# Patient Record
Sex: FEMALE | Race: WHITE | NOT HISPANIC OR LATINO | Employment: OTHER | URBAN - METROPOLITAN AREA
[De-identification: names, ages, dates, MRNs, and addresses within clinical notes are randomized per-mention and may not be internally consistent; named-entity substitution may affect disease eponyms.]

---

## 2017-03-13 ENCOUNTER — CONVERSION ENCOUNTER (OUTPATIENT)
Dept: MAMMOGRAPHY | Facility: HOSPITAL | Age: 67
End: 2017-03-13

## 2018-02-12 ENCOUNTER — OFFICE VISIT CONVERTED (OUTPATIENT)
Dept: INTERNAL MEDICINE | Facility: CLINIC | Age: 68
End: 2018-02-12
Attending: INTERNAL MEDICINE

## 2018-03-14 ENCOUNTER — CONVERSION ENCOUNTER (OUTPATIENT)
Dept: INTERNAL MEDICINE | Facility: CLINIC | Age: 68
End: 2018-03-14

## 2018-03-14 ENCOUNTER — OFFICE VISIT CONVERTED (OUTPATIENT)
Dept: INTERNAL MEDICINE | Facility: CLINIC | Age: 68
End: 2018-03-14
Attending: INTERNAL MEDICINE

## 2018-03-20 ENCOUNTER — CONVERSION ENCOUNTER (OUTPATIENT)
Dept: MAMMOGRAPHY | Facility: HOSPITAL | Age: 68
End: 2018-03-20

## 2018-04-05 ENCOUNTER — CONVERSION ENCOUNTER (OUTPATIENT)
Dept: GASTROENTEROLOGY | Facility: CLINIC | Age: 68
End: 2018-04-05

## 2018-06-06 ENCOUNTER — CONVERSION ENCOUNTER (OUTPATIENT)
Dept: NEUROLOGY | Facility: CLINIC | Age: 68
End: 2018-06-06

## 2018-06-06 ENCOUNTER — OFFICE VISIT CONVERTED (OUTPATIENT)
Dept: NEUROLOGY | Facility: CLINIC | Age: 68
End: 2018-06-06
Attending: PSYCHIATRY & NEUROLOGY

## 2018-06-18 ENCOUNTER — CONVERSION ENCOUNTER (OUTPATIENT)
Dept: INTERNAL MEDICINE | Facility: CLINIC | Age: 68
End: 2018-06-18

## 2018-06-18 ENCOUNTER — OFFICE VISIT CONVERTED (OUTPATIENT)
Dept: INTERNAL MEDICINE | Facility: CLINIC | Age: 68
End: 2018-06-18
Attending: INTERNAL MEDICINE

## 2018-07-13 ENCOUNTER — OFFICE VISIT CONVERTED (OUTPATIENT)
Dept: INTERNAL MEDICINE | Facility: CLINIC | Age: 68
End: 2018-07-13
Attending: INTERNAL MEDICINE

## 2018-07-18 ENCOUNTER — CONVERSION ENCOUNTER (OUTPATIENT)
Dept: INTERNAL MEDICINE | Facility: CLINIC | Age: 68
End: 2018-07-18

## 2018-08-09 ENCOUNTER — OFFICE VISIT CONVERTED (OUTPATIENT)
Dept: INTERNAL MEDICINE | Facility: CLINIC | Age: 68
End: 2018-08-09
Attending: INTERNAL MEDICINE

## 2018-08-09 ENCOUNTER — CONVERSION ENCOUNTER (OUTPATIENT)
Dept: INTERNAL MEDICINE | Facility: CLINIC | Age: 68
End: 2018-08-09

## 2018-08-13 ENCOUNTER — CONVERSION ENCOUNTER (OUTPATIENT)
Dept: SURGERY | Facility: CLINIC | Age: 68
End: 2018-08-13

## 2018-08-13 ENCOUNTER — OFFICE VISIT CONVERTED (OUTPATIENT)
Dept: UROLOGY | Facility: CLINIC | Age: 68
End: 2018-08-13
Attending: UROLOGY

## 2018-09-04 ENCOUNTER — OFFICE VISIT CONVERTED (OUTPATIENT)
Dept: INTERNAL MEDICINE | Facility: CLINIC | Age: 68
End: 2018-09-04
Attending: PHYSICIAN ASSISTANT

## 2018-09-12 ENCOUNTER — CONVERSION ENCOUNTER (OUTPATIENT)
Dept: NEUROLOGY | Facility: CLINIC | Age: 68
End: 2018-09-12

## 2018-09-12 ENCOUNTER — OFFICE VISIT CONVERTED (OUTPATIENT)
Dept: INTERNAL MEDICINE | Facility: CLINIC | Age: 68
End: 2018-09-12
Attending: INTERNAL MEDICINE

## 2018-09-12 ENCOUNTER — OFFICE VISIT CONVERTED (OUTPATIENT)
Dept: NEUROLOGY | Facility: CLINIC | Age: 68
End: 2018-09-12
Attending: PSYCHIATRY & NEUROLOGY

## 2018-10-08 ENCOUNTER — OFFICE VISIT CONVERTED (OUTPATIENT)
Dept: GASTROENTEROLOGY | Facility: CLINIC | Age: 68
End: 2018-10-08
Attending: NURSE PRACTITIONER

## 2018-10-08 ENCOUNTER — CONVERSION ENCOUNTER (OUTPATIENT)
Dept: GASTROENTEROLOGY | Facility: CLINIC | Age: 68
End: 2018-10-08

## 2018-10-24 ENCOUNTER — OFFICE VISIT CONVERTED (OUTPATIENT)
Dept: INTERNAL MEDICINE | Facility: CLINIC | Age: 68
End: 2018-10-24
Attending: INTERNAL MEDICINE

## 2018-12-14 ENCOUNTER — OFFICE VISIT CONVERTED (OUTPATIENT)
Dept: INTERNAL MEDICINE | Facility: CLINIC | Age: 68
End: 2018-12-14
Attending: NURSE PRACTITIONER

## 2019-02-07 ENCOUNTER — OFFICE VISIT CONVERTED (OUTPATIENT)
Dept: NEUROLOGY | Facility: CLINIC | Age: 69
End: 2019-02-07
Attending: PHYSICIAN ASSISTANT

## 2019-03-04 ENCOUNTER — HOSPITAL ENCOUNTER (OUTPATIENT)
Dept: OTHER | Facility: HOSPITAL | Age: 69
Discharge: HOME | End: 2019-03-04
Attending: INTERNAL MEDICINE

## 2019-03-06 LAB — BACTERIA UR CULT: ABNORMAL

## 2019-03-15 ENCOUNTER — HOSPITAL ENCOUNTER (OUTPATIENT)
Dept: OTHER | Facility: HOSPITAL | Age: 69
Discharge: HOME | End: 2019-03-15
Attending: INTERNAL MEDICINE

## 2019-05-06 ENCOUNTER — OFFICE VISIT CONVERTED (OUTPATIENT)
Dept: INTERNAL MEDICINE | Facility: CLINIC | Age: 69
End: 2019-05-06
Attending: PHYSICIAN ASSISTANT

## 2019-05-06 ENCOUNTER — HOSPITAL ENCOUNTER (OUTPATIENT)
Dept: OTHER | Facility: HOSPITAL | Age: 69
Discharge: HOME | End: 2019-05-06
Attending: PHYSICIAN ASSISTANT

## 2019-06-11 ENCOUNTER — OFFICE VISIT CONVERTED (OUTPATIENT)
Dept: GASTROENTEROLOGY | Facility: CLINIC | Age: 69
End: 2019-06-11
Attending: NURSE PRACTITIONER

## 2019-07-01 ENCOUNTER — HOSPITAL ENCOUNTER (OUTPATIENT)
Dept: OTHER | Facility: HOSPITAL | Age: 69
Discharge: HOME | End: 2019-07-01
Attending: PHYSICIAN ASSISTANT

## 2019-07-01 ENCOUNTER — OFFICE VISIT CONVERTED (OUTPATIENT)
Dept: INTERNAL MEDICINE | Facility: CLINIC | Age: 69
End: 2019-07-01
Attending: PHYSICIAN ASSISTANT

## 2019-07-01 ENCOUNTER — CONVERSION ENCOUNTER (OUTPATIENT)
Dept: INTERNAL MEDICINE | Facility: CLINIC | Age: 69
End: 2019-07-01

## 2019-07-01 LAB
ALBUMIN SERPL-MCNC: 4.4 G/DL (ref 3.5–5)
ALBUMIN/GLOB SERPL: 1.4 {RATIO} (ref 1.4–2.6)
ALP SERPL-CCNC: 96 U/L (ref 43–160)
ALT SERPL-CCNC: 58 U/L (ref 10–40)
ANION GAP SERPL CALC-SCNC: 18 MMOL/L (ref 8–19)
AST SERPL-CCNC: 51 U/L (ref 15–50)
BASOPHILS # BLD AUTO: 0.02 10*3/UL (ref 0–0.2)
BASOPHILS NFR BLD AUTO: 0.4 % (ref 0–3)
BILIRUB SERPL-MCNC: 0.71 MG/DL (ref 0.2–1.3)
BUN SERPL-MCNC: 11 MG/DL (ref 5–25)
BUN/CREAT SERPL: 14 {RATIO} (ref 6–20)
CALCIUM SERPL-MCNC: 9.8 MG/DL (ref 8.7–10.4)
CHLORIDE SERPL-SCNC: 100 MMOL/L (ref 99–111)
CHOLEST SERPL-MCNC: 231 MG/DL (ref 107–200)
CHOLEST/HDLC SERPL: 4.9 {RATIO} (ref 3–6)
CONV ABS IMM GRAN: 0.02 10*3/UL (ref 0–0.2)
CONV CO2: 22 MMOL/L (ref 22–32)
CONV IMMATURE GRAN: 0.4 % (ref 0–1.8)
CONV TOTAL PROTEIN: 7.6 G/DL (ref 6.3–8.2)
CREAT UR-MCNC: 0.76 MG/DL (ref 0.5–0.9)
DEPRECATED RDW RBC AUTO: 44.4 FL (ref 36.4–46.3)
EOSINOPHIL # BLD AUTO: 0.03 10*3/UL (ref 0–0.7)
EOSINOPHIL # BLD AUTO: 0.7 % (ref 0–7)
ERYTHROCYTE [DISTWIDTH] IN BLOOD BY AUTOMATED COUNT: 13.7 % (ref 11.7–14.4)
EST. AVERAGE GLUCOSE BLD GHB EST-MCNC: 255 MG/DL
GFR SERPLBLD BASED ON 1.73 SQ M-ARVRAT: >60 ML/MIN/{1.73_M2}
GLOBULIN UR ELPH-MCNC: 3.2 G/DL (ref 2–3.5)
GLUCOSE SERPL-MCNC: 264 MG/DL (ref 65–99)
HBA1C MFR BLD: 10.5 % (ref 3.5–5.7)
HBA1C MFR BLD: 12.8 G/DL (ref 12–16)
HCT VFR BLD AUTO: 39 % (ref 37–47)
HDLC SERPL-MCNC: 47 MG/DL (ref 40–60)
LDLC SERPL CALC-MCNC: 157 MG/DL (ref 70–100)
LYMPHOCYTES # BLD AUTO: 1.47 10*3/UL (ref 1–5)
MCH RBC QN AUTO: 29.8 PG (ref 27–31)
MCHC RBC AUTO-ENTMCNC: 32.8 G/DL (ref 33–37)
MCV RBC AUTO: 90.7 FL (ref 81–99)
MONOCYTES # BLD AUTO: 0.31 10*3/UL (ref 0.2–1.2)
MONOCYTES NFR BLD AUTO: 6.8 % (ref 3–10)
NEUTROPHILS # BLD AUTO: 2.73 10*3/UL (ref 2–8)
NEUTROPHILS NFR BLD AUTO: 59.6 % (ref 30–85)
NRBC CBCN: 0 % (ref 0–0.7)
OSMOLALITY SERPL CALC.SUM OF ELEC: 291 MOSM/KG (ref 273–304)
PLATELET # BLD AUTO: 308 10*3/UL (ref 130–400)
PMV BLD AUTO: 9.5 FL (ref 9.4–12.3)
POTASSIUM SERPL-SCNC: 4.2 MMOL/L (ref 3.5–5.3)
RBC # BLD AUTO: 4.3 10*6/UL (ref 4.2–5.4)
SODIUM SERPL-SCNC: 136 MMOL/L (ref 135–147)
T4 FREE SERPL-MCNC: 1.2 NG/DL (ref 0.9–1.8)
TRIGL SERPL-MCNC: 137 MG/DL (ref 40–150)
TSH SERPL-ACNC: 0.71 M[IU]/L (ref 0.27–4.2)
VARIANT LYMPHS NFR BLD MANUAL: 32.1 % (ref 20–45)
VLDLC SERPL-MCNC: 27 MG/DL (ref 5–37)
WBC # BLD AUTO: 4.58 10*3/UL (ref 4.8–10.8)

## 2019-07-31 ENCOUNTER — OFFICE VISIT CONVERTED (OUTPATIENT)
Dept: INTERNAL MEDICINE | Facility: CLINIC | Age: 69
End: 2019-07-31
Attending: PHYSICIAN ASSISTANT

## 2019-07-31 ENCOUNTER — CONVERSION ENCOUNTER (OUTPATIENT)
Dept: INTERNAL MEDICINE | Facility: CLINIC | Age: 69
End: 2019-07-31

## 2019-09-20 ENCOUNTER — HOSPITAL ENCOUNTER (OUTPATIENT)
Dept: OTHER | Facility: HOSPITAL | Age: 69
Discharge: HOME | End: 2019-09-20
Attending: INTERNAL MEDICINE

## 2019-09-20 ENCOUNTER — OFFICE VISIT CONVERTED (OUTPATIENT)
Dept: INTERNAL MEDICINE | Facility: CLINIC | Age: 69
End: 2019-09-20
Attending: INTERNAL MEDICINE

## 2019-09-20 LAB
ALBUMIN SERPL-MCNC: 4.5 G/DL (ref 3.5–5)
ALBUMIN/GLOB SERPL: 1.3 {RATIO} (ref 1.4–2.6)
ALP SERPL-CCNC: 100 U/L (ref 43–160)
ALT SERPL-CCNC: 39 U/L (ref 10–40)
ANION GAP SERPL CALC-SCNC: 20 MMOL/L (ref 8–19)
AST SERPL-CCNC: 33 U/L (ref 15–50)
BASOPHILS # BLD AUTO: 0.03 10*3/UL (ref 0–0.2)
BASOPHILS NFR BLD AUTO: 0.6 % (ref 0–3)
BILIRUB SERPL-MCNC: 0.34 MG/DL (ref 0.2–1.3)
BUN SERPL-MCNC: 15 MG/DL (ref 5–25)
BUN/CREAT SERPL: 16 {RATIO} (ref 6–20)
CALCIUM SERPL-MCNC: 9.6 MG/DL (ref 8.7–10.4)
CHLORIDE SERPL-SCNC: 102 MMOL/L (ref 99–111)
CHOLEST SERPL-MCNC: 190 MG/DL (ref 107–200)
CHOLEST/HDLC SERPL: 4.6 {RATIO} (ref 3–6)
CONV ABS IMM GRAN: 0.03 10*3/UL (ref 0–0.2)
CONV CO2: 23 MMOL/L (ref 22–32)
CONV IMMATURE GRAN: 0.6 % (ref 0–1.8)
CONV TOTAL PROTEIN: 7.9 G/DL (ref 6.3–8.2)
CREAT UR-MCNC: 0.96 MG/DL (ref 0.5–0.9)
DEPRECATED RDW RBC AUTO: 44 FL (ref 36.4–46.3)
EOSINOPHIL # BLD AUTO: 0.04 10*3/UL (ref 0–0.7)
EOSINOPHIL # BLD AUTO: 0.8 % (ref 0–7)
ERYTHROCYTE [DISTWIDTH] IN BLOOD BY AUTOMATED COUNT: 13.4 % (ref 11.7–14.4)
EST. AVERAGE GLUCOSE BLD GHB EST-MCNC: 232 MG/DL
GFR SERPLBLD BASED ON 1.73 SQ M-ARVRAT: >60 ML/MIN/{1.73_M2}
GLOBULIN UR ELPH-MCNC: 3.4 G/DL (ref 2–3.5)
GLUCOSE SERPL-MCNC: 256 MG/DL (ref 65–99)
HBA1C MFR BLD: 9.7 % (ref 3.5–5.7)
HCT VFR BLD AUTO: 39.1 % (ref 37–47)
HDLC SERPL-MCNC: 41 MG/DL (ref 40–60)
HGB BLD-MCNC: 12.7 G/DL (ref 12–16)
LDLC SERPL CALC-MCNC: 111 MG/DL (ref 70–100)
LYMPHOCYTES # BLD AUTO: 1.52 10*3/UL (ref 1–5)
LYMPHOCYTES NFR BLD AUTO: 29.9 % (ref 20–45)
MCH RBC QN AUTO: 29.5 PG (ref 27–31)
MCHC RBC AUTO-ENTMCNC: 32.5 G/DL (ref 33–37)
MCV RBC AUTO: 90.9 FL (ref 81–99)
MONOCYTES # BLD AUTO: 0.31 10*3/UL (ref 0.2–1.2)
MONOCYTES NFR BLD AUTO: 6.1 % (ref 3–10)
NEUTROPHILS # BLD AUTO: 3.15 10*3/UL (ref 2–8)
NEUTROPHILS NFR BLD AUTO: 62 % (ref 30–85)
NRBC CBCN: 0 % (ref 0–0.7)
OSMOLALITY SERPL CALC.SUM OF ELEC: 302 MOSM/KG (ref 273–304)
PLATELET # BLD AUTO: 309 10*3/UL (ref 130–400)
PMV BLD AUTO: 9.5 FL (ref 9.4–12.3)
POTASSIUM SERPL-SCNC: 4.2 MMOL/L (ref 3.5–5.3)
RBC # BLD AUTO: 4.3 10*6/UL (ref 4.2–5.4)
SODIUM SERPL-SCNC: 141 MMOL/L (ref 135–147)
T4 FREE SERPL-MCNC: 1.1 NG/DL (ref 0.9–1.8)
TRIGL SERPL-MCNC: 189 MG/DL (ref 40–150)
TSH SERPL-ACNC: 0.83 M[IU]/L (ref 0.27–4.2)
VLDLC SERPL-MCNC: 38 MG/DL (ref 5–37)
WBC # BLD AUTO: 5.08 10*3/UL (ref 4.8–10.8)

## 2019-09-21 LAB — 25(OH)D3 SERPL-MCNC: 61 NG/ML (ref 30–100)

## 2019-10-03 ENCOUNTER — OFFICE VISIT CONVERTED (OUTPATIENT)
Dept: NEUROLOGY | Facility: CLINIC | Age: 69
End: 2019-10-03
Attending: PSYCHIATRY & NEUROLOGY

## 2019-10-03 ENCOUNTER — HOSPITAL ENCOUNTER (OUTPATIENT)
Dept: ULTRASOUND IMAGING | Facility: HOSPITAL | Age: 69
Discharge: HOME | End: 2019-10-03
Attending: INTERNAL MEDICINE

## 2019-10-31 ENCOUNTER — HOSPITAL ENCOUNTER (OUTPATIENT)
Dept: SURGERY | Facility: CLINIC | Age: 69
Discharge: HOME | End: 2019-10-31
Attending: INTERNAL MEDICINE

## 2019-11-13 ENCOUNTER — OFFICE VISIT CONVERTED (OUTPATIENT)
Dept: INTERNAL MEDICINE | Facility: CLINIC | Age: 69
End: 2019-11-13
Attending: PHYSICIAN ASSISTANT

## 2019-11-13 ENCOUNTER — CONVERSION ENCOUNTER (OUTPATIENT)
Dept: INTERNAL MEDICINE | Facility: CLINIC | Age: 69
End: 2019-11-13

## 2019-11-19 ENCOUNTER — HOSPITAL ENCOUNTER (OUTPATIENT)
Dept: OTHER | Facility: HOSPITAL | Age: 69
Discharge: HOME | End: 2019-11-19
Attending: NURSE PRACTITIONER

## 2019-11-19 ENCOUNTER — CONVERSION ENCOUNTER (OUTPATIENT)
Dept: INTERNAL MEDICINE | Facility: CLINIC | Age: 69
End: 2019-11-19

## 2019-11-19 ENCOUNTER — OFFICE VISIT CONVERTED (OUTPATIENT)
Dept: INTERNAL MEDICINE | Facility: CLINIC | Age: 69
End: 2019-11-19
Attending: NURSE PRACTITIONER

## 2019-12-06 ENCOUNTER — HOSPITAL ENCOUNTER (OUTPATIENT)
Dept: URGENT CARE | Facility: CLINIC | Age: 69
Discharge: HOME | End: 2019-12-06
Attending: NURSE PRACTITIONER

## 2020-01-06 ENCOUNTER — HOSPITAL ENCOUNTER (OUTPATIENT)
Dept: MAMMOGRAPHY | Facility: HOSPITAL | Age: 70
Discharge: HOME | End: 2020-01-06
Attending: INTERNAL MEDICINE

## 2020-02-05 ENCOUNTER — CONVERSION ENCOUNTER (OUTPATIENT)
Dept: SURGERY | Facility: CLINIC | Age: 70
End: 2020-02-05

## 2020-02-05 ENCOUNTER — OFFICE VISIT CONVERTED (OUTPATIENT)
Dept: UROLOGY | Facility: CLINIC | Age: 70
End: 2020-02-05
Attending: UROLOGY

## 2020-02-14 ENCOUNTER — OFFICE VISIT CONVERTED (OUTPATIENT)
Dept: INTERNAL MEDICINE | Facility: CLINIC | Age: 70
End: 2020-02-14
Attending: INTERNAL MEDICINE

## 2020-02-14 ENCOUNTER — HOSPITAL ENCOUNTER (OUTPATIENT)
Dept: OTHER | Facility: HOSPITAL | Age: 70
Discharge: HOME | End: 2020-02-14
Attending: INTERNAL MEDICINE

## 2020-02-14 ENCOUNTER — CONVERSION ENCOUNTER (OUTPATIENT)
Dept: INTERNAL MEDICINE | Facility: CLINIC | Age: 70
End: 2020-02-14

## 2020-02-14 LAB
ALBUMIN SERPL-MCNC: 4.1 G/DL (ref 3.5–5)
ALBUMIN/GLOB SERPL: 1.2 {RATIO} (ref 1.4–2.6)
ALP SERPL-CCNC: 141 U/L (ref 43–160)
ALT SERPL-CCNC: 59 U/L (ref 10–40)
ANION GAP SERPL CALC-SCNC: 17 MMOL/L (ref 8–19)
AST SERPL-CCNC: 43 U/L (ref 15–50)
BASOPHILS # BLD AUTO: 0.03 10*3/UL (ref 0–0.2)
BASOPHILS NFR BLD AUTO: 0.7 % (ref 0–3)
BILIRUB SERPL-MCNC: 0.34 MG/DL (ref 0.2–1.3)
BUN SERPL-MCNC: 10 MG/DL (ref 5–25)
BUN/CREAT SERPL: 14 {RATIO} (ref 6–20)
CALCIUM SERPL-MCNC: 9.5 MG/DL (ref 8.7–10.4)
CHLORIDE SERPL-SCNC: 99 MMOL/L (ref 99–111)
CHOLEST SERPL-MCNC: 211 MG/DL (ref 107–200)
CHOLEST/HDLC SERPL: 4.9 {RATIO} (ref 3–6)
CONV ABS IMM GRAN: 0.03 10*3/UL (ref 0–0.2)
CONV CO2: 24 MMOL/L (ref 22–32)
CONV IMMATURE GRAN: 0.7 % (ref 0–1.8)
CONV TOTAL PROTEIN: 7.6 G/DL (ref 6.3–8.2)
CREAT UR-MCNC: 0.73 MG/DL (ref 0.5–0.9)
DEPRECATED RDW RBC AUTO: 43.3 FL (ref 36.4–46.3)
EOSINOPHIL # BLD AUTO: 0.04 10*3/UL (ref 0–0.7)
EOSINOPHIL # BLD AUTO: 0.9 % (ref 0–7)
ERYTHROCYTE [DISTWIDTH] IN BLOOD BY AUTOMATED COUNT: 13.2 % (ref 11.7–14.4)
EST. AVERAGE GLUCOSE BLD GHB EST-MCNC: 260 MG/DL
GFR SERPLBLD BASED ON 1.73 SQ M-ARVRAT: >60 ML/MIN/{1.73_M2}
GLOBULIN UR ELPH-MCNC: 3.5 G/DL (ref 2–3.5)
GLUCOSE SERPL-MCNC: 364 MG/DL (ref 65–99)
HBA1C MFR BLD: 10.7 % (ref 3.5–5.7)
HCT VFR BLD AUTO: 40.4 % (ref 37–47)
HDLC SERPL-MCNC: 43 MG/DL (ref 40–60)
HGB BLD-MCNC: 13.4 G/DL (ref 12–16)
LDLC SERPL CALC-MCNC: 99 MG/DL (ref 70–100)
LYMPHOCYTES # BLD AUTO: 1.34 10*3/UL (ref 1–5)
LYMPHOCYTES NFR BLD AUTO: 30 % (ref 20–45)
MCH RBC QN AUTO: 29.8 PG (ref 27–31)
MCHC RBC AUTO-ENTMCNC: 33.2 G/DL (ref 33–37)
MCV RBC AUTO: 89.8 FL (ref 81–99)
MONOCYTES # BLD AUTO: 0.27 10*3/UL (ref 0.2–1.2)
MONOCYTES NFR BLD AUTO: 6 % (ref 3–10)
NEUTROPHILS # BLD AUTO: 2.76 10*3/UL (ref 2–8)
NEUTROPHILS NFR BLD AUTO: 61.7 % (ref 30–85)
NRBC CBCN: 0 % (ref 0–0.7)
OSMOLALITY SERPL CALC.SUM OF ELEC: 296 MOSM/KG (ref 273–304)
PLATELET # BLD AUTO: 305 10*3/UL (ref 130–400)
PMV BLD AUTO: 10 FL (ref 9.4–12.3)
POTASSIUM SERPL-SCNC: 4.2 MMOL/L (ref 3.5–5.3)
RBC # BLD AUTO: 4.5 10*6/UL (ref 4.2–5.4)
SODIUM SERPL-SCNC: 136 MMOL/L (ref 135–147)
T4 FREE SERPL-MCNC: 1 NG/DL (ref 0.9–1.8)
TRIGL SERPL-MCNC: 343 MG/DL (ref 40–150)
TSH SERPL-ACNC: 1.31 M[IU]/L (ref 0.27–4.2)
VLDLC SERPL-MCNC: 69 MG/DL (ref 5–37)
WBC # BLD AUTO: 4.47 10*3/UL (ref 4.8–10.8)

## 2020-02-20 ENCOUNTER — OFFICE VISIT CONVERTED (OUTPATIENT)
Dept: GASTROENTEROLOGY | Facility: CLINIC | Age: 70
End: 2020-02-20
Attending: NURSE PRACTITIONER

## 2020-04-23 ENCOUNTER — HOSPITAL ENCOUNTER (OUTPATIENT)
Dept: LAB | Facility: HOSPITAL | Age: 70
Discharge: HOME | End: 2020-04-23
Attending: NURSE PRACTITIONER

## 2020-04-23 LAB
CONV AFP: 1.1 NG/ML (ref 0–8.7)
DSDNA AB SER-ACNC: NEGATIVE [IU]/ML
ENA AB SER IA-ACNC: NEGATIVE {RATIO}
FERRITIN SERPL-MCNC: 201 NG/ML (ref 10–200)
INR PPP: 0.92 (ref 2–3)
IRON SATN MFR SERPL: 15 % (ref 20–55)
IRON SERPL-MCNC: 58 UG/DL (ref 60–170)
PROTHROMBIN TIME: 10.1 S (ref 9.4–12)
TIBC SERPL-MCNC: 399 UG/DL (ref 245–450)
TRANSFERRIN SERPL-MCNC: 279 MG/DL (ref 250–380)

## 2020-04-25 LAB
CERULOPLASMIN SERPL-MCNC: 28.2 MG/DL (ref 19–39)
CONV HEPATITIS B SURFACE AG W CONFIRMATION RE: NEGATIVE
COPPER SERPL-MCNC: 124 UG/DL (ref 72–166)
DEPRECATED MITOCHONDRIA M2 IGG SER-ACNC: <20 UNITS (ref 0–20)
HAV IGM SERPL QL IA: NEGATIVE
HBV CORE IGM SERPL QL IA: NEGATIVE
HCV AB SER DONR QL: <0.1 S/CO RATIO (ref 0–0.9)
SMOOTH MUSCLE F-ACTIN AB IGG: 4 UNITS (ref 0–19)

## 2020-04-27 ENCOUNTER — TELEMEDICINE CONVERTED (OUTPATIENT)
Dept: GASTROENTEROLOGY | Facility: CLINIC | Age: 70
End: 2020-04-27
Attending: NURSE PRACTITIONER

## 2020-04-27 LAB
ALBUMIN SERPL-MCNC: 3.5 G/DL (ref 2.9–4.4)
ALBUMIN/GLOB SERPL: 1 {RATIO} (ref 0.7–1.7)
ALPHA2 GLOB SERPL ELPH-MCNC: 1 G/DL (ref 0.4–1)
BETA GLOBULIN: 1.2 G/DL (ref 0.7–1.3)
CONV ALPHA-1-GLOBULIN: 0.3 G/DL (ref 0–0.4)
CONV IMMUNOGLOBULIN G (IGG): 1047 MG/DL (ref 586–1602)
CONV IMMUNOGLOBULIN M (IGM): 74 MG/DL (ref 26–217)
CONV PE INTERPRETATION: NORMAL
CONV PE NOTE: NORMAL
CONV TOTAL PROTEIN: 7.1 G/DL (ref 6–8.5)
GAMMA GLOB SERPL ELPH-MCNC: 1.1 G/DL (ref 0.4–1.8)
GLOBULIN UR ELPH-MCNC: 3.6 G/DL (ref 2.2–3.9)
IGA SERPL-MCNC: 164 MG/DL (ref 87–352)
M-SPIKE: NORMAL G/DL
PROT PATTERN SERPL IFE-IMP: NORMAL

## 2020-04-28 LAB
A1AT SERPL-MCNC: 145 MG/DL (ref 101–187)
CONV NASH FIBROSURE: NORMAL
PHENOTYPE: NORMAL

## 2020-05-20 ENCOUNTER — TELEMEDICINE CONVERTED (OUTPATIENT)
Dept: NEUROLOGY | Facility: CLINIC | Age: 70
End: 2020-05-20
Attending: PSYCHIATRY & NEUROLOGY

## 2020-06-17 ENCOUNTER — OFFICE VISIT CONVERTED (OUTPATIENT)
Dept: INTERNAL MEDICINE | Facility: CLINIC | Age: 70
End: 2020-06-17
Attending: INTERNAL MEDICINE

## 2020-07-28 ENCOUNTER — OFFICE VISIT CONVERTED (OUTPATIENT)
Dept: INTERNAL MEDICINE | Facility: CLINIC | Age: 70
End: 2020-07-28
Attending: NURSE PRACTITIONER

## 2020-07-28 ENCOUNTER — HOSPITAL ENCOUNTER (OUTPATIENT)
Dept: OTHER | Facility: HOSPITAL | Age: 70
Discharge: HOME | End: 2020-07-28
Attending: NURSE PRACTITIONER

## 2020-10-06 ENCOUNTER — HOSPITAL ENCOUNTER (OUTPATIENT)
Dept: OTHER | Facility: HOSPITAL | Age: 70
Discharge: HOME | End: 2020-10-06
Attending: NURSE PRACTITIONER

## 2020-10-06 ENCOUNTER — CONVERSION ENCOUNTER (OUTPATIENT)
Dept: INTERNAL MEDICINE | Facility: CLINIC | Age: 70
End: 2020-10-06

## 2020-10-06 ENCOUNTER — OFFICE VISIT CONVERTED (OUTPATIENT)
Dept: INTERNAL MEDICINE | Facility: CLINIC | Age: 70
End: 2020-10-06
Attending: NURSE PRACTITIONER

## 2020-10-06 LAB
BASOPHILS # BLD AUTO: 0.02 10*3/UL (ref 0–0.2)
BASOPHILS NFR BLD AUTO: 0.4 % (ref 0–3)
CONV ABS IMM GRAN: 0.03 10*3/UL (ref 0–0.2)
CONV CREATININE URINE, RANDOM: 44.3 MG/DL (ref 10–300)
CONV IMMATURE GRAN: 0.6 % (ref 0–1.8)
CONV MICROALBUM.,U,RANDOM: <12 MG/L (ref 0–20)
DEPRECATED RDW RBC AUTO: 43.6 FL (ref 36.4–46.3)
EOSINOPHIL # BLD AUTO: 0.04 10*3/UL (ref 0–0.7)
EOSINOPHIL # BLD AUTO: 0.8 % (ref 0–7)
ERYTHROCYTE [DISTWIDTH] IN BLOOD BY AUTOMATED COUNT: 13.2 % (ref 11.7–14.4)
HCT VFR BLD AUTO: 40.5 % (ref 37–47)
HGB BLD-MCNC: 13.2 G/DL (ref 12–16)
LYMPHOCYTES # BLD AUTO: 1.57 10*3/UL (ref 1–5)
LYMPHOCYTES NFR BLD AUTO: 32.8 % (ref 20–45)
MCH RBC QN AUTO: 29.9 PG (ref 27–31)
MCHC RBC AUTO-ENTMCNC: 32.6 G/DL (ref 33–37)
MCV RBC AUTO: 91.6 FL (ref 81–99)
MICROALBUMIN/CREAT UR: 27.1 MG/G{CRE} (ref 0–35)
MONOCYTES # BLD AUTO: 0.25 10*3/UL (ref 0.2–1.2)
MONOCYTES NFR BLD AUTO: 5.2 % (ref 3–10)
NEUTROPHILS # BLD AUTO: 2.87 10*3/UL (ref 2–8)
NEUTROPHILS NFR BLD AUTO: 60.2 % (ref 30–85)
NRBC CBCN: 0 % (ref 0–0.7)
PLATELET # BLD AUTO: 325 10*3/UL (ref 130–400)
PMV BLD AUTO: 9.7 FL (ref 9.4–12.3)
RBC # BLD AUTO: 4.42 10*6/UL (ref 4.2–5.4)
WBC # BLD AUTO: 4.78 10*3/UL (ref 4.8–10.8)

## 2020-10-07 LAB
ALBUMIN SERPL-MCNC: 4.1 G/DL (ref 3.5–5)
ALBUMIN/GLOB SERPL: 1.3 {RATIO} (ref 1.4–2.6)
ALP SERPL-CCNC: 81 U/L (ref 43–160)
ALT SERPL-CCNC: 47 U/L (ref 10–40)
ANION GAP SERPL CALC-SCNC: 16 MMOL/L (ref 8–19)
AST SERPL-CCNC: 35 U/L (ref 15–50)
BILIRUB SERPL-MCNC: 0.36 MG/DL (ref 0.2–1.3)
BUN SERPL-MCNC: 9 MG/DL (ref 5–25)
BUN/CREAT SERPL: 11 {RATIO} (ref 6–20)
CALCIUM SERPL-MCNC: 9.4 MG/DL (ref 8.7–10.4)
CHLORIDE SERPL-SCNC: 106 MMOL/L (ref 99–111)
CHOLEST SERPL-MCNC: 214 MG/DL (ref 107–200)
CHOLEST/HDLC SERPL: 5.2 {RATIO} (ref 3–6)
CONV CO2: 23 MMOL/L (ref 22–32)
CONV TOTAL PROTEIN: 7.3 G/DL (ref 6.3–8.2)
CREAT UR-MCNC: 0.83 MG/DL (ref 0.5–0.9)
EST. AVERAGE GLUCOSE BLD GHB EST-MCNC: 183 MG/DL
GFR SERPLBLD BASED ON 1.73 SQ M-ARVRAT: >60 ML/MIN/{1.73_M2}
GLOBULIN UR ELPH-MCNC: 3.2 G/DL (ref 2–3.5)
GLUCOSE SERPL-MCNC: 196 MG/DL (ref 65–99)
HBA1C MFR BLD: 8 % (ref 3.5–5.7)
HDLC SERPL-MCNC: 41 MG/DL (ref 40–60)
LDLC SERPL CALC-MCNC: 143 MG/DL (ref 70–100)
OSMOLALITY SERPL CALC.SUM OF ELEC: 296 MOSM/KG (ref 273–304)
POTASSIUM SERPL-SCNC: 3.8 MMOL/L (ref 3.5–5.3)
SODIUM SERPL-SCNC: 141 MMOL/L (ref 135–147)
T4 FREE SERPL-MCNC: 0.9 NG/DL (ref 0.9–1.8)
TRIGL SERPL-MCNC: 150 MG/DL (ref 40–150)
TSH SERPL-ACNC: 0.73 M[IU]/L (ref 0.27–4.2)
VLDLC SERPL-MCNC: 30 MG/DL (ref 5–37)

## 2020-10-16 ENCOUNTER — OFFICE VISIT CONVERTED (OUTPATIENT)
Dept: INTERNAL MEDICINE | Facility: CLINIC | Age: 70
End: 2020-10-16
Attending: INTERNAL MEDICINE

## 2020-11-19 ENCOUNTER — OFFICE VISIT CONVERTED (OUTPATIENT)
Dept: INTERNAL MEDICINE | Facility: CLINIC | Age: 70
End: 2020-11-19
Attending: STUDENT IN AN ORGANIZED HEALTH CARE EDUCATION/TRAINING PROGRAM

## 2020-11-23 ENCOUNTER — OFFICE VISIT CONVERTED (OUTPATIENT)
Dept: GASTROENTEROLOGY | Facility: CLINIC | Age: 70
End: 2020-11-23
Attending: NURSE PRACTITIONER

## 2020-12-02 ENCOUNTER — HOSPITAL ENCOUNTER (OUTPATIENT)
Dept: OTHER | Facility: HOSPITAL | Age: 70
Discharge: HOME | End: 2020-12-02
Attending: NURSE PRACTITIONER

## 2020-12-02 LAB
C DIFF TOX B STL QL CT TISS CULT: NEGATIVE
CONV 027 TOXIN: NEGATIVE

## 2020-12-03 LAB
CONV NEUTRAL FATS: NORMAL
FAT STL QL: NORMAL

## 2020-12-04 LAB — BACTERIA SPEC AEROBE CULT: NORMAL

## 2020-12-10 ENCOUNTER — HOSPITAL ENCOUNTER (OUTPATIENT)
Dept: OTHER | Facility: HOSPITAL | Age: 70
Discharge: HOME | End: 2020-12-10
Attending: PHYSICIAN ASSISTANT

## 2020-12-17 ENCOUNTER — HOSPITAL ENCOUNTER (OUTPATIENT)
Dept: PREADMISSION TESTING | Facility: HOSPITAL | Age: 70
Discharge: HOME | End: 2020-12-17
Attending: INTERNAL MEDICINE

## 2020-12-18 LAB — SARS-COV-2 RNA SPEC QL NAA+PROBE: NOT DETECTED

## 2020-12-22 ENCOUNTER — HOSPITAL ENCOUNTER (OUTPATIENT)
Dept: GASTROENTEROLOGY | Facility: HOSPITAL | Age: 70
Setting detail: HOSPITAL OUTPATIENT SURGERY
Discharge: HOME | End: 2020-12-22
Attending: INTERNAL MEDICINE

## 2020-12-22 LAB — GLUCOSE BLD-MCNC: 133 MG/DL (ref 65–99)

## 2021-01-16 ENCOUNTER — HOSPITAL ENCOUNTER (OUTPATIENT)
Dept: VACCINE CLINIC | Facility: HOSPITAL | Age: 71
Discharge: HOME | End: 2021-01-16
Attending: INTERNAL MEDICINE

## 2021-02-09 ENCOUNTER — OFFICE VISIT CONVERTED (OUTPATIENT)
Dept: GASTROENTEROLOGY | Facility: CLINIC | Age: 71
End: 2021-02-09
Attending: NURSE PRACTITIONER

## 2021-02-09 ENCOUNTER — HOSPITAL ENCOUNTER (OUTPATIENT)
Dept: VACCINE CLINIC | Facility: HOSPITAL | Age: 71
Discharge: HOME | End: 2021-02-09
Attending: INTERNAL MEDICINE

## 2021-03-01 ENCOUNTER — OFFICE VISIT CONVERTED (OUTPATIENT)
Dept: INTERNAL MEDICINE | Facility: CLINIC | Age: 71
End: 2021-03-01
Attending: PHYSICIAN ASSISTANT

## 2021-05-03 ENCOUNTER — HOSPITAL ENCOUNTER (OUTPATIENT)
Dept: CT IMAGING | Facility: HOSPITAL | Age: 71
Discharge: HOME | End: 2021-05-03
Attending: OPHTHALMOLOGY

## 2021-05-03 LAB
CREAT BLD-MCNC: 0.8 MG/DL (ref 0.6–1.4)
GFR SERPLBLD BASED ON 1.73 SQ M-ARVRAT: >60 ML/MIN/{1.73_M2}

## 2021-05-12 NOTE — PROGRESS NOTES
Progress Note      Patient Name: Sowmya Hodges   Patient ID: [id number]   Sex: Female   YOB: 1950    Primary Care Provider: Romi Rubio MD   Referring Provider: Chloe VENTURA    Visit Date: April 27, 2020    Provider: LORENZO Powell   Location: [facility]   Location Address: [address] [place] [address]  [id number]   Location Phone: [phone]          Chief Complaint     PT states her stomach hurts her every know and then her stomach stays hard, she states its stays bloated, sometimes she east she gets sick to her stomach. no other issue at this time.       History Of Present Illness  Sowmya Hodges is a 69 year old /White female who presents to the office today.   Video Conferencing Visit  Sowmya Hodges is a 69 year old /White female who is presenting for evaluation via video conferencing. Verbal consent obtained before beginning visit.   The following staff were present during this visit: KI Dickerson; Bassam Macias MA      Due to the national emergency of COVID-19, all visits are being performed via telehealth where possible.    Patient initially presented in 2017 with abdominal pain and heartburn.  Gastric emptying scan December 2017 within normal limits.  EGD/colonoscopy 4/12/2017: Erosive gastritis--stomach biopsy with mild reactive gastropathy otherwise negative, duodenal biopsy negative; good prep and negative--in recall for 10 yrs        PMH significant for renal cell carcinoma 2014 status post partial right nephrectomy, follows w DR Butts.    Patient was last seen February 2020 complaining of abdominal bloating and discomfort, especially after a meal, was on Linzess 145 mcg/day had a few episodes of fecal urgency with incontinence from that so she was changed to Motegrity.  Low FODMAP diet was reviewed with patient.  Liver work-up was ordered for fatty liver that was noted on CT done by urology in February.   Patient also reported she had a cousin with cirrhosis and was told they had a genetic issue.  Patient does not drink alcohol.  Reported early satiety and nausea were improved with changing from Protonix to Nexium over-the-counter.    Pt states she never received the Motegrity. Pt still w abd pain, states upper abd pain and lower abd pain--usually after a meal, not daily. Taking Linzess 145 mcg/day and has BM daily or QOD, states not as much issue w urgency.       4/23/2020 DONATO negative, ferritin 201 (unremarkable) part of liver w/u still pending/or missing       Past Medical History  Anemia; Anxiety; Arthritis; Constipation; Depression; Diabetes; Diverticulitis; Epilepsy; GERD (gastroesophageal reflux disease); Heart Disease; HTN (hypertension); Hyperlipidemia; Limb Swelling; Lower abdominal pain; TAYLOR (obstructive sleep apnea); Renal cell adenocarcinoma, right; Right Distal Fibula Fracture; Screening for breast cancer; Seasonal allergies; Shortness of Breath         Past Surgical History  Cholecystectomy; Colonoscopy; EGD; Eye Implant; Gallbladder; heart surgery; Heart Valves; Joint Surgery; Knee replacement, right; Nephrectomy, Patial Right; Open Heart Surgery; Salivary Gland Surgery; Tonsilectomy; Tonsillectomy; Wrist         Medication List  Name Date Started Instructions   Accu-Chek Sabrina Plus test strp miscellaneous strip 09/10/2018 use as directed tid prn DX E11.9   Blood Glucose Test miscellaneous strip 07/01/2019 use as directed test tid prn DX E11.9   blood-glucose meter miscellaneous kit 07/01/2019 use as directed test tid prn DX E11.9   buspirone 15 mg oral tablet 04/06/2020 TAKE 1 TABLET BY MOUTH EVERY DAY   Farxiga 10 mg oral tablet 04/17/2020 TAKE 1 TABLET(10 MG) BY MOUTH EVERY DAY IN THE MORNING   Flonase Allergy Relief 50 mcg/actuation nasal spray,suspension 11/15/2019 spray 1 - 2 sprays (50 - 100 mcg) in each nostril by intranasal route once daily as needed   gemfibrozil 600 mg oral tablet  02/03/2020 TAKE ONE TABLET BY MOUTH TWICE A DAY, 30 MINS BEFORE MORNING AND EVENING MEALS   Januvia 100 mg oral tablet 03/23/2020 TAKE 1 TABLET(100 MG) BY MOUTH EVERY DAY   Keppra 750 mg oral tablet 04/17/2020 take 1 tablet (750 mg) by oral route 2 times per day for 90 days   Klonopin 1 mg oral tablet 03/11/2020 take 1 tablet by oral route 2 times a day as needed   Lancets,Ultra Thin 26 gauge miscellaneous misc 10/24/2018 use as directed test tid prn DX E11.9   levetiracetam 750 mg oral tablet 10/23/2019 TAKE 1 TABLET BY MOUTH TWICE DAILY   Lexapro 20 mg oral tablet 04/17/2020 take 1 tablet (20 mg) by oral route once daily for 90 days   Linzess oral  --    lisinopril 2.5 mg oral tablet 04/17/2020 TAKE 1 TABLET BY MOUTH ONCE DAILY   Motegrity 2 mg oral tablet 02/20/2020 take 1 tablet (2 mg) by oral route once daily for 30 days   multivitamin oral tablet  take 1 tablet by oral route daily   Nexium 20 mg oral capsule,delayed release(DR/EC)  --    Ozempic 0.25 mg or 0.5 mg(2 mg/1.5 mL) subcutaneous pen injector 04/10/2020 inject 0.5 mg by subcutaneous route once weekly on the same day of each week   Percocet 7.5-325 mg oral tablet  take 1 tablet by oral route every 6 hours as needed   Vitamin D3 5,000 unit oral tablet  take 1 tablet by oral route daily   Xyzal 5 mg oral tablet 08/05/2019 take 1 tablet (5 mg) by oral route once daily in the evening for 90 days         Allergy List  Codiene         Family Medical History  Prostate Cancer; Gallbladder Disorder; Family history of certain chronic disabling diseases; arthritis; No family history of colorectal cancer         Social History  Alcohol (Never); Claustophobic (Unknown); lives alone; Recreational Drug Use (Never); Retired; Single; Tobacco (Former)         Immunizations  Name Date Admin   Influenza    Influenza    Prevnar 13          Review of Systems  · Constitutional  o Admits  o : good general health lately, no acute distress  · Gastrointestinal  o Denies  o :  additional gastrointestinal symptoms except as noted in the HPI  · Psychiatric  o Admits  o : pleasant affect      Physical Examination  · Constitutional  o Appearance  o : well developed, well-nourished, in no acute distress  · Head and Face  o Head  o :   § Inspection  § : atraumatic, normocephalic  · Eyes  o Sclerae  o : sclerae white, no sclerae icterus  · Neck  o Inspection/Palpation  o : supple  · Respiratory  o Respiratory Effort  o : breathing unlabored  · Skin and Subcutaneous Tissue  o General Inspection  o : no lesions present, no rashes present  · Neurologic  o Mental Status Examination  o :   § Orientation  § : grossly oriented to person, place and time  § Speech/Language  § : communication ability within normal limits, voice quality normal, articulation of speech normal, no evidence of aphasia  § Attention  § : attention normal, concentration abilities normal  · Psychiatric  o General  o : Alert and oriented x3  o Mood and Affect  o : Mood and affect are appropriate to circumstances          Assessment  · Abdominal pain     789.00/R10.9  ? IBS  · Constipation     564.00/K59.00  · Fatty liver     571.8/K76.0  · Heartburn     787.1/R12      Plan  · Medications  o hyoscyamine sulfate 0.125 mg sublingual tablet, sublingual   SIG: place 1 tablet under tongue by translingual route every 4 to 6 hours as needed   DISP: (60) tablets with 1 refills  Prescribed on 04/27/2020     · Instructions  o Patient was educated/instructed on their diagnosis, treatment and medications prior to discharge from the clinic today.  o Patient instructed to seek medical attention urgently for new or worsening symptoms.  o Will check w lab on missing labs/hepatic w/u  o Requested MA to call [facility] to see what happened w Motegrity  o F/U 2 mo            Electronically Signed by: Chloe Euceda APRN -Author on April 27, 2020 03:32:55 PM

## 2021-05-13 NOTE — PROGRESS NOTES
Progress Note      Patient Name: Sowmya Hodges   Patient ID: [id number]   Sex: Female   YOB: 1950    Primary Care Provider: Romi Rubio MD   Referring Provider: Chloe VENTURA    Visit Date: October 16, 2020    Provider: Romi Rubio MD   Location: [facility]   Location Address: [address] [place] [address], [address]  [id number]   Location Phone: [phone]          Chief Complaint  · 1 week follow up  · DM2      History Of Present Illness  Sowmya Hodges is a 70 year old /White female who presents for evaluation and treatment of:      Sugars better controlled since increasing Ozempic dose  Still on the Farxiga  Vaginal symptoms have improved       Past Medical History  Disease Name Date Onset Notes   Anemia --  --    Anxiety --  --    Arthritis --  --    Constipation --  --    Depression --  --    Diabetes --  --    Diverticulitis --  --    Epilepsy --  --    GERD (gastroesophageal reflux disease) --  --    Heart Disease --  --    HTN (hypertension) --  --    Hyperlipidemia --  --    Limb Swelling --  --    Lower abdominal pain --  --    TAYLOR (obstructive sleep apnea) --  --    Renal cell adenocarcinoma, right 12/10/2014 --    Right Distal Fibula Fracture 09/26/2017 --    Screening for breast cancer 1/6/2020 normal   Seasonal allergies --  --    Shortness of Breath --  --          Past Surgical History  Procedure Name Date Notes   Cholecystectomy --  --    Colonoscopy 4.12.2017 Dr. Oneil   EGD 4.12.2017 Dr. Oneil   Eye Implant --  yes   Gallbladder --  --    heart surgery --  --    Heart Valves --  --    Joint Surgery --  --    Knee replacement, right --  --    Nephrectomy, Patial Right 12/2/14 Robotic Right Partial Nephrectomy w/    Open Heart Surgery --  open heart at age 4   Salivary Gland Surgery --  --    Tonsilectomy --  --    Tonsillectomy --  --    Wrist --  Left         Medication List  Name Date Started Instructions    Accu-Chek Sabrina Plus test strp miscellaneous strip 09/10/2018 use as directed tid prn DX E11.9   Blood Glucose Meter 11/09/2020 use as directed   Blood Glucose Test miscellaneous strip 11/09/2020 use as directed test tid prn DX E11.9   buspirone 15 mg oral tablet 06/05/2020 TAKE 1 TABLET BY MOUTH EVERY DAY   Diflucan 150 mg oral tablet 10/06/2020 take 1 tablet by oral route once repeat in 3 days if symptoms persist.   escitalopram oxalate 20 mg oral tablet 10/29/2020 TAKE 1 TABLET BY MOUTH EVERY DAY   Farxiga 10 mg oral tablet 08/10/2020 TAKE 1 TABLET(10 MG) BY MOUTH EVERY DAY IN THE MORNING   Flonase Allergy Relief 50 mcg/actuation nasal spray,suspension 11/15/2019 spray 1 - 2 sprays (50 - 100 mcg) in each nostril by intranasal route once daily as needed   fluticasone propionate 50 mcg/actuation nasal spray,suspension 08/18/2020 SHAKE LIQUID AND USE 1 TO 2 SPRAYS(50  MCG) IN EACH NOSTRIL EVERY DAY AS NEEDED   gemfibrozil 600 mg oral tablet 07/28/2020 TAKE ONE TABLET BY MOUTH TWICE A DAY, 30 MINS BEFORE MORNING AND EVENING MEALS   Klonopin 1 mg oral tablet 10/01/2020 take 1 tablet by oral route 2 times a day as needed for 30 days   Lancets,Ultra Thin 26 gauge miscellaneous misc 10/24/2018 use as directed test tid prn DX E11.9   levetiracetam 750 mg oral tablet 10/29/2020 TAKE 1 TABLET BY MOUTH TWICE A DAY   levocetirizine 5 mg oral tablet 07/13/2020 TAKE 1 TABLET BY MOUTH EVERY EVENING   Lexapro 20 mg oral tablet 07/08/2020 take 1 tablet (20 mg) by oral route once daily for 90 days   Linzess 145 mcg oral capsule 10/27/2020 TAKE 1 CAPSULE BY MOUTH EVERY DAY ON EMPTY STOMACH AT LEAST 30 MINUTES BEFORE 1ST MEAL OF DAY   lisinopril 2.5 mg oral tablet 04/17/2020 TAKE 1 TABLET BY MOUTH ONCE DAILY   Motegrity 2 mg oral tablet 02/20/2020 take 1 tablet (2 mg) by oral route once daily for 30 days   multivitamin oral tablet  take 1 tablet by oral route daily   Nexium 20 mg oral capsule,delayed release(DR/EC)  --     Ozempic 1 mg/dose (2 mg/1.5 mL) subcutaneous pen injector 10/08/2020 inject 1 mg by sub-q route once weekly on the same day each week, in the abd, thighs, or upper arm rotating injection sites for 90 days   Percocet 7.5-325 mg oral tablet  take 1 tablet by oral route every 6 hours as needed   Shingrix (PF) 50 mcg/0.5 mL intramuscular suspension for reconstitution 2020 inject 0.5 milliliter (50 mcg) by intramuscular route once repeat 0.5 mL dose 2 to 6 months after the first dose (total of 2 doses)   Vitamin D3 5,000 unit oral tablet  take 1 tablet by oral route daily         Allergy List  Allergen Name Date Reaction Notes   Codiene --  --  --          Family Medical History  Disease Name Relative/Age Notes   Prostate Cancer Father/70s   --    Gallbladder Disorder Mother/60   Cancer-  64   Family history of certain chronic disabling diseases; arthritis Brother/  Father/  Mother/  Sister/   Mother; Father; Sister; Brother   No family history of colorectal cancer  --          Social History  Finding Status Start/Stop Quantity Notes   Alcohol Never --/-- --  2020 - 10/03/2019 - does not drink, less than 1 drink per day, has been drinking for less than 1 year  2019 - 2018 -    Claustophobic Unknown --/-- --  yes   lives alone --  --/-- --  --    Recreational Drug Use Never --/-- --  2020 - 2019 - 2019 - 2018 - 2018 - none no   Retired --  --/-- --  --    Single --  --/-- --  --    Tobacco Former --/-- 1.5 PPD 2020 - former smoker, 1.5 packs per day, smoked 4 years  former smoker, 1.5 packs per day, smoked 4 years  former smoker         Immunizations  NameDate Admin Mfg Trade Name Lot Number Route Inj VIS Given VIS Publication   [id number]/ [facility] FLUZONE-HIGH DOSE [id number] IM LD 10/24/2018 2014   Comments: Pt tolerated well, left office in stable condition.   Prevnar [id number] [facility] PREVNAR 13 [id number] IM RD 2017  "  Comments: tolerated well         Review of Systems  · Constitutional  o Denies  o : fever, fatigue, weight loss, weight gain  · Cardiovascular  o Denies  o : lower extremity edema, claudication, chest pressure, palpitations  · Respiratory  o Denies  o : shortness of breath, wheezing, frequent cough, hemoptysis, dyspnea on exertion  · Gastrointestinal  o Denies  o : nausea, vomiting, diarrhea, constipation, abdominal pain      Vitals  Date Time BP Position Site L\R Cuff Size HR RR TEMP (F) WT  HT  BMI kg/m2 BSA m2 O2 Sat FR L/min FiO2 HC       07/28/2020 08:23 /72 Sitting    82 - R  97.6 252lbs 2oz 5'  7\" 39.49 2.33 93 %  21%    10/06/2020 01:09 /88 Sitting    79 - R  97.9 251lbs 16oz 5'  7\" 39.47 2.32 98 %  21%    10/16/2020 11:40 /66 Sitting    76 - R  96.8 253lbs 16oz 5'  7\" 39.78 2.33 95 %            Physical Examination  · Constitutional  o Appearance  o : no acute distress, well-nourished  · Head and Face  o Head  o :   § Inspection  § : atraumatic, normocephalic  · Eyes  o Eyes  o : extraocular movements intact, no scleral icterus, no conjunctival injection  · Ears, Nose, Mouth and Throat  o Ears  o :   § External Ears  § : normal  o Nose  o :   § Intranasal Exam  § : nares patent  o Oral Cavity  o :   § Oral Mucosa  § : moist mucous membranes  · Respiratory  o Respiratory Effort  o : breathing comfortably, symmetric chest rise  o Auscultation of Lungs  o : clear to asculatation bilaterally, no wheezes, rales, or rhonchii  · Cardiovascular  o Heart  o :   § Auscultation of Heart  § : regular rate and rhythm, no murmurs, rubs, or gallops  o Peripheral Vascular System  o :   § Extremities  § : no edema  · Neurologic  o Mental Status Examination  o :   § Orientation  § : grossly oriented to person, place and time  o Gait and Station  o :   § Gait Screening  § : normal gait  · Psychiatric  o General  o : normal mood and affect          Assessment  · Diabetes mellitus, type " 2     250.00/E11.9      Plan  · Orders  o ACO-39: Current medications updated and reviewed (1159F, ) - - 10/16/2020  · Medications  o Medications have been Reconciled  o Transition of Care or Provider Policy  · Instructions  o See note for indication of controlled substance. Pasquale and drug screen have been reviewed. Controlled substance agreement signed and scanned into chart. After discussion of risks and benefits of medication, I have determined patient is suitable for Rx while demonstrating the ability to safely follow and administer the medication plan. Patient understands the expectation that medication directions cannot be adjusted without a provider's written approval.   o Patient was educated/instructed on their diagnosis, treatment and medications prior to discharge from the clinic today.  · Disposition  o Keep previously scheduled follow up appointment            Electronically Signed by: Romi Rubio MD -Author on November 11, 2020 01:13:52 PM

## 2021-05-13 NOTE — PROGRESS NOTES
Progress Note      Patient Name: Sowmya Hodges   Patient ID: [id number]   Sex: Female   YOB: 1950    Primary Care Provider: Romi Rubio MD   Referring Provider: Chloe VENTURA    Visit Date: June 17, 2020    Provider: Romi Rubio MD   Location: [facility]   Location Address: [address] [place] [address], [address]  [place], [place]  [id number]   Location Phone: [phone]          Chief Complaint  · Right eye possible stye per patient      History Of Present Illness  Sowmya Hodges is a 69 year old /White female who presents for evaluation and treatment of:      Patient thinks she possibly has a stye on right eye  Started this morning  eye feels irritated  has not tried anything for symptoms       Past Medical History  Disease Name Date Onset Notes   Anemia --  --    Anxiety --  --    Arthritis --  --    Constipation --  --    Depression --  --    Diabetes --  --    Diverticulitis --  --    Epilepsy --  --    GERD (gastroesophageal reflux disease) --  --    Heart Disease --  --    HTN (hypertension) --  --    Hyperlipidemia --  --    Limb Swelling --  --    Lower abdominal pain --  --    TAYLOR (obstructive sleep apnea) --  --    Renal cell adenocarcinoma, right 12/10/2014 --    Right Distal Fibula Fracture 09/26/2017 --    Screening for breast cancer 1/6/2020 normal   Seasonal allergies --  --    Shortness of Breath --  --          Past Surgical History  Procedure Name Date Notes   Cholecystectomy --  --    Colonoscopy 4.12.2017 Dr. Oneil   EGD 4.12.2017 Dr. Oneil   Eye Implant --  yes   Gallbladder --  --    heart surgery --  --    Heart Valves --  --    Joint Surgery --  --    Knee replacement, right --  --    Nephrectomy, Patial Right 12/2/14 Robotic Right Partial Nephrectomy w/    Open Heart Surgery --  open heart at age 4   Salivary Gland Surgery --  --    Tonsilectomy --  --    Tonsillectomy --  --    Wrist --  Left         Medication List  Name  Date Started Instructions   Accu-Chek Sabrina Plus test strp miscellaneous strip 09/10/2018 use as directed tid prn DX E11.9   Blood Glucose Test miscellaneous strip 07/01/2019 use as directed test tid prn DX E11.9   blood-glucose meter miscellaneous kit 07/01/2019 use as directed test tid prn DX E11.9   buspirone 15 mg oral tablet 06/05/2020 TAKE 1 TABLET BY MOUTH EVERY DAY   Farxiga 10 mg oral tablet 04/17/2020 TAKE 1 TABLET(10 MG) BY MOUTH EVERY DAY IN THE MORNING   Flonase Allergy Relief 50 mcg/actuation nasal spray,suspension 11/15/2019 spray 1 - 2 sprays (50 - 100 mcg) in each nostril by intranasal route once daily as needed   gemfibrozil 600 mg oral tablet 02/03/2020 TAKE ONE TABLET BY MOUTH TWICE A DAY, 30 MINS BEFORE MORNING AND EVENING MEALS   Januvia 100 mg oral tablet 06/15/2020 TAKE 1 TABLET(100 MG) BY MOUTH EVERY DAY   Keppra 750 mg oral tablet 04/17/2020 take 1 tablet (750 mg) by oral route 2 times per day for 90 days   Klonopin 1 mg oral tablet 05/01/2020 take 1 tablet by oral route 2 times a day as needed   Lancets,Ultra Thin 26 gauge miscellaneous misc 10/24/2018 use as directed test tid prn DX E11.9   Lexapro 20 mg oral tablet 04/17/2020 take 1 tablet (20 mg) by oral route once daily for 90 days   Linzess oral  --    lisinopril 2.5 mg oral tablet 04/17/2020 TAKE 1 TABLET BY MOUTH ONCE DAILY   Motegrity 2 mg oral tablet 02/20/2020 take 1 tablet (2 mg) by oral route once daily for 30 days   multivitamin oral tablet  take 1 tablet by oral route daily   Nexium 20 mg oral capsule,delayed release(DR/EC)  --    Ozempic 0.25 mg or 0.5 mg(2 mg/1.5 mL) subcutaneous pen injector 05/05/2020 inject 0.5 mg by subcutaneous route once weekly on the same day of each week   Percocet 7.5-325 mg oral tablet  take 1 tablet by oral route every 6 hours as needed   promethazine 12.5 mg oral tablet 05/01/2020 take 0.5-1 tablet by oral route every 6 hours as needed   Vitamin D3 5,000 unit oral tablet  take 1 tablet by oral  route daily   Xyzal 5 mg oral tablet 2019 take 1 tablet (5 mg) by oral route once daily in the evening for 90 days         Allergy List  Allergen Name Date Reaction Notes   Codiene --  --  --          Family Medical History  Disease Name Relative/Age Notes   Prostate cancer Father/70s   --    Gallbladder Disorder Mother/60   Cancer-  64   Family history of certain chronic disabling diseases; arthritis Brother/  Father/  Mother/  Sister/   Mother; Father; Sister; Brother   No family history of colorectal cancer  --          Social History  Finding Status Start/Stop Quantity Notes   Alcohol Never --/-- --  2020 - 10/03/2019 - does not drink, less than 1 drink per day, has been drinking for less than 1 year  2019 - 2018 -    Claustophobic Unknown --/-- --  yes   lives alone --  --/-- --  --    Recreational Drug Use Never --/-- --  2020 - 2019 - 2019 - 2018 - 2018 - none no   Retired --  --/-- --  --    Single --  --/-- --  --    Tobacco Former --/-- 1.5 PPD 2020 - former smoker, 1.5 packs per day, smoked 4 years  former smoker, 1.5 packs per day, smoked 4 years  former smoker         Immunizations  NameDate Admin Mfg Trade Name Lot Number Route Inj VIS Given VIS Publication   [id number]/ [facility] FLUZONE-HIGH DOSE [id number]  LD 10/24/2018 2014   Comments: Pt tolerated well, left office in stable condition.   Infjoyngo81/21/2017 [facility] FLUZONE-HIGH DOSE [id number]  LD 2017   Comments: tolerated well   Prevnar [id number] [facility] PREVNAR 13 [id number] IM RD 2017   Comments: tolerated well         Review of Systems  · Constitutional  o Denies  o : fever, fatigue, weight loss, weight gain  · Eyes  o Admits  o : eye discomfort  · Cardiovascular  o Denies  o : lower extremity edema, claudication, chest pressure, palpitations  · Respiratory  o Denies  o : shortness of breath, wheezing, cough, hemoptysis, dyspnea on  "exertion  · Gastrointestinal  o Denies  o : nausea, vomiting, diarrhea, constipation, abdominal pain      Vitals  Date Time BP Position Site L\R Cuff Size HR RR TEMP (F) WT  HT  BMI kg/m2 BSA m2 O2 Sat HC       02/14/2020 02:08 /72 Sitting    72 - R  97.4 260lbs 4oz 5'  7\" 40.76 2.36 96 %    02/20/2020 02:24 /44 Sitting    89 - R   257lbs 0oz 5'  7\" 40.25 2.35     06/17/2020 01:53 /74 Sitting    70 - R  98.6 250lbs 4oz 5'  7\" 39.19 2.32 98 %          Physical Examination  · Constitutional  o Appearance  o : no acute distress, well-nourished  · Head and Face  o Head  o :   § Inspection  § : atraumatic, normocephalic  · Eyes  o Eyes  o : papule on right lower eyelid, no conjunctival injection  · Ears, Nose, Mouth and Throat  o Ears  o :   § External Ears  § : normal  o Nose  o :   § Intranasal Exam  § : nares patent  o Oral Cavity  o :   § Oral Mucosa  § : moist mucous membranes  · Respiratory  o Respiratory Effort  o : breathing comfortably, symmetric chest rise  o Auscultation of Lungs  o : clear to asculatation bilaterally, no wheezes, rales, or rhonchii  · Cardiovascular  o Heart  o :   § Auscultation of Heart  § : regular rate and rhythm, no murmurs, rubs, or gallops  o Peripheral Vascular System  o :   § Extremities  § : no edema  · Neurologic  o Mental Status Examination  o :   § Orientation  § : grossly oriented to person, place and time  o Gait and Station  o :   § Gait Screening  § : normal gait  · Psychiatric  o General  o : normal mood and affect          Assessment  · Stye     373.11/H00.019  recommend trial of warm compresses   if not resolving, will need to be seen by eye doctor    Problems Reconciled  Plan  · Orders  o ACO-39: Current medications updated and reviewed () - - 06/17/2020  · Medications  o Medications have been Reconciled  o Transition of Care or Provider Policy  · Instructions  o Patient was educated/instructed on their diagnosis, treatment and medications prior to " discharge from the clinic today.  o Call the office with any concerns or questions.  · Disposition  o Call or Return if symptoms worsen or persist.  o follow up as needed            Electronically Signed by: Romi Rbuio MD -Author on June 17, 2020 02:24:02 PM

## 2021-05-13 NOTE — PROGRESS NOTES
"   Progress Note      Patient Name: Sowmya Hodges   Patient ID: [id number]   Sex: Female   YOB: 1950    Primary Care Provider: Romi Rubio MD   Referring Provider: Chloe VENTURA    Visit Date: October 6, 2020    Provider: LORENZO SANTANA   Location: [facility]   Location Address: [address] [place] [address], [address]  [place], [place]  [id number]   Location Phone: [phone]          Chief Complaint  · \"i have some white discharge\"  · \"its burning when i pee\"      History Of Present Illness  Sowmya Hodges is a 70 year old /White female who presents for evaluation and treatment of:      Acute visit    2-3 days of vaginal irritation with vaginal discharge that is white in color; last yeast infection in July.   Blood glucose usually 140.   Diet is okay per the patient.   Diabetic with faxiga, januvia, and ozempic.     Denies fever, nausea, vomiting, diarrhea.    Patient reports occasional right-sided chest pain that is resting that is a dull ache comes for a few seconds and then resolves.  Patient has no other associated symptoms with the right-sided chest pain.  Patient does have reflux and is taking medication for her reflux.  Nothing makes the pain better or worse and it only lasts for a few seconds.  Patient sees  for cardiologist and has not had a follow up in a while.       Past Medical History  Disease Name Date Onset Notes   Anemia --  --    Anxiety --  --    Arthritis --  --    Constipation --  --    Depression --  --    Diabetes --  --    Diverticulitis --  --    Epilepsy --  --    GERD (gastroesophageal reflux disease) --  --    Heart Disease --  --    HTN (hypertension) --  --    Hyperlipidemia --  --    Limb Swelling --  --    Lower abdominal pain --  --    TAYLOR (obstructive sleep apnea) --  --    Renal cell adenocarcinoma, right 12/10/2014 --    Right Distal Fibula Fracture 09/26/2017 --    Screening for breast cancer 1/6/2020 normal "   Seasonal allergies --  --    Shortness of Breath --  --          Past Surgical History  Procedure Name Date Notes   Cholecystectomy --  --    Colonoscopy 4.12.2017 Dr. Oneil   EGD 4.12.2017 Dr. Oneil   Eye Implant --  yes   Gallbladder --  --    heart surgery --  --    Heart Valves --  --    Joint Surgery --  --    Knee replacement, right --  --    Nephrectomy, Patial Right 12/2/14 Robotic Right Partial Nephrectomy w/    Open Heart Surgery --  open heart at age 4   Salivary Gland Surgery --  --    Tonsilectomy --  --    Tonsillectomy --  --    Wrist --  Left         Medication List  Name Date Started Instructions   Accu-Chek Sabrina Plus test strp miscellaneous strip 09/10/2018 use as directed tid prn DX E11.9   Blood Glucose Test miscellaneous strip 07/01/2019 use as directed test tid prn DX E11.9   buspirone 15 mg oral tablet 06/05/2020 TAKE 1 TABLET BY MOUTH EVERY DAY   Diflucan 150 mg oral tablet 10/06/2020 take 1 tablet by oral route once repeat in 3 days if symptoms persist.   Farxiga 10 mg oral tablet 08/10/2020 TAKE 1 TABLET(10 MG) BY MOUTH EVERY DAY IN THE MORNING   Flonase Allergy Relief 50 mcg/actuation nasal spray,suspension 11/15/2019 spray 1 - 2 sprays (50 - 100 mcg) in each nostril by intranasal route once daily as needed   fluticasone propionate 50 mcg/actuation nasal spray,suspension 08/18/2020 SHAKE LIQUID AND USE 1 TO 2 SPRAYS(50  MCG) IN EACH NOSTRIL EVERY DAY AS NEEDED   gemfibrozil 600 mg oral tablet 07/28/2020 TAKE ONE TABLET BY MOUTH TWICE A DAY, 30 MINS BEFORE MORNING AND EVENING MEALS   Klonopin 1 mg oral tablet 10/01/2020 take 1 tablet by oral route 2 times a day as needed for 30 days   Lancets,Ultra Thin 26 gauge miscellaneous misc 10/24/2018 use as directed test tid prn DX E11.9   levetiracetam 750 mg oral tablet 10/05/2020 TAKE 1 TABLET(750 MG) BY MOUTH TWICE DAILY   levocetirizine 5 mg oral tablet 07/13/2020 TAKE 1 TABLET BY MOUTH EVERY EVENING   Lexapro 20 mg oral  tablet 2020 take 1 tablet (20 mg) by oral route once daily for 90 days   Linzess 145 mcg oral capsule 2020 TAKE 1 CAPSULE BY MOUTH EVERY DAY ON EMPTY STOMACH AT LEAST 30 MINUTES BEFORE 1ST MEAL OF DAY   lisinopril 2.5 mg oral tablet 2020 TAKE 1 TABLET BY MOUTH ONCE DAILY   Motegrity 2 mg oral tablet 2020 take 1 tablet (2 mg) by oral route once daily for 30 days   multivitamin oral tablet  take 1 tablet by oral route daily   Nexium 20 mg oral capsule,delayed release(DR/EC)  --    Ozempic 1 mg/dose (2 mg/1.5 mL) subcutaneous pen injector 10/08/2020 inject 1 mg by sub-q route once weekly on the same day each week, in the abd, thighs, or upper arm rotating injection sites for 90 days   Percocet 7.5-325 mg oral tablet  take 1 tablet by oral route every 6 hours as needed   Shingrix (PF) 50 mcg/0.5 mL intramuscular suspension for reconstitution 2020 inject 0.5 milliliter (50 mcg) by intramuscular route once repeat 0.5 mL dose 2 to 6 months after the first dose (total of 2 doses)   Vitamin D3 5,000 unit oral tablet  take 1 tablet by oral route daily         Allergy List  Allergen Name Date Reaction Notes   Codiene --  --  --          Family Medical History  Disease Name Relative/Age Notes   Prostate cancer Father/70s   --    Gallbladder Disorder Mother/60   Cancer-  64   Family history of certain chronic disabling diseases; arthritis Brother/  Father/  Mother/  Sister/   Mother; Father; Sister; Brother   No family history of colorectal cancer  --          Social History  Finding Status Start/Stop Quantity Notes   Alcohol Never --/-- --  2020 - 10/03/2019 - does not drink, less than 1 drink per day, has been drinking for less than 1 year  2019 - 2018 -    Claustophobic Unknown --/-- --  yes   lives alone --  --/-- --  --    Recreational Drug Use Never --/-- --  2020 - 2019 - 2019 - 2018 - 2018 - none no   Retired --  --/-- --  --    Single  "--  --/-- --  --    Tobacco Former --/-- 1.5 PPD 05/20/2020 - former smoker, 1.5 packs per day, smoked 4 years  former smoker, 1.5 packs per day, smoked 4 years  former smoker         Immunizations  NameDate Admin Mfg Trade Name Lot Number Route Inj VIS Given VIS Publication   [id number]/24/2018 [facility] FLUZONE-HIGH DOSE [id number] IM LD 10/24/2018 08/19/2014   Comments: Pt tolerated well, left office in stable condition.   Prevnar 1309/21/2017 [facility] PREVNAR 13 [id number] IM RD 09/21/2017 11/05/2015   Comments: tolerated well         Review of Systems  · Constitutional  o Denies  o : fever, fatigue, weight loss, weight gain  · Cardiovascular  o Admits  o : chest pressure  o Denies  o : lower extremity edema, claudication, palpitations  · Respiratory  o Denies  o : shortness of breath, wheezing, cough, hemoptysis, dyspnea on exertion  · Gastrointestinal  o Denies  o : nausea, vomiting, diarrhea, constipation, abdominal pain  · Genitourinary  o Admits  o : vaginal discharge, vaginal irritation  o Denies  o : urgency, frequency, dysuria, hematuria      Vitals  Date Time BP Position Site L\R Cuff Size HR RR TEMP (F) WT  HT  BMI kg/m2 BSA m2 O2 Sat FR L/min FiO2 HC       06/17/2020 01:53 /74 Sitting    70 - R  98.6 250lbs 4oz 5'  7\" 39.19 2.32 98 %      07/28/2020 08:23 /72 Sitting    82 - R  97.6 252lbs 2oz 5'  7\" 39.49 2.33 93 %  21%    10/06/2020 01:09 /88 Sitting    79 - R  97.9 251lbs 16oz 5'  7\" 39.47 2.32 98 %  21%          Physical Examination  · Constitutional  o Appearance  o : no acute distress, well-nourished, overweight  · Head and Face  o Head  o :   § Inspection  § : atraumatic, normocephalic  · Eyes  o Eyes  o : extraocular movements intact, no scleral icterus, no conjunctival injection  · Ears, Nose, Mouth and Throat  o Ears  o :   § External Ears  § : normal  § Otoscopic Examination  § : tympanic membrane appearance within normal limits bilaterally  o Nose  o :   § Intranasal Exam  § : nares " patent  o Oral Cavity  o :   § Oral Mucosa  § : moist mucous membranes  o Throat  o :   § Oropharynx  § : no inflammation or lesions present, tonsils within normal limits  · Respiratory  o Respiratory Effort  o : breathing comfortably, symmetric chest rise  o Auscultation of Lungs  o : clear to asculatation bilaterally, no wheezes, rales, or rhonchii  · Cardiovascular  o Heart  o :   § Auscultation of Heart  § : regular rate and rhythm, no murmurs, rubs, or gallops  o Peripheral Vascular System  o :   § Extremities  § : no edema  · Gastrointestinal  o Abdominal Examination  o :   § Abdomen  § : bowel sounds present, non-distended, non-tender  · Lymphatic  o Neck  o : no lymphadenopathy present  o Supraclavicular Nodes  o : no supraclavicular nodes  · Skin and Subcutaneous Tissue  o General Inspection  o : no lesions present, no areas of discoloration, skin turgor normal  · Neurologic  o Mental Status Examination  o :   § Orientation  § : grossly oriented to person, place and time  o Gait and Station  o :   § Gait Screening  § : normal gait  · Psychiatric  o General  o : normal mood and affect          Results  · In-Office Procedures  o Lab procedure  § IOP - Urinalysis without Microscopy (Clinitek) [facility] ([id number])   § Color Ur: Yellow   § Clarity Ur: Clear   § Glucose Ur Ql Strip: >=1000 mg/dL   § Bilirub Ur Ql Strip: Negative   § Ketones Ur Ql Strip: Negative   § Sp Gr Ur Qn: 1.010   § Hgb Ur Ql Strip: Negative   § pH Ur-LsCnc: 6.0   § Prot Ur Ql Strip: Negative   § Urobilinogen Ur Strip-mCnc: 0.2 E.U./dL   § Nitrite Ur Ql Strip: Negative   § WBC Est Ur Ql Strip: Negative       Assessment  · Chest pain     786.50/R07.9  Will perform an EKG in the office. Educated patient on worrisome signs and symptoms and when to report to the ER. Patient following up with Dr. Rubio in 1 to 2 weeks  · Diabetes mellitus, type 2     250.00/E11.9  Will increase patient's Ozempic from 0.5 mg to 1 mg to help get better control of her  diabetes. Samples given of increased dose of ozempic. Januvia was stopped due to being on a GLP1. We did discuss possibly coming off of the Farxiga due to the yeast infections occurring in the past 3 months. Patient will be set up for follow up with Dr. Rubio within 1-2 weeks to discuss diabetes management. Labs ordered including CBC, CMP, lipid, A1c, thyroid profile, microalbumin.  · Vaginal irritation     623.9/N89.8  · Vaginal candida     112.1/B37.3  will treat with Diflucan. 1-2 week follow up.       Plan  · Orders  o Diabetes 1 Panel (CMP, Lipid, A1c) [facility] ([id number], 90810, 93718) - 250.00/E11.9 - 10/06/2020  o CBC with Auto Diff [facility] ([id number]) - 250.00/E11.9 - 10/06/2020  o Urine microalbumin (43168) - 250.00/E11.9 - 10/06/2020  o Thyroid Profile (42917, THYII, 59807) - 250.00/E11.9 - 10/06/2020  o ACO-39: Current medications updated and reviewed (1159F, ) - - 10/06/2020  o ACO-14: Influenza immunization administered or previously received [facility] () - - 10/06/2020  o EKG (Recording and Interpretation) [facility] (Done and read at [facility]) (31671) - 250.00/E11.9, 786.50/R07.9 - 10/06/2020   Rate 68 normal sinus rhythm NM interval 206 normal P axis no comparison  o Vaginal Screen (Candida, Gardnerella & Trichomonas) (75048) - 623.9/N89.8, 112.1/B37.3 - 10/06/2020  · Medications  o Ozempic 1 mg/dose (2 mg/1.5 mL) subcutaneous pen injector   SIG: inject 1 mg by subcutaneous route once weekly on the same day of each week, in the abdomen, thighs, or upper arm rotating injection sites for 90 days   DISP: (1) Box with 3 refills  Adjusted on 10/06/2020     o Diflucan 150 mg oral tablet   SIG: take 1 tablet by oral route once repeat in 3 days if symptoms persist.   DISP: (2) Tablet with 0 refills  Refilled on 10/06/2020     o Januvia 100 mg oral tablet   SIG: TAKE 1 TABLET BY MOUTH EVERY DAY   DISP: (90) Tablet with -1 refills  Discontinued on 10/06/2020     o Medications have been Reconciled  o Transition of Care or Provider  Policy  · Instructions  o Continue blood sugar monitoring daily and record. Bring your log to office visits. Call the office for readings below 70 and above 250 or any complications.  o Daily foot care. Avoid walking barefoot. Annual Dilated Eye Exam.  o Discussed with patient blood pressure monitoring, hemoglobin A1C levels need to be below 7.0, and LDL (Lipid) goals below 70.  o Rest. Increase Fluids.  o Patient was educated/instructed on their diagnosis, treatment and medications prior to discharge from the clinic today.  o Call the office with any concerns or questions.  o Electronically Identified Patient Education Materials Provided Electronically  · Disposition  o Call or Return if symptoms worsen or persist.  o Meds sent to pharmacy  o 1 week follow up  o Dr. Rubio patient            Electronically Signed by: LORENZO SANTANA -Author on October 8, 2020 01:16:22 PM

## 2021-05-13 NOTE — PROGRESS NOTES
Progress Note      Patient Name: Sowmya Hodges   Patient ID: [id number]   Sex: Female   YOB: 1950    Primary Care Provider: Romi Rubio MD   Referring Provider: Chloe VENTURA    Visit Date: November 23, 2020    Provider: LORENZO Powell   Location: [facility]   Location Address: [address] [place] [address]  [id number]   Location Phone: [phone]          Chief Complaint  · lower ABD pain       History Of Present Illness  Sowmya Hodges is a 70 year old /White female who presents to the office today.        Patient initially presented in 2017 with abdominal pain and heartburn.  Gastric emptying scan December 2017 within normal limits.  EGD/colonoscopy 4/12/2017: Erosive gastritis--stomach biopsy with mild reactive gastropathy otherwise negative, duodenal biopsy negative; good prep and negative--in recall for 10 yrs        PMH significant for renal cell carcinoma 2014 status post partial right nephrectomy, follows w DR Butts.    Patient was last seen April 2020, she reported never receiving Motegrity that had been prescribed in February.  She was having complaints still of abdominal pain and bloating, was taking Linzess 145 mcg/day and it was working pretty well.  Liver work-up at the time of last visit was still pending, but it all returned to normal.  This was ordered due to fatty liver showing a CT scan that had been done by urology.  Patient had also reported a cousin with cirrhosis and was told he had a genetic issue.  Patient was prescribed hyoscyamine at her last visit for her abdominal discomfort and possible IBS     10/6/2020 CBC unremarkable except WBC 4.78, CMP unremarkable except ALT 47AST 35, alk phos 81, bilirubin 0.36, albumin 4.1    Pt states she developed diarrhea so PCP recommended stopping Linzess, about 1 week ago. Pt states diarrhea has now stopped but she is still having lower abd pain, reminds her of menstrual  cycle type pain. Has lost 8# d/t not eating as much w these sx.  Was given dicyclomine and it helped some, but states when wears off pain returns. Not having more than 1 BM /day now. Pt also c/o early satiety which is new. Pt feels like her acid reflux c/o are worse, increased burping and regurgitation with it. NO nausea. Denies HB. Denies Fever.            Past Medical History  Anemia; Anxiety; Arthritis; Constipation; Depression; Diabetes; Diverticulitis; Epilepsy; GERD (gastroesophageal reflux disease); Heart Disease; HTN (hypertension); Hyperlipidemia; Limb Swelling; Lower abdominal pain; TAYLOR (obstructive sleep apnea); Renal cell adenocarcinoma, right; Right Distal Fibula Fracture; Screening for breast cancer; Seasonal allergies; Shortness of Breath         Past Surgical History  Cholecystectomy; Colonoscopy; EGD; Eye Implant; Gallbladder; heart surgery; Heart Valves; Joint Surgery; Knee replacement, right; Nephrectomy, Patial Right; Open Heart Surgery; Salivary Gland Surgery; Tonsilectomy; Tonsillectomy; Wrist         Medication List  Name Date Started Instructions   Accu-Chek Sabrina Plus test strp miscellaneous strip 09/10/2018 use as directed tid prn DX E11.9   Blood Glucose Meter 11/09/2020 use as directed   Blood Glucose Test miscellaneous strip 11/09/2020 use as directed test tid prn DX E11.9   buspirone 15 mg oral tablet 06/05/2020 TAKE 1 TABLET BY MOUTH EVERY DAY   dicyclomine 10 mg oral capsule 11/19/2020 take 1 capsule (10 mg) by oral route 2 times per day as needed   escitalopram oxalate 20 mg oral tablet 10/29/2020 TAKE 1 TABLET BY MOUTH EVERY DAY   Farxiga 10 mg oral tablet 08/10/2020 TAKE 1 TABLET(10 MG) BY MOUTH EVERY DAY IN THE MORNING   Flonase Allergy Relief 50 mcg/actuation nasal spray,suspension 11/15/2019 spray 1 - 2 sprays (50 - 100 mcg) in each nostril by intranasal route once daily as needed   gemfibrozil 600 mg oral tablet 07/28/2020 TAKE ONE TABLET BY MOUTH TWICE A DAY, 30 MINS BEFORE  MORNING AND EVENING MEALS   Klonopin 1 mg oral tablet 11/23/2020 take 1 tablet by oral route 2 times a day as needed for 30 days   Lancets,Ultra Thin 26 gauge miscellaneous misc 10/24/2018 use as directed test tid prn DX E11.9   levetiracetam 750 mg oral tablet 10/29/2020 TAKE 1 TABLET BY MOUTH TWICE A DAY   levocetirizine 5 mg oral tablet 07/13/2020 TAKE 1 TABLET BY MOUTH EVERY EVENING   Lexapro 20 mg oral tablet 07/08/2020 take 1 tablet (20 mg) by oral route once daily for 90 days   Linzess 145 mcg oral capsule 10/27/2020 TAKE 1 CAPSULE BY MOUTH EVERY DAY ON EMPTY STOMACH AT LEAST 30 MINUTES BEFORE 1ST MEAL OF DAY   lisinopril 2.5 mg oral tablet 04/17/2020 TAKE 1 TABLET BY MOUTH ONCE DAILY   multivitamin oral tablet  take 1 tablet by oral route daily   Nexium 20 mg oral capsule,delayed release(DR/EC)  --    Ozempic 1 mg/dose (2 mg/1.5 mL) subcutaneous pen injector 10/08/2020 inject 1 mg by sub-q route once weekly on the same day each week, in the abd, thighs, or upper arm rotating injection sites for 90 days   Percocet 7.5-325 mg oral tablet  take 1 tablet by oral route every 6 hours as needed   Shingrix (PF) 50 mcg/0.5 mL intramuscular suspension for reconstitution 08/06/2020 inject 0.5 milliliter (50 mcg) by intramuscular route once repeat 0.5 mL dose 2 to 6 months after the first dose (total of 2 doses)   Vitamin D3 5,000 unit oral tablet  take 1 tablet by oral route daily         Allergy List  Codiene         Family Medical History  Prostate Cancer; Gallbladder Disorder; Family history of certain chronic disabling diseases; arthritis; No family history of colorectal cancer         Social History  Alcohol (Never); Claustophobic (Unknown); lives alone; Recreational Drug Use (Never); Retired; Single; Tobacco (Former)         Immunizations  Name Date Admin   Influenza 10/24/2018   Influenza 09/21/2017   Prevnar 13 09/21/2017         Review of Systems  · Constitutional  o Admits  o : good general health lately, no  acute distress  · Gastrointestinal  o Denies  o : additional gastrointestinal symptoms except as noted in the HPI  · Psychiatric  o Admits  o : pleasant affect      Physical Examination  · Constitutional  o Appearance  o : well developed, well-nourished, in no acute distress  · Head and Face  o Head  o :   § Inspection  § : atraumatic, normocephalic  · Eyes  o Sclerae  o : sclerae white, no sclerae icterus  · Neck  o Inspection/Palpation  o : supple  · Respiratory  o Respiratory Effort  o : breathing unlabored  o Inspection of Chest  o : normal appearance, no retractions  · Cardiovascular  o Peripheral Vascular System  o :   § Extremities  § : no cyanosis, clubbing or edema  · Gastrointestinal  o Abdominal Examination  o : soft, nontender to palpation--mild tenderness lower abdomen   · Skin and Subcutaneous Tissue  o General Inspection  o : no lesions present, no rashes present  · Neurologic  o Mental Status Examination  o :   § Orientation  § : grossly oriented to person, place and time  § Speech/Language  § : communication ability within normal limits, voice quality normal, articulation of speech normal, no evidence of aphasia  § Attention  § : attention normal, concentration abilities normal  o Sensation  o : grossly intact  o Gait and Station  o :   § Gait Screening  § : normal gait  · Psychiatric  o General  o : Alert and oriented x3  o Mood and Affect  o : Mood and affect are appropriate to circumstances          Assessment  · Pre-op exam     V72.84/Z01.818  · Abdominal pain     789.00/R10.9  lower  · Dyspepsia     536.8/R10.13  · GERD (gastroesophageal reflux disease)     530.81/K21.9  feels worsening  · Weight loss     783.21/R63.4      Plan  · Orders  o CT Abdomen and Pelvis with IV Contrast [facility]; suggest Oral Prep (62364) - - 11/23/2020  o [facility] [place] (42831) - - 11/23/2020  · Medications  o Golytely 236-22.74-6.74 -5.86 gram oral recon soln   SIG: take as directed   DISP: (1) Box with 0  refills  Prescribed on 11/23/2020     o Protonix 40 mg oral tablet,delayed release (DR/EC)   SIG: take 1 tablet (40 mg) by oral route once daily   DISP: (30) Tablet with 3 refills  Prescribed on 11/23/2020     o Nexium 20 mg oral capsule,delayed release(DR/EC)   SIG: ---   DISP: (0) capsule with 0 refills  Discontinued on 11/23/2020     · Instructions  o Please Sign Permit for: EGD/COLONOSCOPY  o Indication: early satiety, burping; lower abd pain  o Surgical Risk and Benefits: Possible risks/complications, benefits, and alternatives to surgical or invasive procedure have been explained to patient and/or legal guardian; Patient has been evaluated and can tolerate anesthesia and/or sedation. Risks, benefits, and alternatives to anesthesia and sedation have been explained to patient and/or legal guardian.  o Follow Up after Procedure.  o Encouraged to follow-up with Primary Care Provider for preventative care.  o Patient was educated/instructed on their diagnosis, treatment and medications prior to discharge from the clinic today.  o Patient instructed to seek medical attention urgently for new or worsening symptoms.  o Drug interaction w Lexapro and Nexium, wanted to increase her to 40 mg so I changed her to Protonix.             Electronically Signed by: LORENZO Powell -Author on November 23, 2020 02:01:06 PM

## 2021-05-13 NOTE — PROGRESS NOTES
"   Progress Note      Patient Name: Sowmya Hodges   Patient ID: [id number]   Sex: Female   YOB: 1950    Primary Care Provider: Romi Rubio MD   Referring Provider: Chloe VENTURA    Visit Date: July 28, 2020    Provider: LORENZO Guzman   Location: [facility]   Location Address: [address] [place] [address], [facility] [address]  [place], [place]  [id number]   Location Phone: [phone]          Chief Complaint  · \"vaginal itching\"  · \"Vaginal discharge\"      History Of Present Illness  Sowmya Hodges is a 69 year old /White female who presents for evaluation and treatment of:      Acute visit    Patient reports vaginal itching and irritation with white discharge that started about 2 days ago.    Patient is a diabetic and reports that she gets yeast infections occasionally.  Blood sugars been running around 130.    Patient denies abdominal pain, urinary frequency, urinary urgency, back pain, nausea, vomiting, fever.    Does report mild dysuria with external vagina irritation.    No over-the-counter medications.       Past Medical History  Disease Name Date Onset Notes   Anemia --  --    Anxiety --  --    Arthritis --  --    Constipation --  --    Depression --  --    Diabetes --  --    Diverticulitis --  --    Epilepsy --  --    GERD (gastroesophageal reflux disease) --  --    Heart Disease --  --    HTN (hypertension) --  --    Hyperlipidemia --  --    Limb Swelling --  --    Lower abdominal pain --  --    TAYLOR (obstructive sleep apnea) --  --    Renal cell adenocarcinoma, right 12/10/2014 --    Right Distal Fibula Fracture 09/26/2017 --    Screening for breast cancer 1/6/2020 normal   Seasonal allergies --  --    Shortness of Breath --  --          Past Surgical History  Procedure Name Date Notes   Cholecystectomy --  --    Colonoscopy 4.12.2017 Dr. Oneil   EGD 4.12.2017 Dr. Oneil   Eye Implant --  yes   Gallbladder --  --    heart surgery --  --    Heart Valves --  --  "   Joint Surgery --  --    Knee replacement, right --  --    Nephrectomy, Patial Right 12/2/14 Robotic Right Partial Nephrectomy w/    Open Heart Surgery --  open heart at age 4   Salivary Gland Surgery --  --    Tonsilectomy --  --    Tonsillectomy --  --    Wrist --  Left         Medication List  Name Date Started Instructions   Accu-Chek Sabrina Plus test strp miscellaneous strip 09/10/2018 use as directed tid prn DX E11.9   Blood Glucose Test miscellaneous strip 07/01/2019 use as directed test tid prn DX E11.9   buspirone 15 mg oral tablet 06/05/2020 TAKE 1 TABLET BY MOUTH EVERY DAY   Farxiga 10 mg oral tablet 04/17/2020 TAKE 1 TABLET(10 MG) BY MOUTH EVERY DAY IN THE MORNING   Flonase Allergy Relief 50 mcg/actuation nasal spray,suspension 11/15/2019 spray 1 - 2 sprays (50 - 100 mcg) in each nostril by intranasal route once daily as needed   gemfibrozil 600 mg oral tablet 07/28/2020 TAKE ONE TABLET BY MOUTH TWICE A DAY, 30 MINS BEFORE MORNING AND EVENING MEALS   Januvia 100 mg oral tablet 07/24/2020 TAKE 1 TABLET BY MOUTH EVERY DAY   Klonopin 1 mg oral tablet 07/24/2020 take 1 tablet by oral route 2 times a day as needed   Lancets,Ultra Thin 26 gauge miscellaneous misc 10/24/2018 use as directed test tid prn DX E11.9   levetiracetam 750 mg oral tablet 07/13/2020 TAKE 1 TABLET(750 MG) BY MOUTH TWICE DAILY   levocetirizine 5 mg oral tablet 07/13/2020 TAKE 1 TABLET BY MOUTH EVERY EVENING   Lexapro 20 mg oral tablet 07/08/2020 take 1 tablet (20 mg) by oral route once daily for 90 days   Linzess oral  --    lisinopril 2.5 mg oral tablet 04/17/2020 TAKE 1 TABLET BY MOUTH ONCE DAILY   Motegrity 2 mg oral tablet 02/20/2020 take 1 tablet (2 mg) by oral route once daily for 30 days   multivitamin oral tablet  take 1 tablet by oral route daily   Nexium 20 mg oral capsule,delayed release(DR/EC)  --    Ozempic 0.25 mg or 0.5 mg(2 mg/1.5 mL) subcutaneous pen injector 07/28/2020 inject 0.5 mg by subcutaneous route once  weekly on the same day of each week   Percocet 7.5-325 mg oral tablet  take 1 tablet by oral route every 6 hours as needed   Vitamin D3 5,000 unit oral tablet  take 1 tablet by oral route daily         Allergy List  Allergen Name Date Reaction Notes   Codiene --  --  --        Allergies Reconciled  Family Medical History  Disease Name Relative/Age Notes   Prostate cancer Father/70s   --    Gallbladder Disorder Mother/60   Cancer-  64   Family history of certain chronic disabling diseases; arthritis Brother/  Father/  Mother/  Sister/   Mother; Father; Sister; Brother   No family history of colorectal cancer  --          Social History  Finding Status Start/Stop Quantity Notes   Alcohol Never --/-- --  2020 - 10/03/2019 - does not drink, less than 1 drink per day, has been drinking for less than 1 year  2019 - 2018 -    Claustophobic Unknown --/-- --  yes   lives alone --  --/-- --  --    Recreational Drug Use Never --/-- --  2020 - 2019 - 2019 - 2018 - 2018 - none no   Retired --  --/-- --  --    Single --  --/-- --  --    Tobacco Former --/-- 1.5 PPD 2020 - former smoker, 1.5 packs per day, smoked 4 years  former smoker, 1.5 packs per day, smoked 4 years  former smoker         Immunizations  NameDate Admin Mfg Trade Name Lot Number Route Inj VIS Given VIS Publication   [id number]/ [facility] FLUZONE-HIGH DOSE [id number]  LD 10/24/2018 2014   Comments: Pt tolerated well, left office in stable condition.   Oxjicoaso43/21/2017 [facility] FLUZONE-HIGH DOSE [id number] IM LD 2017   Comments: tolerated well   Prevnar [id number] [facility] PREVNAR 13 [id number] IM RD 2017   Comments: tolerated well         Review of Systems  · Constitutional  o Denies  o : fever, fatigue, weight loss, weight gain  · Cardiovascular  o Denies  o : lower extremity edema, claudication, chest pressure, palpitations  · Respiratory  o Denies  o : shortness of  "breath, wheezing, frequent cough, hemoptysis, dyspnea on exertion  · Gastrointestinal  o Denies  o : nausea, vomiting, diarrhea, constipation, abdominal pain  · Genitourinary  o Admits  o : dysuria, vaginal discharge, vaginal irritation  o Denies  o : urinary urgency, urinary frequency, hematuria      Vitals  Date Time BP Position Site L\R Cuff Size HR RR TEMP (F) WT  HT  BMI kg/m2 BSA m2 O2 Sat HC       02/14/2020 02:08 /72 Sitting    72 - R  97.4 260lbs 4oz 5'  7\" 40.76 2.36 96 %    02/20/2020 02:24 /44 Sitting    89 - R   257lbs 0oz 5'  7\" 40.25 2.35     06/17/2020 01:53 /74 Sitting    70 - R  98.6 250lbs 4oz 5'  7\" 39.19 2.32 98 %    07/28/2020 08:23 /72 Sitting    82 - R  97.6 252lbs 2oz 5'  7\" 39.49 2.33 93 %          Physical Examination  · Constitutional  o Appearance  o : no acute distress, well-nourished  · Head and Face  o Head  o :   § Inspection  § : atraumatic, normocephalic  · Eyes  o Eyes  o : extraocular movements intact, no scleral icterus, no conjunctival injection  · Ears, Nose, Mouth and Throat  o Ears  o :   § External Ears  § : normal  o Nose  o :   § Intranasal Exam  § : nares patent  o Oral Cavity  o :   § Oral Mucosa  § : moist mucous membranes  · Respiratory  o Respiratory Effort  o : breathing comfortably, symmetric chest rise  o Auscultation of Lungs  o : clear to asculatation bilaterally, no wheezes, rales, or rhonchii  · Cardiovascular  o Heart  o :   § Auscultation of Heart  § : regular rate and rhythm, no murmurs, rubs, or gallops  o Peripheral Vascular System  o :   § Extremities  § : no edema  · Gastrointestinal  o Abdominal Examination  o :   § Abdomen  § : bowel sounds present, non-distended, non-tender  · Lymphatic  o Neck  o : no lymphadenopathy present  o Supraclavicular Nodes  o : no supraclavicular nodes  · Skin and Subcutaneous Tissue  o General Inspection  o : no lesions present, no areas of discoloration, skin turgor normal  · Neurologic  o Mental " Status Examination  o :   § Orientation  § : grossly oriented to person, place and time  o Gait and Station  o :   § Gait Screening  § : normal gait  · Psychiatric  o General  o : normal mood and affect          Results  · In-Office Procedures  o Lab procedure  § IOP - Urinalysis without Microscopy (Clinitek) [facility] (01621)   § Color Ur: Yellow   § Clarity Ur: Clear   § Glucose Ur Ql Strip: 500 mg/dL   § Bilirub Ur Ql Strip: Negative   § Ketones Ur Ql Strip: Negative   § Sp Gr Ur Qn: 1.020   § Hgb Ur Ql Strip: Negative   § pH Ur-LsCnc: 6.5   § Prot Ur Ql Strip: Negative   § Urobilinogen Ur Strip-mCnc: 0.2 E.U./dL   § Nitrite Ur Ql Strip: Negative   § WBC Est Ur Ql Strip: Negative       Assessment  · Diabetes mellitus, type 2     250.00/E11.9  · Vaginal candida     112.1/B37.3  Patient performed self swab in the office. Diflucan prescribed. Will review lab results when they return. Patient will follow-up if no improvement in the next 3 days. Patient educated to not take the Diflucan with her Lexapro.  · Vaginal discharge     623.5/N89.8  · Vaginal irritation     623.9/N89.8    Problems Reconciled  Plan  · Orders  o ACO-39: Current medications updated and reviewed () - - 07/28/2020  o Vaginal Screen (Candida, Gardnerella & Trichomonas) (05700) - 112.1/B37.3, 623.5/N89.8, 623.9/N89.8, 250.00/E11.9 - 07/28/2020  · Medications  o Diflucan 150 mg oral tablet   SIG: take 1 tablet by oral route once repeat in 3 days if symptoms persist.   DISP: (2) tablets with 0 refills  Prescribed on 07/28/2020     o Ozempic 0.25 mg or 0.5 mg(2 mg/1.5 mL) subcutaneous pen injector   SIG: inject 0.5 mg by subcutaneous route once weekly on the same day of each week   DISP: (3) 1.5 ml syringe with 3 refills  Refilled on 07/28/2020     o Medications have been Reconciled  o Transition of Care or Provider Policy  · Instructions  o Continue blood sugar monitoring daily and record. Bring your log to office visits. Call the office for readings  below 70 and above 250 or any complications.  o Patient was educated/instructed on their diagnosis, treatment and medications prior to discharge from the clinic today.  o Call the office with any concerns or questions.  · Disposition  o Call or Return if symptoms worsen or persist.  o Meds sent to pharmacy  o As Needed for Follow Up            Electronically Signed by: Polina Mitchell APRN -Author on July 28, 2020 08:46:57 AM

## 2021-05-13 NOTE — PROGRESS NOTES
"   Progress Note      Patient Name: Sowmya Hodges   Patient ID: [id number]   Sex: Female   YOB: 1950    Primary Care Provider: Romi Rubio MD   Referring Provider: Chloe VENTURA    Visit Date: November 19, 2020    Provider: Mari Sims MD   Location: [facility]   Location Address: [address] [place] [address]  [id number]   Location Phone: [phone]          Chief Complaint  · \"Over a week ago I had diarrhea for four days and abdominal pain.\"      History Of Present Illness  Sowmya Hodges is a 70 year old /White female who presents for evaluation and treatment of:      Diarrhea:  one week ago had diarrhea for 4 days, about 1-2 BM/day. Stools were softer than normal and had a couple episodes of watery stools. She also had lower abdominal pain, \"felt like I was having my period\". Denies noticing any blood in her stools or mucous. Reports abdominal pain improved after defecation. Denies fevers or chills. Denies any nausea or vomiting.     She is typically on Linzess for constipation which she says is related current use of opiods rx for pain. She took herself off linzess for 3 days with resolution of diarrhea and return of soft stools. She restarted her Linzess on Saturday (5 days ago) and has been taking it every other day and reports return of the watery stools. She has been on Linzess for 2-3 years and denies ever experiencing similar symptoms.     Reports she has lost 7 lbs throughout the course of her illness. Endorses decrease appetite. Reports she has been changing her diet even before to her GI symptoms to include more vegetables, fruits and broiled meats to achieve weight loss. She is following a bland and low residue diet currently.     She is not sure when her last colonoscopy was but denies any abnormal results although she's had polyps removed.     Denies any changes in her home life recently however shares that the pandemic " has been getting her down.    She is followed by GI, and has follow-up scheduled for next Monday (11/23).       Past Medical History  Disease Name Date Onset Notes   Anemia --  --    Anxiety --  --    Arthritis --  --    Constipation --  --    Depression --  --    Diabetes --  --    Diverticulitis --  --    Epilepsy --  --    GERD (gastroesophageal reflux disease) --  --    Heart Disease --  --    HTN (hypertension) --  --    Hyperlipidemia --  --    Limb Swelling --  --    Lower abdominal pain --  --    TAYLOR (obstructive sleep apnea) --  --    Renal cell adenocarcinoma, right 12/10/2014 --    Right Distal Fibula Fracture 09/26/2017 --    Screening for breast cancer 1/6/2020 normal   Seasonal allergies --  --    Shortness of Breath --  --          Past Surgical History  Procedure Name Date Notes   Cholecystectomy --  --    Colonoscopy 4.12.2017 Dr. Oneil   EGD 4.12.2017 Dr. Oneil   Eye Implant --  yes   Gallbladder --  --    heart surgery --  --    Heart Valves --  --    Joint Surgery --  --    Knee replacement, right --  --    Nephrectomy, Patial Right 12/2/14 Robotic Right Partial Nephrectomy w/    Open Heart Surgery --  open heart at age 4   Salivary Gland Surgery --  --    Tonsilectomy --  --    Tonsillectomy --  --    Wrist --  Left         Medication List  Name Date Started Instructions   Accu-Chek Sabrina Plus test strp miscellaneous strip 09/10/2018 use as directed tid prn DX E11.9   Blood Glucose Meter 11/09/2020 use as directed   Blood Glucose Test miscellaneous strip 11/09/2020 use as directed test tid prn DX E11.9   buspirone 15 mg oral tablet 06/05/2020 TAKE 1 TABLET BY MOUTH EVERY DAY   escitalopram oxalate 20 mg oral tablet 10/29/2020 TAKE 1 TABLET BY MOUTH EVERY DAY   Farxiga 10 mg oral tablet 08/10/2020 TAKE 1 TABLET(10 MG) BY MOUTH EVERY DAY IN THE MORNING   Flonase Allergy Relief 50 mcg/actuation nasal spray,suspension 11/15/2019 spray 1 - 2 sprays (50 - 100 mcg) in each nostril by  intranasal route once daily as needed   fluticasone propionate 50 mcg/actuation nasal spray,suspension 08/18/2020 SHAKE LIQUID AND USE 1 TO 2 SPRAYS(50  MCG) IN EACH NOSTRIL EVERY DAY AS NEEDED   gemfibrozil 600 mg oral tablet 07/28/2020 TAKE ONE TABLET BY MOUTH TWICE A DAY, 30 MINS BEFORE MORNING AND EVENING MEALS   Klonopin 1 mg oral tablet 10/01/2020 take 1 tablet by oral route 2 times a day as needed for 30 days   Lancets,Ultra Thin 26 gauge miscellaneous misc 10/24/2018 use as directed test tid prn DX E11.9   levetiracetam 750 mg oral tablet 10/29/2020 TAKE 1 TABLET BY MOUTH TWICE A DAY   levocetirizine 5 mg oral tablet 07/13/2020 TAKE 1 TABLET BY MOUTH EVERY EVENING   Lexapro 20 mg oral tablet 07/08/2020 take 1 tablet (20 mg) by oral route once daily for 90 days   Linzess 145 mcg oral capsule 10/27/2020 TAKE 1 CAPSULE BY MOUTH EVERY DAY ON EMPTY STOMACH AT LEAST 30 MINUTES BEFORE 1ST MEAL OF DAY   lisinopril 2.5 mg oral tablet 04/17/2020 TAKE 1 TABLET BY MOUTH ONCE DAILY   Motegrity 2 mg oral tablet 02/20/2020 take 1 tablet (2 mg) by oral route once daily for 30 days   multivitamin oral tablet  take 1 tablet by oral route daily   Nexium 20 mg oral capsule,delayed release(DR/EC)  --    Ozempic 1 mg/dose (2 mg/1.5 mL) subcutaneous pen injector 10/08/2020 inject 1 mg by sub-q route once weekly on the same day each week, in the abd, thighs, or upper arm rotating injection sites for 90 days   Percocet 7.5-325 mg oral tablet  take 1 tablet by oral route every 6 hours as needed   Shingrix (PF) 50 mcg/0.5 mL intramuscular suspension for reconstitution 08/06/2020 inject 0.5 milliliter (50 mcg) by intramuscular route once repeat 0.5 mL dose 2 to 6 months after the first dose (total of 2 doses)   Vitamin D3 5,000 unit oral tablet  take 1 tablet by oral route daily         Allergy List  Allergen Name Date Reaction Notes   Codiene --  --  --        Allergies Reconciled  Family Medical History  Disease Name  Relative/Age Notes   Prostate Cancer Father/70s   --    Gallbladder Disorder Mother/60   Cancer-  64   Family history of certain chronic disabling diseases; arthritis Brother/  Father/  Mother/  Sister/   Mother; Father; Sister; Brother   No family history of colorectal cancer  --          Social History  Finding Status Start/Stop Quantity Notes   Alcohol Never --/-- --  2020 - 10/03/2019 - does not drink, less than 1 drink per day, has been drinking for less than 1 year  2019 - 2018 -    Claustophobic Unknown --/-- --  yes   lives alone --  --/-- --  --    Recreational Drug Use Never --/-- --  2020 - 2019 - 2019 - 2018 - 2018 - none no   Retired --  --/-- --  --    Single --  --/-- --  --    Tobacco Former --/-- 1.5 PPD 2020 - former smoker, 1.5 packs per day, smoked 4 years  former smoker, 1.5 packs per day, smoked 4 years  former smoker         Immunizations  NameDate Admin Mfg Trade Name Lot Number Route Inj VIS Given VIS Publication   [id number]/ PMC FLUZONE-HIGH DOSE [id number] IM LD 10/24/2018 2014   Comments: Pt tolerated well, left office in stable condition.   Prevnar [id number] [facility] PREVNAR 13 [id number] IM RD 2017   Comments: tolerated well         Review of Systems  · Constitutional  o Admits  o : weight loss  o Denies  o : fever, fatigue, weight loss,  · Cardiovascular  o Denies  o : lower extremity edema, claudication, chest pressure, palpitations  · Respiratory  o Admits  o : chronic dry cough  o Denies  o : shortness of breath, wheezing dyspnea on exertion  · Gastrointestinal  o Admits  o : diarrhea   o Denies  o : nausea, vomiting, constipation, abdominal pain  · Genitourinary  o Denies  o : urinary urgency, urinary frequency, dysuria  · Neurologic  o Denies  o : incoordination, dizziness, loss of balance      Vitals  Date Time BP Position Site L\R Cuff Size HR RR TEMP (F) WT  HT  BMI kg/m2 BSA m2 O2 Sat FR  "L/min FiO2 HC       10/06/2020 01:09 /88 Sitting    79 - R  97.9 251lbs 16oz 5'  7\" 39.47 2.32 98 %  21%    10/16/2020 11:40 /66 Sitting    76 - R  96.8 253lbs 16oz 5'  7\" 39.78 2.33 95 %      11/19/2020 10:22 /78 Sitting    71 - R  97.8 246lbs 4oz 5'  7\" 38.57 2.3 98 %  21%          Physical Examination  · Constitutional  o Appearance  o : no acute distress, well-nourished  · Head and Face  o Head  o :   § Inspection  § : atraumatic, normocephalic  · Ears, Nose, Mouth and Throat  o Ears  o :   § External Ears  § : normal  · Respiratory  o Respiratory Effort  o : breathing comfortably, symmetric chest rise  o Auscultation of Lungs  o : clear to asculatation bilaterally, no wheezes, rales, or rhonchii  · Cardiovascular  o Heart  o :   § Auscultation of Heart  § : regular rate and rhythm, no murmurs, rubs, or gallops  o Peripheral Vascular System  o :   § Extremities  § : no edema  · Gastrointestinal  o Abdominal Examination  o : normal bowel sounds, abdomen is soft, tender in the RUQ, mid-epigastric area and suprapubic area. No rebound tenderness, no guarding.   · Neurologic  o Mental Status Examination  o :   § Orientation  § : grossly oriented to person, place and time  o Gait and Station  o :   § Gait Screening  § : normal gait          Assessment  · Abdominal pain     789.00/R10.9  In the setting of acute GI illness. 1 mild tenderness over areas noted above otherwise exam was unremarkable. Patient with 4 prior history of chronic constipation and abdominal discomfort which is suspicious for possible IBS. Will trial dicyclomine 10mg bid prn (lower dose given age and other comorbidities). Reviewed most common AE of this agent with patient.  · Diarrhea     787.91/R19.7  Acute and recurring. Likely secondary to Linzess use given resolution of symptoms with cessation of this agent. No recent hospitalization or abx, which decreases the likelihood for c-diff. Differential include microscopic colitis " (PPI and SSRI use as potential culprits), versus conversion from IBSC to IBSD, although further evaluation to r/o other conditions would be required prior to settling on IBSs for diagnosis. Patient to dc linzess, until her f/u with GI.     Problems Reconciled  Plan  · Orders  o ACO-39: Current medications updated and reviewed (, 1159F) - - 11/19/2020  · Medications  o dicyclomine 10 mg oral capsule   SIG: take 1 capsule (10 mg) by oral route 2 times per day as needed   DISP: (6) Capsule with 0 refills  Prescribed on 11/19/2020     o Medications have been Reconciled  o Transition of Care or Provider Policy  · Instructions  o Instructed to seek medical attention urgently for new or worsening symptoms.  o Patient was educated/instructed on their diagnosis, treatment and medications prior to discharge from the clinic today.  · Disposition  o Call or Return if symptoms worsen or persist.            Electronically Signed by: Mari Sims MD -Author on November 19, 2020 01:14:47 PM

## 2021-05-14 VITALS
OXYGEN SATURATION: 97 % | DIASTOLIC BLOOD PRESSURE: 60 MMHG | HEART RATE: 84 BPM | HEIGHT: 67 IN | RESPIRATION RATE: 16 BRPM | SYSTOLIC BLOOD PRESSURE: 112 MMHG | BODY MASS INDEX: 38.14 KG/M2 | TEMPERATURE: 97.4 F | WEIGHT: 243 LBS

## 2021-05-14 VITALS
SYSTOLIC BLOOD PRESSURE: 128 MMHG | HEIGHT: 67 IN | TEMPERATURE: 98.2 F | HEART RATE: 77 BPM | WEIGHT: 247.5 LBS | BODY MASS INDEX: 38.84 KG/M2 | DIASTOLIC BLOOD PRESSURE: 69 MMHG

## 2021-05-14 VITALS
WEIGHT: 254 LBS | BODY MASS INDEX: 39.87 KG/M2 | OXYGEN SATURATION: 95 % | SYSTOLIC BLOOD PRESSURE: 122 MMHG | TEMPERATURE: 96.8 F | DIASTOLIC BLOOD PRESSURE: 66 MMHG | HEART RATE: 76 BPM | HEIGHT: 67 IN

## 2021-05-14 VITALS
SYSTOLIC BLOOD PRESSURE: 130 MMHG | HEIGHT: 67 IN | DIASTOLIC BLOOD PRESSURE: 78 MMHG | WEIGHT: 246.25 LBS | TEMPERATURE: 97.8 F | BODY MASS INDEX: 38.65 KG/M2 | HEART RATE: 71 BPM | OXYGEN SATURATION: 98 %

## 2021-05-14 VITALS
WEIGHT: 251.25 LBS | BODY MASS INDEX: 39.43 KG/M2 | DIASTOLIC BLOOD PRESSURE: 61 MMHG | TEMPERATURE: 97.6 F | HEIGHT: 67 IN | HEART RATE: 85 BPM | SYSTOLIC BLOOD PRESSURE: 137 MMHG

## 2021-05-14 VITALS
DIASTOLIC BLOOD PRESSURE: 88 MMHG | WEIGHT: 252 LBS | HEIGHT: 67 IN | OXYGEN SATURATION: 98 % | SYSTOLIC BLOOD PRESSURE: 132 MMHG | TEMPERATURE: 97.9 F | BODY MASS INDEX: 39.55 KG/M2 | HEART RATE: 79 BPM

## 2021-05-14 NOTE — PROGRESS NOTES
Progress Note      Patient Name: Sowmya Hodges   Patient ID: [id number]   Sex: Female   YOB: 1950    Primary Care Provider: Romi Rubio MD   Referring Provider: Chloe VENTURA    Visit Date: February 9, 2021    Provider: LORENZO Powell   Location: [facility]   Location Address: [address] [place] [address]  [id number]   Location Phone: [phone]          Chief Complaint  · Follow up       History Of Present Illness  Sowmya Hodges is a 70 year old /White female who presents to the office today.      Patient initially presented in 2017 with abdominal pain and heartburn.  GES December 2017 within normal limits.    PMH significant for renal cell carcinoma 2014 status post partial right nephrectomy, follows w DR Butts.  History of fatty liver seen on CT scan, hepatic work-up has been completed 4/2020 and negative.  Patient did report a cousin with cirrhosis and was told he had a genetic issue    Patient was last seen November 2020 reporting diarrhea developed so her primary care stopped her Linzess, patient stated diarrhea was better but she was having abdominal pain and cramping, lost 8 pounds due to not eating as much with the symptoms, dicyclomine was helpful but pain would return when not taking.  Also felt acid reflux symptoms were worse as well as early satiety.  CT scan and EGD colonoscopy ordered    CT the abdomen and pelvis with contrast 11/24/2020: Fatty liver seen no acute findings    12/2/2020: Stool culture negative, stool for fat negative, C. difficile negative, Giardia and crypto negative, lactoferrin negative, occult blood negative, H. pylori breath test negativeordered by PCP    EGD colonoscopy 12/22/2020: Normal esophagus, erosive gastritisbiopsy with gastropathy, normal duodenumbiopsy with duodenitis; good prep, normal colonoscopy, 3 small adenomatous polyps in the ascending colon completely removed, random colon  biopsies negative    Patient called after scopes reporting she was still having diarrhea and abdominal pain so prescription for Xifaxan was sent in.  Pt states Xifaxin really helped her sx. No abd pain or diarrhea now. No constipation. States normal BM. Pt states she has rec'd RF and started taking again, I explained to take 2 week course only if needed.  Protonix is working well for HB.   Pt denies NSAIDs.       Past Medical History  Anemia; Anxiety; Arthritis; Constipation; Depression; Diabetes; Diverticulitis; Epilepsy; GERD (gastroesophageal reflux disease); Heart Disease; HTN (hypertension); Hyperlipidemia; Limb Swelling; Lower abdominal pain; TAYLOR (obstructive sleep apnea); Renal cell adenocarcinoma, right; Right Distal Fibula Fracture; Screening for breast cancer; Seasonal allergies; Shortness of Breath         Past Surgical History  Cholecystectomy; Colonoscopy; EGD; Eye Implant; Gallbladder; heart surgery; Heart Valves; Joint Surgery; Knee replacement, right; Nephrectomy, Patial Right; Open Heart Surgery; Salivary Gland Surgery; Tonsilectomy; Tonsillectomy; Wrist         Medication List  Name Date Started Instructions   Accu-Chek Sabrina Plus test strp miscellaneous strip 09/10/2018 use as directed tid prn DX E11.9   Blood Glucose Test miscellaneous strip 11/09/2020 use as directed test tid prn DX E11.9   buspirone 15 mg oral tablet 06/05/2020 TAKE 1 TABLET BY MOUTH EVERY DAY   dicyclomine 10 mg oral capsule 01/08/2021 TAKE 1 CAPSULE (10 MG) BY ORAL ROUTE 2 TIMES PER DAY AS NEEDED   escitalopram oxalate 20 mg oral tablet 10/29/2020 TAKE 1 TABLET BY MOUTH EVERY DAY   Farxiga 10 mg oral tablet 12/14/2020 TAKE 1 TABLET(10 MG) BY MOUTH EVERY DAY IN THE MORNING   Flonase Allergy Relief 50 mcg/actuation nasal spray,suspension 11/15/2019 spray 1 - 2 sprays (50 - 100 mcg) in each nostril by intranasal route once daily as needed   gemfibrozil 600 mg oral tablet 01/20/2021 TAKE ONE TABLET BY MOUTH TWICE A DAY, 30 MINS  BEFORE MORNING AND EVENING MEALS   Klonopin 1 mg oral tablet 01/11/2021 take 1 tablet by oral route 2 times a day as needed for 30 days   Lancets,Ultra Thin 26 gauge miscellaneous misc 10/24/2018 use as directed test tid prn DX E11.9   levetiracetam 750 mg oral tablet 12/14/2020 TAKE 1 TABLET BY MOUTH TWICE A DAY   levocetirizine 5 mg oral tablet 07/13/2020 TAKE 1 TABLET BY MOUTH EVERY EVENING   Lexapro 20 mg oral tablet 01/11/2021 take 1 tablet (20 mg) by oral route once daily for 90 days   lisinopril 2.5 mg oral tablet 04/17/2020 TAKE 1 TABLET BY MOUTH ONCE DAILY   multivitamin oral tablet  take 1 tablet by oral route daily   Ozempic 1 mg/dose (2 mg/1.5 mL) subcutaneous pen injector 12/30/2020 inject 1 mg by sub-q route once weekly on the same day each week, in the abd, thighs, or upper arm rotating injection sites for 90 days   Percocet 7.5-325 mg oral tablet  take 1 tablet by oral route every 6 hours as needed   Protonix 40 mg oral tablet,delayed release (DR/EC) 11/23/2020 take 1 tablet (40 mg) by oral route once daily   Shingrix (PF) 50 mcg/0.5 mL intramuscular suspension for reconstitution 08/06/2020 inject 0.5 milliliter (50 mcg) by intramuscular route once repeat 0.5 mL dose 2 to 6 months after the first dose (total of 2 doses)   Vitamin D3 5,000 unit oral tablet  take 1 tablet by oral route daily   Xifaxan 550 mg oral tablet 01/04/2021 take 1 tablet by oral route 3 times a day for 14 days         Allergy List  Codiene       Allergies Reconciled  Family Medical History  Prostate cancer; Gallbladder Disorder; Family history of certain chronic disabling diseases; arthritis; No family history of colorectal cancer         Social History  Alcohol (Never); Claustophobic (Unknown); lives alone; Recreational Drug Use (Never); Retired; Single; Tobacco (Former)         Immunizations  Name Date Admin   Influenza 10/24/2018   Influenza 09/21/2017   Prevnar 13 09/21/2017         Review of  "Systems  · Constitutional  o Admits  o : good general health lately, no acute distress  · Gastrointestinal  o Denies  o : additional gastrointestinal symptoms except as noted in the HPI  · Psychiatric  o Admits  o : pleasant affect      Vitals  Date Time BP Position Site L\R Cuff Size HR RR TEMP (F) WT  HT  BMI kg/m2 BSA m2 O2 Sat FR L/min FiO2 HC       11/23/2020 01:06 /61 Sitting    85 - R  97.6 251lbs 4oz 5'  7\" 39.35 2.32       02/09/2021 08:24 /69 Sitting    77 - R  98.2 247lbs 8oz 5'  7\" 38.76 2.3             Physical Examination  · Constitutional  o Appearance  o : well developed, well-nourished, in no acute distress  · Head and Face  o Head  o :   § Inspection  § : atraumatic, normocephalic  · Eyes  o Sclerae  o : sclerae white, no sclerae icterus  · Neck  o Inspection/Palpation  o : supple  · Respiratory  o Respiratory Effort  o : breathing unlabored  o Inspection of Chest  o : normal appearance, no retractions  · Cardiovascular  o Peripheral Vascular System  o :   § Extremities  § : no cyanosis, clubbing or edema  · Gastrointestinal  o Abdominal Examination  o : soft, nontender to palpation  · Skin and Subcutaneous Tissue  o General Inspection  o : no lesions present, no rashes present  · Neurologic  o Mental Status Examination  o :   § Orientation  § : grossly oriented to person, place and time  § Speech/Language  § : communication ability within normal limits, voice quality normal, articulation of speech normal, no evidence of aphasia  § Attention  § : attention normal, concentration abilities normal  o Sensation  o : grossly intact  o Gait and Station  o :   § Gait Screening  § : normal gait  · Psychiatric  o General  o : Alert and oriented x3  o Mood and Affect  o : Mood and affect are appropriate to circumstances          Assessment  · Diarrhea     787.91/R19.7  · Erosive gastritis     535.40/K29.60  · Fatty liver     571.8/K76.0  · GERD (gastroesophageal reflux " disease)     530.81/K21.9  · Irritable bowel syndrome     564.1/K58.9  · Polyp of ascending colon       Benign neoplasm of ascending colon     211.3/D12.2      Plan  · Medications  o Medications have been Reconciled  o Transition of Care or Provider Policy  · Instructions  o Patient was educated/instructed on their diagnosis, treatment and medications prior to discharge from the clinic today.  o Patient instructed to seek medical attention urgently for new or worsening symptoms.  o Recall: 3 years Colon  o F/U PRN. Advised pt to call if any GI symptoms such as change in bowel pattern, abd pain, wt loss, or blood in stool.   o Encouraged to follow-up with Primary Care Provider for preventative care.  o Xifaxin to be used only as needed, pt started another bottle today, she is improved so she will hold and take only if sx recur.   o Discussed with patient importance of weight loss and low carb diet reviewed with the patient. Written information was given to pt today. Explained to patient risks of fatty liver and the possibility of progression towards cirrhosis. Would recommend pt f/u with PCP for monitoring, and if there are any signs of worsening liver function or development of cirrhosis, please return the pt to our office. Fax note to PCP.             Electronically Signed by: LORENZO Powell -Author on February 12, 2021 12:34:19 PM

## 2021-05-14 NOTE — PROGRESS NOTES
Progress Note      Patient Name: Sowmya Hodges   Patient ID: [id number]   Sex: Female   YOB: 1950    Primary Care Provider: Romi Rubio MD   Referring Provider: Chloe VENTURA    Visit Date: March 1, 2021    Provider: Tamara Singer PA-C   Location: [facility]   Location Address: [address] [place] [address], [address]  [place], [place]  [id number]   Location Phone: [phone]          Chief Complaint  · stye       History Of Present Illness  Sowmya Hodges is a 70 year old /White female who presents for evaluation and treatment of:      stye on right eye x4 days. Looks like a white pimple. It is on the inside upper lid.  It feels itchy.   She is able to move eye around normally.   Slight discharge from the eye.   She has seen some black floaters.   L eye is normal.  Denies fever, uri sx.    DM: bg running in the 150s. Last wk it was 200s.  Not utd on dm eye exam       Past Medical History  Disease Name Date Onset Notes   Anemia --  --    Anxiety --  --    Arthritis --  --    Constipation --  --    Depression --  --    Diabetes --  --    Diverticulitis --  --    Epilepsy --  --    GERD (gastroesophageal reflux disease) --  --    Heart Disease --  --    HTN (hypertension) --  --    Hyperlipidemia --  --    Limb Swelling --  --    Lower abdominal pain --  --    TAYLOR (obstructive sleep apnea) --  --    Renal cell adenocarcinoma, right 12/10/2014 --    Right Distal Fibula Fracture 09/26/2017 --    Screening for breast cancer 1/6/2020 normal   Seasonal allergies --  --    Shortness of Breath --  --          Past Surgical History  Procedure Name Date Notes   Cholecystectomy --  --    Colonoscopy 4.12.2017 Dr. Oneil   EGD 4.12.2017 Dr. Oneil   Eye Implant --  yes   Gallbladder --  --    heart surgery --  --    Heart Valves --  --    Joint Surgery --  --    Knee replacement, right --  --    Nephrectomy, Patial Right 12/2/14 Robotic Right Partial Nephrectomy w/     Open Heart Surgery --  open heart at age 4   Salivary Gland Surgery --  --    Tonsilectomy --  --    Tonsillectomy --  --    Wrist --  Left         Medication List  Name Date Started Instructions   Accu-Chek Sabrina Plus test strp miscellaneous strip 09/10/2018 use as directed tid prn DX E11.9   Blood Glucose Test miscellaneous strip 11/09/2020 use as directed test tid prn DX E11.9   buspirone 15 mg oral tablet 06/05/2020 TAKE 1 TABLET BY MOUTH EVERY DAY   dicyclomine 10 mg oral capsule 01/08/2021 TAKE 1 CAPSULE (10 MG) BY ORAL ROUTE 2 TIMES PER DAY AS NEEDED   escitalopram oxalate 20 mg oral tablet 10/29/2020 TAKE 1 TABLET BY MOUTH EVERY DAY   Farxiga 10 mg oral tablet 12/14/2020 TAKE 1 TABLET(10 MG) BY MOUTH EVERY DAY IN THE MORNING   Flonase Allergy Relief 50 mcg/actuation nasal spray,suspension 11/15/2019 spray 1 - 2 sprays (50 - 100 mcg) in each nostril by intranasal route once daily as needed   gemfibrozil 600 mg oral tablet 01/20/2021 TAKE ONE TABLET BY MOUTH TWICE A DAY, 30 MINS BEFORE MORNING AND EVENING MEALS   Klonopin 1 mg oral tablet 02/26/2021 take 1 tablet by oral route 2 times a day as needed for 30 days   Lancets,Ultra Thin 26 gauge miscellaneous misc 10/24/2018 use as directed test tid prn DX E11.9   levetiracetam 750 mg oral tablet 12/14/2020 TAKE 1 TABLET BY MOUTH TWICE A DAY   levocetirizine 5 mg oral tablet 07/13/2020 TAKE 1 TABLET BY MOUTH EVERY EVENING   Lexapro 20 mg oral tablet 01/11/2021 take 1 tablet (20 mg) by oral route once daily for 90 days   lisinopril 2.5 mg oral tablet 04/17/2020 TAKE 1 TABLET BY MOUTH ONCE DAILY   multivitamin oral tablet  take 1 tablet by oral route daily   Ozempic 1 mg/dose (2 mg/1.5 mL) subcutaneous pen injector 12/30/2020 inject 1 mg by sub-q route once weekly on the same day each week, in the abd, thighs, or upper arm rotating injection sites for 90 days   PANTOPRAZOLE SOD DR 40 MG TAB 02/19/2021 TAKE 1 TABLET BY MOUTH EVERY DAY   Percocet 7.5-325 mg oral  tablet  take 1 tablet by oral route every 6 hours as needed   Protonix 40 mg oral tablet,delayed release (DR/EC) 2020 take 1 tablet (40 mg) by oral route once daily   Shingrix (PF) 50 mcg/0.5 mL intramuscular suspension for reconstitution 2020 inject 0.5 milliliter (50 mcg) by intramuscular route once repeat 0.5 mL dose 2 to 6 months after the first dose (total of 2 doses)   Vitamin D3 5,000 unit oral tablet  take 1 tablet by oral route daily   Xifaxan 550 mg oral tablet 2021 take 1 tablet by oral route 3 times a day for 14 days         Allergy List  Allergen Name Date Reaction Notes   Codiene --  --  --        Allergies Reconciled  Family Medical History  Disease Name Relative/Age Notes   Prostate cancer Father/70s   --    Gallbladder Disorder Mother/60   Cancer-  64   Family history of certain chronic disabling diseases; arthritis Brother/  Father/  Mother/  Sister/   Mother; Father; Sister; Brother   No family history of colorectal cancer  --          Social History  Finding Status Start/Stop Quantity Notes   Alcohol Never --/-- --  2020 - 10/03/2019 - does not drink, less than 1 drink per day, has been drinking for less than 1 year  2019 - 2018 -    Claustophobic Unknown --/-- --  yes   lives alone --  --/-- --  --    Recreational Drug Use Never --/-- --  2020 - 2019 - 2019 - 2018 - 2018 - none no   Retired --  --/-- --  --    Single --  --/-- --  --    Tobacco Former --/-- 1.5 PPD 2020 - former smoker, 1.5 packs per day, smoked 4 years  former smoker, 1.5 packs per day, smoked 4 years  former smoker         Immunizations  NameDate Admin Mfg Trade Name Lot Number Route Inj VIS Given VIS Publication   [id number]/ [facility] FLUZONE-HIGH DOSE [id number] IM LD 10/24/2018 2014   Comments: Pt tolerated well, left office in stable condition.   Prevnar [id number] [facility] PREVNAR 13 [id number] IM RD 2017   Comments:  "tolerated well         Review of Systems  · Eyes  o Admits  o : eye irritation, spots in vision, discharge from eye, eye discomfort  o Denies  o : blurred vision, vision loss, changes in vision      Vitals  Date Time BP Position Site L\R Cuff Size HR RR TEMP (F) WT  HT  BMI kg/m2 BSA m2 O2 Sat FR L/min FiO2 HC       11/23/2020 01:06 /61 Sitting    85 - R  97.6 251lbs 4oz 5'  7\" 39.35 2.32       02/09/2021 08:24 /69 Sitting    77 - R  98.2 247lbs 8oz 5'  7\" 38.76 2.3       03/01/2021 08:22 /60 Sitting    84 - R 16 97.4 242lbs 16oz 5'  7\" 38.06 2.28 97 %            Physical Examination  · Constitutional  o Appearance  o : no acute distress, well-nourished  · Head and Face  o Head  o :   § Inspection  § : atraumatic, normocephalic  · Eyes  o Eyes  o : opens eyes spontaneously. Normal EOM bilaterally. Slight yellow discharge noted with crusting in R eye.  · Ears, Nose, Mouth and Throat  o Ears  o :   § External Ears  § : normal  o Nose  o :   § Intranasal Exam  § : nares patent  o Oral Cavity  o :   § Oral Mucosa  § : moist mucous membranes  · Respiratory  o Respiratory Effort  o : breathing comfortably, symmetric chest rise  o Auscultation of Lungs  o : clear to asculatation bilaterally, no wheezes, rales, or rhonchii  · Cardiovascular  o Heart  o :   § Auscultation of Heart  § : regular rate and rhythm, no murmurs, rubs, or gallops  o Peripheral Vascular System  o :   § Extremities  § : no edema  · Neurologic  o Mental Status Examination  o :   § Orientation  § : grossly oriented to person, place and time  o Gait and Station  o :   § Gait Screening  § : normal gait  · Psychiatric  o General  o : normal mood and affect              Assessment  · Stye     373.11/H00.019  Discussed that most stye do not need tx. Cont warm compress. Will start topical abx drop due to discharge/crusting noted on exam. Pt will let us know if sx worsen or not improved with conservative tx.  · Diabetes     250.92  Discussed " importance of bg control. Take meds as rx. Pt has follow up with Dr. Rubio next wk to review. Pt is going to call [facility] today to get DM eye exam and eval floaters.      Plan  · Orders  o ACO-39: Current medications updated and reviewed (1159F, ) - - 03/01/2021  · Medications  o Polytrim 10,000 unit- 1 mg/mL ophthalmic (eye) drops   SIG: instill 1 drop into affected eye(s) by ophthalmic route every 6 hours   DISP: (1) Metric Drop with 0 refills  Prescribed on 03/01/2021     o Medications have been Reconciled  o Transition of Care or Provider Policy  · Instructions  o Patient was educated/instructed on their diagnosis, treatment and medications prior to discharge from the clinic today.  · Disposition  o Call or Return if symptoms worsen or persist.  o Keep follow up appt as scheduled            Electronically Signed by: Tamara Singer PA-C -Author on March 1, 2021 08:44:40 AM

## 2021-05-15 VITALS
DIASTOLIC BLOOD PRESSURE: 72 MMHG | SYSTOLIC BLOOD PRESSURE: 132 MMHG | HEART RATE: 72 BPM | WEIGHT: 260.25 LBS | BODY MASS INDEX: 40.85 KG/M2 | OXYGEN SATURATION: 96 % | HEIGHT: 67 IN | TEMPERATURE: 97.4 F

## 2021-05-15 VITALS
WEIGHT: 258.12 LBS | TEMPERATURE: 96.7 F | BODY MASS INDEX: 40.51 KG/M2 | OXYGEN SATURATION: 96 % | HEART RATE: 77 BPM | DIASTOLIC BLOOD PRESSURE: 62 MMHG | HEIGHT: 67 IN | SYSTOLIC BLOOD PRESSURE: 122 MMHG

## 2021-05-15 VITALS
TEMPERATURE: 97.9 F | OXYGEN SATURATION: 97 % | WEIGHT: 252 LBS | HEIGHT: 67 IN | RESPIRATION RATE: 16 BRPM | SYSTOLIC BLOOD PRESSURE: 124 MMHG | BODY MASS INDEX: 39.55 KG/M2 | HEART RATE: 81 BPM | DIASTOLIC BLOOD PRESSURE: 76 MMHG

## 2021-05-15 VITALS
BODY MASS INDEX: 40.34 KG/M2 | SYSTOLIC BLOOD PRESSURE: 122 MMHG | HEIGHT: 67 IN | HEART RATE: 89 BPM | DIASTOLIC BLOOD PRESSURE: 44 MMHG | WEIGHT: 257 LBS

## 2021-05-15 VITALS
DIASTOLIC BLOOD PRESSURE: 74 MMHG | BODY MASS INDEX: 39.28 KG/M2 | HEART RATE: 70 BPM | SYSTOLIC BLOOD PRESSURE: 126 MMHG | OXYGEN SATURATION: 98 % | HEIGHT: 67 IN | TEMPERATURE: 98.6 F | WEIGHT: 250.25 LBS

## 2021-05-15 VITALS
TEMPERATURE: 97.9 F | HEART RATE: 75 BPM | OXYGEN SATURATION: 97 % | DIASTOLIC BLOOD PRESSURE: 60 MMHG | BODY MASS INDEX: 40.34 KG/M2 | WEIGHT: 257 LBS | RESPIRATION RATE: 16 BRPM | HEIGHT: 67 IN | SYSTOLIC BLOOD PRESSURE: 108 MMHG

## 2021-05-15 VITALS
WEIGHT: 256 LBS | SYSTOLIC BLOOD PRESSURE: 125 MMHG | BODY MASS INDEX: 40.18 KG/M2 | HEIGHT: 67 IN | HEART RATE: 79 BPM | DIASTOLIC BLOOD PRESSURE: 65 MMHG

## 2021-05-15 VITALS
RESPIRATION RATE: 15 BRPM | DIASTOLIC BLOOD PRESSURE: 64 MMHG | WEIGHT: 253.25 LBS | BODY MASS INDEX: 39.75 KG/M2 | OXYGEN SATURATION: 97 % | HEART RATE: 83 BPM | SYSTOLIC BLOOD PRESSURE: 106 MMHG | HEIGHT: 67 IN | TEMPERATURE: 97.6 F

## 2021-05-15 VITALS
WEIGHT: 252.12 LBS | TEMPERATURE: 97.6 F | SYSTOLIC BLOOD PRESSURE: 122 MMHG | DIASTOLIC BLOOD PRESSURE: 72 MMHG | HEIGHT: 67 IN | OXYGEN SATURATION: 93 % | BODY MASS INDEX: 39.57 KG/M2 | HEART RATE: 82 BPM

## 2021-05-15 VITALS
SYSTOLIC BLOOD PRESSURE: 132 MMHG | BODY MASS INDEX: 40.34 KG/M2 | OXYGEN SATURATION: 96 % | HEART RATE: 64 BPM | WEIGHT: 257 LBS | HEIGHT: 67 IN | RESPIRATION RATE: 14 BRPM | DIASTOLIC BLOOD PRESSURE: 78 MMHG | TEMPERATURE: 97.4 F

## 2021-05-15 VITALS
HEIGHT: 67 IN | WEIGHT: 252.12 LBS | HEART RATE: 75 BPM | BODY MASS INDEX: 39.57 KG/M2 | DIASTOLIC BLOOD PRESSURE: 54 MMHG | SYSTOLIC BLOOD PRESSURE: 112 MMHG

## 2021-05-15 VITALS
WEIGHT: 257 LBS | HEIGHT: 67 IN | HEART RATE: 77 BPM | DIASTOLIC BLOOD PRESSURE: 72 MMHG | SYSTOLIC BLOOD PRESSURE: 118 MMHG | OXYGEN SATURATION: 96 % | TEMPERATURE: 97.6 F | BODY MASS INDEX: 40.34 KG/M2

## 2021-05-15 VITALS
DIASTOLIC BLOOD PRESSURE: 55 MMHG | HEIGHT: 67 IN | WEIGHT: 260.12 LBS | BODY MASS INDEX: 40.83 KG/M2 | SYSTOLIC BLOOD PRESSURE: 109 MMHG

## 2021-05-16 VITALS
TEMPERATURE: 97.2 F | HEIGHT: 67 IN | DIASTOLIC BLOOD PRESSURE: 68 MMHG | HEART RATE: 80 BPM | BODY MASS INDEX: 40.81 KG/M2 | OXYGEN SATURATION: 95 % | WEIGHT: 260 LBS | SYSTOLIC BLOOD PRESSURE: 122 MMHG

## 2021-05-16 VITALS
WEIGHT: 258 LBS | OXYGEN SATURATION: 97 % | RESPIRATION RATE: 16 BRPM | TEMPERATURE: 98.5 F | HEIGHT: 67 IN | HEART RATE: 61 BPM | BODY MASS INDEX: 40.49 KG/M2 | SYSTOLIC BLOOD PRESSURE: 118 MMHG | DIASTOLIC BLOOD PRESSURE: 54 MMHG

## 2021-05-16 VITALS
HEART RATE: 72 BPM | WEIGHT: 257 LBS | HEIGHT: 67 IN | BODY MASS INDEX: 40.34 KG/M2 | DIASTOLIC BLOOD PRESSURE: 51 MMHG | SYSTOLIC BLOOD PRESSURE: 125 MMHG

## 2021-05-16 VITALS
DIASTOLIC BLOOD PRESSURE: 64 MMHG | BODY MASS INDEX: 40.81 KG/M2 | HEART RATE: 78 BPM | RESPIRATION RATE: 16 BRPM | OXYGEN SATURATION: 97 % | SYSTOLIC BLOOD PRESSURE: 124 MMHG | HEIGHT: 67 IN | TEMPERATURE: 99 F | WEIGHT: 260 LBS

## 2021-05-16 VITALS
BODY MASS INDEX: 40.49 KG/M2 | HEIGHT: 67 IN | SYSTOLIC BLOOD PRESSURE: 131 MMHG | RESPIRATION RATE: 16 BRPM | DIASTOLIC BLOOD PRESSURE: 57 MMHG | HEART RATE: 71 BPM | WEIGHT: 258 LBS

## 2021-05-16 VITALS
HEART RATE: 65 BPM | DIASTOLIC BLOOD PRESSURE: 50 MMHG | WEIGHT: 251 LBS | SYSTOLIC BLOOD PRESSURE: 120 MMHG | HEIGHT: 67 IN | BODY MASS INDEX: 39.39 KG/M2

## 2021-05-16 VITALS
SYSTOLIC BLOOD PRESSURE: 126 MMHG | TEMPERATURE: 97.1 F | HEART RATE: 79 BPM | RESPIRATION RATE: 15 BRPM | DIASTOLIC BLOOD PRESSURE: 68 MMHG | BODY MASS INDEX: 40.51 KG/M2 | WEIGHT: 258.12 LBS | HEIGHT: 67 IN | OXYGEN SATURATION: 96 %

## 2021-05-16 VITALS
HEIGHT: 67 IN | WEIGHT: 258 LBS | BODY MASS INDEX: 40.49 KG/M2 | DIASTOLIC BLOOD PRESSURE: 60 MMHG | SYSTOLIC BLOOD PRESSURE: 104 MMHG | HEART RATE: 63 BPM

## 2021-05-16 VITALS
BODY MASS INDEX: 40.24 KG/M2 | TEMPERATURE: 97.9 F | SYSTOLIC BLOOD PRESSURE: 119 MMHG | OXYGEN SATURATION: 99 % | HEART RATE: 56 BPM | RESPIRATION RATE: 16 BRPM | WEIGHT: 256.37 LBS | HEIGHT: 67 IN | DIASTOLIC BLOOD PRESSURE: 69 MMHG

## 2021-05-16 VITALS
RESPIRATION RATE: 12 BRPM | OXYGEN SATURATION: 98 % | WEIGHT: 236.25 LBS | SYSTOLIC BLOOD PRESSURE: 110 MMHG | TEMPERATURE: 97.1 F | DIASTOLIC BLOOD PRESSURE: 70 MMHG | HEART RATE: 80 BPM

## 2021-05-16 VITALS
OXYGEN SATURATION: 97 % | TEMPERATURE: 98.5 F | HEART RATE: 76 BPM | HEIGHT: 67 IN | WEIGHT: 256 LBS | DIASTOLIC BLOOD PRESSURE: 74 MMHG | SYSTOLIC BLOOD PRESSURE: 126 MMHG | BODY MASS INDEX: 40.18 KG/M2

## 2021-05-16 VITALS
RESPIRATION RATE: 16 BRPM | BODY MASS INDEX: 40.81 KG/M2 | HEIGHT: 67 IN | TEMPERATURE: 98.1 F | DIASTOLIC BLOOD PRESSURE: 58 MMHG | WEIGHT: 260 LBS | HEART RATE: 68 BPM | SYSTOLIC BLOOD PRESSURE: 98 MMHG | OXYGEN SATURATION: 98 %

## 2021-05-16 VITALS
DIASTOLIC BLOOD PRESSURE: 56 MMHG | SYSTOLIC BLOOD PRESSURE: 130 MMHG | HEART RATE: 81 BPM | HEIGHT: 67 IN | WEIGHT: 258 LBS | TEMPERATURE: 96.8 F | BODY MASS INDEX: 40.49 KG/M2

## 2021-05-16 VITALS
SYSTOLIC BLOOD PRESSURE: 132 MMHG | OXYGEN SATURATION: 95 % | HEIGHT: 67 IN | RESPIRATION RATE: 16 BRPM | DIASTOLIC BLOOD PRESSURE: 78 MMHG | TEMPERATURE: 95.8 F | WEIGHT: 255.37 LBS | BODY MASS INDEX: 40.08 KG/M2 | HEART RATE: 86 BPM

## 2021-05-16 VITALS
SYSTOLIC BLOOD PRESSURE: 132 MMHG | BODY MASS INDEX: 39.89 KG/M2 | HEIGHT: 67 IN | WEIGHT: 254.12 LBS | DIASTOLIC BLOOD PRESSURE: 84 MMHG

## 2021-05-16 VITALS — SYSTOLIC BLOOD PRESSURE: 120 MMHG | DIASTOLIC BLOOD PRESSURE: 76 MMHG

## 2021-05-28 ENCOUNTER — OFFICE VISIT CONVERTED (OUTPATIENT)
Dept: OTOLARYNGOLOGY | Facility: CLINIC | Age: 71
End: 2021-05-28
Attending: OTOLARYNGOLOGY

## 2021-06-05 NOTE — H&P
"   History and Physical      Patient Name: Sowmya Hodges   Patient ID: [id number]   Sex: Female   YOB: 1950    Primary Care Provider: Romi Rubio MD   Referring Provider: Blaze Acosta    Visit Date: May 28, 2021    Provider: Ricky Nguyen MD   Location: [facility]   Location Address: [address] [place] [address], [address] [place] [address]  [id number]   Location Phone: [phone]          Chief Complaint     1.  Right eye pain, intermittent    2.  CT sinus with 7 mm left maxillary polyp       History Of Present Illness  Sowmya Hodges is a 70 year old /White female who presents to the office today as a consult from Blaze Acosta.      She presents the clinic today for evaluation of intermittent issues with right upper eyelid pain that she has had on and off for the last month.  She was seen by her ophthalmologist for this, and notes to me that there was some kind of a \"pimple-like lesion\" along the upper lateral palpebral conjunctiva.  She notes that the lesion is now gone, and she is not having pain today.  She has some other issues with floaters in her vision which Dr. Acosta is assessing her for.  As part of the evaluation for her right eye pain CT scan of the orbits was obtained and I reviewed the imaging and results with her today.  This shows a 7 mm polyp in the anterior left maxillary sinus which is nonobstructing, and there is no evidence of sinusitis or surrounding inflammation.  The rest of the scan was essentially normal.  I reviewed the results with her today and reassured her.       Past Medical History  Anemia; Anxiety; Arthritis; Constipation; Depression; Diabetes; Diabetes mellitus; Diverticulitis; Epilepsy; GERD (gastroesophageal reflux disease); Heart Disease; HTN (hypertension); Hyperlipidemia; Limb Swelling; Lower abdominal pain; TAYLOR (obstructive sleep apnea); Polyp, sinus maxillary; Renal cell adenocarcinoma, right; Right Distal Fibula Fracture; " Screening for breast cancer; Seasonal allergies; Shortness of Breath         Past Surgical History  Cholecystectomy; Colonoscopy; EGD; Eye Implant; Heart Valves; Joint Surgery; Knee replacement, right; Nephrectomy, Patial Right; Open Heart Surgery; Salivary Gland Surgery; Tonsilectomy; Wrist         Medication List  Accu-Chek Sabrina Plus test strp miscellaneous strip; Blood Glucose Test miscellaneous strip; buspirone 15 mg oral tablet; dicyclomine 10 mg oral capsule; escitalopram oxalate 20 mg oral tablet; Farxiga 10 mg oral tablet; Flonase Allergy Relief 50 mcg/actuation nasal spray,suspension; gemfibrozil 600 mg oral tablet; Klonopin 1 mg oral tablet; Lancets,Ultra Thin 26 gauge miscellaneous misc; levetiracetam 750 mg oral tablet; levocetirizine 5 mg oral tablet; Lexapro 20 mg oral tablet; lisinopril 2.5 mg oral tablet; Ozempic 1 mg/dose (2 mg/1.5 mL) subcutaneous pen injector; Percocet 7.5-325 mg oral tablet; Polytrim 10,000 unit- 1 mg/mL ophthalmic (eye) drops; Protonix 40 mg oral tablet,delayed release (DR/EC); Vitamin D3 5,000 unit oral tablet; Xifaxan 550 mg oral tablet         Allergy List  Codiene         Family Medical History  Family history of certain chronic disabling diseases; arthritis; Family history of cancer         Social History  Alcohol (Never); Claustophobic (Unknown); lives alone; Recreational Drug Use (Never); Retired; Single; Tobacco (Former)         Immunizations  Name Date Admin   Influenza 10/24/2018   Influenza 09/21/2017   Prevnar 13 09/21/2017         Review of Systems  · Constitutional  o Denies  o : fever, night sweats, weight loss  · Eyes  o Admits  o : discharge from eye  o Denies  o : impaired vision  · HENT  o Admits  o : *See HPI  · Cardiovascular  o Denies  o : chest pain, irregular heart beats  · Respiratory  o Denies  o : shortness of breath, wheezing, coughing up blood  · Gastrointestinal  o Admits  o : heartburn, reflux  o Denies  o : vomiting  "blood  · Genitourinary  o Denies  o : frequency  · Integument  o Denies  o : rash, skin dryness  · Neurologic  o Denies  o : seizures, loss of balance, loss of consciousness, dizziness  · Endocrine  o Denies  o : cold intolerance, heat intolerance  · Heme-Lymph  o Denies  o : easy bleeding, anemia      Vitals  Date Time BP Position Site L\R Cuff Size HR RR TEMP (F) WT  HT  BMI kg/m2 BSA m2 O2 Sat FR L/min FiO2        05/28/2021 08:55 AM        96.9 253lbs 4oz 5'  7\" 39.66 2.33             Physical Examination  · Constitutional  o Appearance  o : well developed, well-nourished, alert and in no acute distress, voice clear and strong  · Head and Face  o Head  o :   § Inspection  § : no deformities or lesions  o Face  o :   § Inspection  § : No facial lesions; House-Brackmann I/VI bilaterally  § Palpation  § : No TMJ crepitus nor  muscle tenderness bilaterally  · Eyes  o Vision  o :   § Visual Fields  § : Extraocular movements are intact. No spontaneous or gaze-induced nystagmus.  o Conjunctivae  o : clear  o Sclerae  o : clear  o Pupils and Irises  o : pupils equal, round, and reactive to light.   · Ears, Nose, Mouth and Throat  o Ears  o :   § External Ears  § : appearance within normal limits, no lesions present  § Otoscopic Examination  § : tympanic membrane appearance within normal limits bilaterally without perforations, well-aerated middle ears  § Hearing  § : intact to conversational voice both ears  o Nose  o :   § External Nose  § : appearance normal  § Intranasal Exam  § : mucosa within normal limits, vestibules normal, no intranasal lesions present, septum midline, sinuses non tender to percussion  o Oral Cavity  o :   § Oral Mucosa  § : oral mucosa normal without pallor or cyanosis  § Lips  § : lip appearance normal  § Teeth  § : normal dentition for age  § Gums  § : gums pink, non-swollen, no bleeding present  § Tongue  § : tongue appearance normal; normal mobility  § Palate  § : hard palate " "normal, soft palate appearance normal with symmetric mobility  o Throat  o :   § Oropharynx  § : no inflammation or lesions present, tonsils within normal limits  § Hypopharynx  § : appearance within normal limits, superior epiglottis within normal limits  § Larynx  § : appearance within normal limits, vocal cords within normal limits, no lesions present  · Neck  o Inspection/Palpation  o : normal appearance, no masses or tenderness, trachea midline; thyroid size normal, nontender, no nodules or masses present on palpation  · Respiratory  o Respiratory Effort  o : breathing unlabored  o Inspection of Chest  o : normal appearance, no retractions  · Cardiovascular  o Heart  o : regular rate and rhythm  · Lymphatic  o Neck  o : no lymphadenopathy present  o Supraclavicular Nodes  o : no lymphadenopathy present  o Preauricular Nodes  o : no lymphadenopathy present  · Skin and Subcutaneous Tissue  o General Inspection  o : Regarding face and neck - there are no rashes present, no lesions present, and no areas of discoloration  · Neurologic  o Cranial Nerves  o : cranial nerves II-XII are grossly intact bilaterally  o Gait and Station  o : normal gait, able to stand without diffculty  · Psychiatric  o Judgement and Insight  o : judgment and insight intact  o Mood and Affect  o : mood normal, affect appropriate          Assessment  · Maxillary sinus polyp     471.8/J33.8      Plan  · Medications  o Medications have been Reconciled  o Transition of Care or Provider Policy  · Instructions  o She presents the clinic today for evaluation of intermittent issues with right upper eyelid pain that she has had on and off for the last month. She was seen by her ophthalmologist for this, and notes to me that there was some kind of a \"pimple-like lesion\" along the upper lateral palpebral conjunctiva. She notes that the lesion is now gone, and she is not having pain today. She has some other issues with floaters in her vision which  " Dave is assessing her for. As part of the evaluation for her right eye pain CT scan of the orbits was obtained and I reviewed the imaging and results with her today. This shows a 7 mm polyp in the anterior left maxillary sinus which is nonobstructing, and there is no evidence of sinusitis or surrounding inflammation. The rest of the scan was essentially normal. I reviewed the results with her today and reassured her. Examination today revealed completely normal appearing palpebral conjunctiva. The nasal cavity shows no evidence of obstruction or inflammatory appearance to the mucosa. I recommended no further intervention for this unless there is any worsening, in which case have asked her to contact me.  o Electronically Identified Patient Education Materials Provided Electronically  · Correspondence  o ENT Letter to Referring MD (Blaze Acosta) - 05/28/2021            Electronically Signed by: Ricky Nguyen MD -Author on May 28, 2021 09:26:37 AM

## 2021-06-09 DIAGNOSIS — F41.9 ANXIETY: Primary | ICD-10-CM

## 2021-06-09 RX ORDER — SEMAGLUTIDE 1.34 MG/ML
INJECTION, SOLUTION SUBCUTANEOUS
Qty: 1.5 ML | Refills: 0 | Status: SHIPPED | OUTPATIENT
Start: 2021-06-09 | End: 2021-06-10 | Stop reason: SDUPTHER

## 2021-06-09 RX ORDER — CLONAZEPAM 1 MG/1
1 TABLET ORAL 2 TIMES DAILY
COMMUNITY
Start: 2021-05-12 | End: 2021-06-09 | Stop reason: SDUPTHER

## 2021-06-09 RX ORDER — CLONAZEPAM 1 MG/1
1 TABLET ORAL NIGHTLY PRN
Qty: 30 TABLET | Refills: 0 | Status: SHIPPED | OUTPATIENT
Start: 2021-06-09 | End: 2021-07-02 | Stop reason: SDUPTHER

## 2021-06-09 RX ORDER — CLONAZEPAM 1 MG/1
1 TABLET ORAL 2 TIMES DAILY
Qty: 60 TABLET | Refills: 0 | OUTPATIENT
Start: 2021-06-09

## 2021-06-09 NOTE — TELEPHONE ENCOUNTER
Last DM labs: 10/6/2020  LOV: 10/6/2020  NOV: none    Patient was advised to make appt and get labs on last fill.

## 2021-06-09 NOTE — TELEPHONE ENCOUNTER
Discussed this with patient, has appointment for 7/2/21. She has some left, but not enough to get her until then.

## 2021-06-09 NOTE — TELEPHONE ENCOUNTER
She needs to be seen before this can be refilled. She last filled this on 5/12/21. She has not been seen in over 6 months.

## 2021-06-09 NOTE — TELEPHONE ENCOUNTER
Patient called for Klonipin refill    Last UDS: 4/5/21  Last Consent: 4/6/21  Last seen: 10/16/2020

## 2021-06-10 RX ORDER — LISINOPRIL 2.5 MG/1
TABLET ORAL
COMMUNITY
Start: 2021-05-18 | End: 2021-06-10 | Stop reason: SDUPTHER

## 2021-06-10 NOTE — TELEPHONE ENCOUNTER
I will send a refill, but she will need to keep her 7/2 appointment to receive any further refills.

## 2021-06-10 NOTE — TELEPHONE ENCOUNTER
Discussed this with the patient and instructed her to keep appointment to receive any further refills.

## 2021-06-11 RX ORDER — GEMFIBROZIL 600 MG/1
TABLET, FILM COATED ORAL
Qty: 60 TABLET | Refills: 0 | Status: SHIPPED | OUTPATIENT
Start: 2021-06-11 | End: 2021-07-06

## 2021-06-11 RX ORDER — LISINOPRIL 2.5 MG/1
2.5 TABLET ORAL DAILY
Qty: 30 TABLET | Refills: 0 | Status: SHIPPED | OUTPATIENT
Start: 2021-06-11 | End: 2021-07-06

## 2021-06-11 RX ORDER — SEMAGLUTIDE 1.34 MG/ML
1 INJECTION, SOLUTION SUBCUTANEOUS WEEKLY
Qty: 2 PEN | Refills: 0 | Status: SHIPPED | OUTPATIENT
Start: 2021-06-11 | End: 2021-07-02

## 2021-06-14 RX ORDER — DAPAGLIFLOZIN 10 MG/1
TABLET, FILM COATED ORAL
Qty: 30 TABLET | Refills: 0 | Status: SHIPPED | OUTPATIENT
Start: 2021-06-14 | End: 2021-07-16

## 2021-06-16 ENCOUNTER — TELEPHONE (OUTPATIENT)
Dept: INTERNAL MEDICINE | Facility: CLINIC | Age: 71
End: 2021-06-16

## 2021-06-16 RX ORDER — PANTOPRAZOLE SODIUM 40 MG/1
TABLET, DELAYED RELEASE ORAL
Qty: 90 TABLET | OUTPATIENT
Start: 2021-06-16

## 2021-06-16 NOTE — TELEPHONE ENCOUNTER
Called to let patient know both meds have been sent to the pharmacy and should be there for pickup.

## 2021-06-16 NOTE — TELEPHONE ENCOUNTER
Caller: Sowmya Hodges    Relationship to patient: Self    Best call back number: [phone]    Patient is needing: PATIENT CALLED TO RECEIVE AN UPDATE ABOUT HER    clonazePAM (KlonoPIN) 1 MG tablet      PRESCRIPTION. WOULD LIKE A CALL BACK TO KNOW IF THE PRESCRIPTION HAS BEEN REFILLED.

## 2021-07-02 ENCOUNTER — OFFICE VISIT (OUTPATIENT)
Dept: INTERNAL MEDICINE | Facility: CLINIC | Age: 71
End: 2021-07-02

## 2021-07-02 VITALS
SYSTOLIC BLOOD PRESSURE: 112 MMHG | WEIGHT: 246.25 LBS | TEMPERATURE: 97.2 F | HEART RATE: 82 BPM | OXYGEN SATURATION: 96 % | HEIGHT: 67 IN | BODY MASS INDEX: 38.65 KG/M2 | DIASTOLIC BLOOD PRESSURE: 62 MMHG

## 2021-07-02 DIAGNOSIS — F41.9 ANXIETY: ICD-10-CM

## 2021-07-02 DIAGNOSIS — Z51.81 THERAPEUTIC DRUG MONITORING: ICD-10-CM

## 2021-07-02 DIAGNOSIS — E78.2 MIXED HYPERLIPIDEMIA: Primary | ICD-10-CM

## 2021-07-02 DIAGNOSIS — E11.9 TYPE 2 DIABETES MELLITUS WITHOUT COMPLICATION, WITHOUT LONG-TERM CURRENT USE OF INSULIN (HCC): ICD-10-CM

## 2021-07-02 LAB
AMPHET+METHAMPHET UR QL: NEGATIVE
AMPHETAMINE INTERNAL CONTROL: NORMAL
AMPHETAMINES UR QL: NEGATIVE
BARBITURATE INTERNAL CONTROL: NORMAL
BARBITURATES UR QL SCN: NEGATIVE
BASOPHILS # BLD AUTO: 0.03 10*3/MM3 (ref 0–0.2)
BASOPHILS NFR BLD AUTO: 0.6 % (ref 0–1.5)
BENZODIAZ UR QL SCN: NEGATIVE
BENZODIAZEPINE INTERNAL CONTROL: NORMAL
BUPRENORPHINE INTERNAL CONTROL: NORMAL
BUPRENORPHINE SERPL-MCNC: NEGATIVE NG/ML
CANNABINOIDS SERPL QL: NEGATIVE
COCAINE INTERNAL CONTROL: NORMAL
COCAINE UR QL: NEGATIVE
DEPRECATED RDW RBC AUTO: 42.1 FL (ref 37–54)
EOSINOPHIL # BLD AUTO: 0.05 10*3/MM3 (ref 0–0.4)
EOSINOPHIL NFR BLD AUTO: 1 % (ref 0.3–6.2)
ERYTHROCYTE [DISTWIDTH] IN BLOOD BY AUTOMATED COUNT: 13.2 % (ref 12.3–15.4)
EXPIRATION DATE: NORMAL
HBA1C MFR BLD: 7.1 % (ref 4.8–5.6)
HCT VFR BLD AUTO: 40.6 % (ref 34–46.6)
HGB BLD-MCNC: 13.5 G/DL (ref 12–15.9)
IMM GRANULOCYTES # BLD AUTO: 0.04 10*3/MM3 (ref 0–0.05)
IMM GRANULOCYTES NFR BLD AUTO: 0.8 % (ref 0–0.5)
LYMPHOCYTES # BLD AUTO: 1.61 10*3/MM3 (ref 0.7–3.1)
LYMPHOCYTES NFR BLD AUTO: 30.7 % (ref 19.6–45.3)
Lab: NORMAL
MCH RBC QN AUTO: 29.4 PG (ref 26.6–33)
MCHC RBC AUTO-ENTMCNC: 33.3 G/DL (ref 31.5–35.7)
MCV RBC AUTO: 88.5 FL (ref 79–97)
MDMA (ECSTASY) INTERNAL CONTROL: NORMAL
MDMA UR QL SCN: NEGATIVE
METHADONE INTERNAL CONTROL: NORMAL
METHADONE UR QL SCN: NEGATIVE
METHAMPHETAMINE INTERNAL CONTROL: NORMAL
MONOCYTES # BLD AUTO: 0.35 10*3/MM3 (ref 0.1–0.9)
MONOCYTES NFR BLD AUTO: 6.7 % (ref 5–12)
NEUTROPHILS NFR BLD AUTO: 3.16 10*3/MM3 (ref 1.7–7)
NEUTROPHILS NFR BLD AUTO: 60.2 % (ref 42.7–76)
NRBC BLD AUTO-RTO: 0 /100 WBC (ref 0–0.2)
OPIATES INTERNAL CONTROL: NORMAL
OPIATES UR QL: NEGATIVE
OXYCODONE INTERNAL CONTROL: NORMAL
OXYCODONE UR QL SCN: NEGATIVE
PCP UR QL SCN: NEGATIVE
PHENCYCLIDINE INTERNAL CONTROL: NORMAL
PLATELET # BLD AUTO: 325 10*3/MM3 (ref 140–450)
PMV BLD AUTO: 9.3 FL (ref 6–12)
RBC # BLD AUTO: 4.59 10*6/MM3 (ref 3.77–5.28)
THC INTERNAL CONTROL: NORMAL
WBC # BLD AUTO: 5.24 10*3/MM3 (ref 3.4–10.8)

## 2021-07-02 PROCEDURE — 85025 COMPLETE CBC W/AUTO DIFF WBC: CPT | Performed by: INTERNAL MEDICINE

## 2021-07-02 PROCEDURE — 99213 OFFICE O/P EST LOW 20 MIN: CPT | Performed by: INTERNAL MEDICINE

## 2021-07-02 PROCEDURE — 80053 COMPREHEN METABOLIC PANEL: CPT | Performed by: INTERNAL MEDICINE

## 2021-07-02 PROCEDURE — 80061 LIPID PANEL: CPT | Performed by: INTERNAL MEDICINE

## 2021-07-02 PROCEDURE — 36415 COLL VENOUS BLD VENIPUNCTURE: CPT | Performed by: INTERNAL MEDICINE

## 2021-07-02 PROCEDURE — 80305 DRUG TEST PRSMV DIR OPT OBS: CPT | Performed by: INTERNAL MEDICINE

## 2021-07-02 PROCEDURE — 84443 ASSAY THYROID STIM HORMONE: CPT | Performed by: INTERNAL MEDICINE

## 2021-07-02 PROCEDURE — 83036 HEMOGLOBIN GLYCOSYLATED A1C: CPT | Performed by: INTERNAL MEDICINE

## 2021-07-02 RX ORDER — BUSPIRONE HYDROCHLORIDE 15 MG/1
15 TABLET ORAL 3 TIMES DAILY PRN
Qty: 90 TABLET | Refills: 2 | Status: SHIPPED | OUTPATIENT
Start: 2021-07-02 | End: 2022-02-22 | Stop reason: SDUPTHER

## 2021-07-02 RX ORDER — BUSPIRONE HYDROCHLORIDE 15 MG/1
15 TABLET ORAL DAILY
COMMUNITY
End: 2021-07-02 | Stop reason: SDUPTHER

## 2021-07-02 RX ORDER — SEMAGLUTIDE 1.34 MG/ML
INJECTION, SOLUTION SUBCUTANEOUS
Qty: 2 PEN | Refills: 2 | Status: SHIPPED | OUTPATIENT
Start: 2021-07-02 | End: 2021-11-10

## 2021-07-02 RX ORDER — FLUTICASONE PROPIONATE 50 MCG
2 SPRAY, SUSPENSION (ML) NASAL DAILY
COMMUNITY
Start: 2021-03-12 | End: 2021-09-22

## 2021-07-02 RX ORDER — CLONAZEPAM 1 MG/1
1 TABLET ORAL 2 TIMES DAILY PRN
Qty: 60 TABLET | Refills: 0 | Status: SHIPPED | OUTPATIENT
Start: 2021-07-02 | End: 2021-07-15 | Stop reason: SDUPTHER

## 2021-07-02 RX ORDER — BLOOD-GLUCOSE METER
1 EACH MISCELLANEOUS TAKE AS DIRECTED
Qty: 1 KIT | Refills: 0 | Status: SHIPPED | OUTPATIENT
Start: 2021-07-02 | End: 2021-07-07 | Stop reason: SDUPTHER

## 2021-07-02 RX ORDER — ESCITALOPRAM OXALATE 20 MG/1
20 TABLET ORAL DAILY
COMMUNITY
End: 2021-09-22

## 2021-07-02 RX ORDER — BLOOD-GLUCOSE METER
EACH MISCELLANEOUS
COMMUNITY
End: 2021-07-02 | Stop reason: SDUPTHER

## 2021-07-02 RX ORDER — LIDOCAINE 50 MG/G
1 PATCH TOPICAL DAILY
COMMUNITY

## 2021-07-02 RX ORDER — LEVETIRACETAM 750 MG/1
750 TABLET ORAL DAILY
COMMUNITY
End: 2022-01-03

## 2021-07-03 LAB
ALBUMIN SERPL-MCNC: 4.6 G/DL (ref 3.5–5.2)
ALBUMIN/GLOB SERPL: 1.5 G/DL
ALP SERPL-CCNC: 81 U/L (ref 39–117)
ALT SERPL W P-5'-P-CCNC: 35 U/L (ref 1–33)
ANION GAP SERPL CALCULATED.3IONS-SCNC: 9.7 MMOL/L (ref 5–15)
AST SERPL-CCNC: 30 U/L (ref 1–32)
BILIRUB SERPL-MCNC: 0.4 MG/DL (ref 0–1.2)
BUN SERPL-MCNC: 12 MG/DL (ref 8–23)
BUN/CREAT SERPL: 13.6 (ref 7–25)
CALCIUM SPEC-SCNC: 9.5 MG/DL (ref 8.6–10.5)
CHLORIDE SERPL-SCNC: 105 MMOL/L (ref 98–107)
CHOLEST SERPL-MCNC: 200 MG/DL (ref 0–200)
CO2 SERPL-SCNC: 24.3 MMOL/L (ref 22–29)
CREAT SERPL-MCNC: 0.88 MG/DL (ref 0.57–1)
GFR SERPL CREATININE-BSD FRML MDRD: 64 ML/MIN/1.73
GLOBULIN UR ELPH-MCNC: 3 GM/DL
GLUCOSE SERPL-MCNC: 123 MG/DL (ref 65–99)
HDLC SERPL-MCNC: 43 MG/DL (ref 40–60)
LDLC SERPL CALC-MCNC: 134 MG/DL (ref 0–100)
LDLC/HDLC SERPL: 3.05 {RATIO}
POTASSIUM SERPL-SCNC: 4.4 MMOL/L (ref 3.5–5.2)
PROT SERPL-MCNC: 7.6 G/DL (ref 6–8.5)
SODIUM SERPL-SCNC: 139 MMOL/L (ref 136–145)
TRIGL SERPL-MCNC: 129 MG/DL (ref 0–150)
TSH SERPL DL<=0.05 MIU/L-ACNC: 0.75 UIU/ML (ref 0.27–4.2)
VLDLC SERPL-MCNC: 23 MG/DL (ref 5–40)

## 2021-07-06 ENCOUNTER — TELEPHONE (OUTPATIENT)
Dept: INTERNAL MEDICINE | Facility: CLINIC | Age: 71
End: 2021-07-06

## 2021-07-06 RX ORDER — PANTOPRAZOLE SODIUM 40 MG/1
TABLET, DELAYED RELEASE ORAL
Qty: 90 TABLET | OUTPATIENT
Start: 2021-07-06

## 2021-07-06 NOTE — TELEPHONE ENCOUNTER
"    Caller: Sowmya Hodges    Relationship: Self    Best call back number: [phone]     What is the best time to reach you: ANYTIME    Who are you requesting to speak with (clinical staff, provider,  specific staff member): CLINICAL     What was the call regarding: PATIENT WANTED TO UPDATE THAT FOR THE ACCU-CHEK METER, HER DIAGNOSIS IS NEEDED BEFORE SENDING TO INSURANCE.\"    Do you require a callback: YES          "

## 2021-07-07 NOTE — TELEPHONE ENCOUNTER
Dx on patients chart of E11.9 pleae resend meter. Thank you    Patient is also needing refill on protonix, xyzal, and gemfibrozil

## 2021-07-08 RX ORDER — LISINOPRIL 2.5 MG/1
TABLET ORAL
Qty: 90 TABLET | Refills: 1 | Status: SHIPPED | OUTPATIENT
Start: 2021-07-08 | End: 2021-12-04

## 2021-07-08 RX ORDER — LEVOCETIRIZINE DIHYDROCHLORIDE 5 MG/1
5 TABLET, FILM COATED ORAL EVERY EVENING
Qty: 90 TABLET | Refills: 1 | Status: SHIPPED | OUTPATIENT
Start: 2021-07-08 | End: 2022-04-04

## 2021-07-08 RX ORDER — BLOOD-GLUCOSE METER
1 EACH MISCELLANEOUS TAKE AS DIRECTED
Qty: 1 KIT | Refills: 0 | Status: SHIPPED | OUTPATIENT
Start: 2021-07-08

## 2021-07-08 RX ORDER — GEMFIBROZIL 600 MG/1
600 TABLET, FILM COATED ORAL 2 TIMES DAILY
Qty: 180 TABLET | Refills: 1 | Status: SHIPPED | OUTPATIENT
Start: 2021-07-08 | End: 2022-04-22

## 2021-07-08 RX ORDER — PANTOPRAZOLE SODIUM 40 MG/1
40 TABLET, DELAYED RELEASE ORAL DAILY
Qty: 90 TABLET | Refills: 1 | Status: SHIPPED | OUTPATIENT
Start: 2021-07-08 | End: 2021-12-04

## 2021-07-08 RX ORDER — GEMFIBROZIL 600 MG/1
TABLET, FILM COATED ORAL
Qty: 180 TABLET | Refills: 1 | Status: SHIPPED | OUTPATIENT
Start: 2021-07-08 | End: 2021-07-08 | Stop reason: SDUPTHER

## 2021-07-15 VITALS — BODY MASS INDEX: 39.75 KG/M2 | HEIGHT: 67 IN | WEIGHT: 253.25 LBS | TEMPERATURE: 96.9 F

## 2021-07-15 DIAGNOSIS — F41.9 ANXIETY: ICD-10-CM

## 2021-07-15 NOTE — TELEPHONE ENCOUNTER
Caller: Sowmya Hodges    Relationship: Self    Best call back number: [phone]    Medication needed:   Requested Prescriptions     Pending Prescriptions Disp Refills   • clonazePAM (KlonoPIN) 1 MG tablet 60 tablet 0     Sig: Take 1 tablet by mouth 2 (Two) Times a Day As Needed for Anxiety.       When do you need the refill by: ASAP      Does the patient have less than a 3 day supply:  [x] Yes  [] No    What is the patient's preferred pharmacy: [facility]/PHARMACY #[id number] - [place] - [address] [facility] [phone] [facility] [phone] FX

## 2021-07-16 RX ORDER — DAPAGLIFLOZIN 10 MG/1
TABLET, FILM COATED ORAL
Qty: 30 TABLET | Refills: 0 | Status: SHIPPED | OUTPATIENT
Start: 2021-07-16 | End: 2021-08-16

## 2021-07-16 RX ORDER — CLONAZEPAM 1 MG/1
1 TABLET ORAL 2 TIMES DAILY PRN
Qty: 60 TABLET | Refills: 0 | Status: SHIPPED | OUTPATIENT
Start: 2021-07-16 | End: 2021-10-02 | Stop reason: SDUPTHER

## 2021-07-22 ENCOUNTER — TELEPHONE (OUTPATIENT)
Dept: UROLOGY | Facility: CLINIC | Age: 71
End: 2021-07-22

## 2021-07-22 DIAGNOSIS — C64.1 RENAL CELL ADENOCARCINOMA, RIGHT (HCC): Primary | ICD-10-CM

## 2021-07-22 NOTE — TELEPHONE ENCOUNTER
Patient called and has a annual appt with CT prior on 9-1-21, order needs put in please. Let me know when it is signed so I can let patient know that it was sent to the [facility] please.

## 2021-08-05 ENCOUNTER — TELEPHONE (OUTPATIENT)
Dept: INTERNAL MEDICINE | Facility: CLINIC | Age: 71
End: 2021-08-05

## 2021-08-05 NOTE — TELEPHONE ENCOUNTER
Caller: ADITI    Relationship: [employer] REPRESENTATIVE    Best call back number: [phone]    What medication are you requesting: -STATIN    If a prescription is needed, what is your preferred pharmacy and phone number:    [facility]/pharmacy #[id number] - [place], [place] - [address] - [phone] [facility] [phone] FX  [phone]    Additional notes:  PATIENTS INSURANCE CALLED WANTING TO KNOW IF DR BURRIS WOULD PLACE THE THE PATIENT ON ONE OF THE -STATIN MEDICATIONS AND SPEAK TO THEM ABOUT THE MEDICATION. THEY WILL BE SENDING OVER SOME PAPERWORK FOR THE RECOMMENDATION AS WELL.

## 2021-08-16 RX ORDER — DAPAGLIFLOZIN 10 MG/1
TABLET, FILM COATED ORAL
Qty: 30 TABLET | Refills: 0 | Status: SHIPPED | OUTPATIENT
Start: 2021-08-16 | End: 2021-09-20

## 2021-08-19 ENCOUNTER — OFFICE VISIT (OUTPATIENT)
Dept: INTERNAL MEDICINE | Facility: CLINIC | Age: 71
End: 2021-08-19

## 2021-08-19 VITALS
DIASTOLIC BLOOD PRESSURE: 68 MMHG | WEIGHT: 248.13 LBS | BODY MASS INDEX: 38.94 KG/M2 | OXYGEN SATURATION: 97 % | TEMPERATURE: 97.6 F | HEART RATE: 78 BPM | HEIGHT: 67 IN | SYSTOLIC BLOOD PRESSURE: 124 MMHG

## 2021-08-19 DIAGNOSIS — Z78.0 POSTMENOPAUSE: ICD-10-CM

## 2021-08-19 DIAGNOSIS — Z23 NEED FOR VACCINATION: ICD-10-CM

## 2021-08-19 DIAGNOSIS — Z00.00 ENCOUNTER FOR INITIAL ANNUAL WELLNESS VISIT (AWV) IN MEDICARE PATIENT: Primary | ICD-10-CM

## 2021-08-19 PROCEDURE — 90732 PPSV23 VACC 2 YRS+ SUBQ/IM: CPT | Performed by: INTERNAL MEDICINE

## 2021-08-19 PROCEDURE — G0009 ADMIN PNEUMOCOCCAL VACCINE: HCPCS | Performed by: INTERNAL MEDICINE

## 2021-08-19 PROCEDURE — 96160 PT-FOCUSED HLTH RISK ASSMT: CPT | Performed by: INTERNAL MEDICINE

## 2021-08-19 PROCEDURE — 1170F FXNL STATUS ASSESSED: CPT | Performed by: INTERNAL MEDICINE

## 2021-08-19 PROCEDURE — G0438 PPPS, INITIAL VISIT: HCPCS | Performed by: INTERNAL MEDICINE

## 2021-08-19 PROCEDURE — 1159F MED LIST DOCD IN RCRD: CPT | Performed by: INTERNAL MEDICINE

## 2021-08-19 RX ORDER — HYDROCODONE BITARTRATE AND ACETAMINOPHEN 10; 325 MG/1; MG/1
1 TABLET ORAL AS NEEDED
COMMUNITY
Start: 2021-08-07

## 2021-08-19 NOTE — PATIENT INSTRUCTIONS
Medicare Wellness  Personal Prevention Plan of Service     Date of Office Visit:  2021  Encounter Provider:  Romi Rubio MD  Place of Service:  [facility]  Patient Name: Sowmya Hodges  :  1950    As part of the Medicare Wellness portion of your visit today, we are providing you with this personalized preventive plan of services (PPPS). This plan is based upon recommendations of the [place] Preventive Services Task Force (USPSTF) and the Advisory Committee on Immunization Practices (ACIP).    This lists the preventive care services that should be considered, and provides dates of when you are due. Items listed as completed are up-to-date and do not require any further intervention.    Health Maintenance   Topic Date Due   • TDAP/TD VACCINES (1 - Tdap) Never done   • Pneumococcal Vaccine 65+ (1 of 2 - PPSV23) 2017   • DXA SCAN  2019   • COVID-19 Vaccine (2 - Moderna 2-dose series) 2021   • ANNUAL WELLNESS VISIT  Never done   • INFLUENZA VACCINE  10/01/2021   • DIABETIC FOOT EXAM  10/06/2021   • URINE MICROALBUMIN  10/06/2021   • HEMOGLOBIN A1C  2022   • MAMMOGRAM  2022   • DIABETIC EYE EXAM  2022   • LIPID PANEL  2022   • COLORECTAL CANCER SCREENING  2030   • HEPATITIS C SCREENING  Completed   • ZOSTER VACCINE  Completed       No orders of the defined types were placed in this encounter.      No follow-ups on file.        Advance Care Planning and Advance Directives     You make decisions on a daily basis - decisions about where you want to live, your career, your home, your life. Perhaps one of the most important decisions you face is your choice for future medical care. Take time to talk with your family and your healthcare team and start planning today.  Advance Care Planning is a process that can help you:  · Understand possible future healthcare decisions in light of your own  experiences  · Reflect on those decision in light of your goals and values  · Discuss your decisions with those closest to you and the healthcare professionals that care for you  · Make a plan by creating a document that reflects your wishes    Surrogate Decision Maker  In the event of a medical emergency, which has left you unable to communicate or to make your own decisions, you would need someone to make decisions for you.  It is important to discuss your preferences for medical treatment with this person while you are in good health.     Qualities of a surrogate decision maker:  • Willing to take on this role and responsibility  • Knows what you want for future medical care  • Willing to follow your wishes even if they don't agree with them  • Able to make difficult medical decisions under stressful circumstances    Advance Directives  These are legal documents you can create that will guide your healthcare team and decision maker(s) when needed. These documents can be stored in the electronic medical record.    · Living Will - a legal document to guide your care if you have a terminal condition or a serious illness and are unable to communicate. States vary by statute in document names/types, but most forms may include one or more of the following:        -  Directions regarding life-prolonging treatments        -  Directions regarding artificially provided nutrition/hydration        -  Choosing a healthcare decision maker        -  Direction regarding organ/tissue donation    · Durable Power of  for Healthcare - this document names an -in-fact to make medical decisions for you, but it may also allow this person to make personal and financial decisions for you. Please seek the advice of an  if you need this type of document.    **Advance Directives are not required and no one may discriminate against you if you do not sign one.    Medical Orders  Many states allow specific forms/orders  signed by your physician to record your wishes for medical treatment in your current state of health. This form, signed in personal communication with your physician, addresses resuscitation and other medical interventions that you may or may not want.      For more information or to schedule a time with a [facility] Advance Care Planning Facilitator contact: [email or web address] or call [phone] and someone will contact you directly.

## 2021-08-19 NOTE — PROGRESS NOTES
The ABCs of the Annual Wellness Visit  Initial Medicare Wellness Visit    Chief Complaint   Patient presents with   • Medicare Wellness-Initial Visit       Subjective   History of Present Illness:  Sowmya Hodges is a 71 y.o. female who presents for an Initial Medicare Wellness Visit.    HEALTH RISK ASSESSMENT    Recent Hospitalizations:  No hospitalization(s) within the last year.    Current Medical Providers:  Patient Care Team:  Romi Rubio MD as PCP - General (Pediatrics)    Smoking Status:  Social History     Tobacco Use   Smoking Status Former Smoker   • Packs/day: 1.50   • Years: 4.00   • Pack years: 6.00   Smokeless Tobacco Never Used       Alcohol Consumption:  Social History     Substance and Sexual Activity   Alcohol Use Never    Comment: drinks less than 1 drink per day        Depression Screen:   PHQ-2/PHQ-9 Depression Screening 8/19/2021   Little interest or pleasure in doing things 1   Feeling down, depressed, or hopeless 1   Trouble falling or staying asleep, or sleeping too much 0   Feeling tired or having little energy 1   Poor appetite or overeating 2   Feeling bad about yourself - or that you are a failure or have let yourself or your family down 0   Trouble concentrating on things, such as reading the newspaper or watching television 0   Moving or speaking so slowly that other people could have noticed. Or the opposite - being so fidgety or restless that you have been moving around a lot more than usual 0   Thoughts that you would be better off dead, or of hurting yourself in some way 0   Total Score 5   If you checked off any problems, how difficult have these problems made it for you to do your work, take care of things at home, or get along with other people? Not difficult at all       Fall Risk Screen:  STEADI Fall Risk Assessment was completed, and patient is at MODERATE risk for falls. Assessment completed on:8/19/2021    Health Habits and Functional and Cognitive  Screening:  Functional & Cognitive Status 8/19/2021   Do you have difficulty preparing food and eating? No   Do you have difficulty bathing yourself, getting dressed or grooming yourself? No   Do you have difficulty using the toilet? No   Do you have difficulty moving around from place to place? No   Do you have trouble with steps or getting out of a bed or a chair? Yes   Current Diet Well Balanced Diet   Dental Exam Up to date   Eye Exam Up to date   Exercise (times per week) 2 times per week   Current Exercises Include Walking   Do you need help using the phone?  No   Are you deaf or do you have serious difficulty hearing?  Yes   Do you need help with transportation? No   Do you need help shopping? No   Do you need help preparing meals?  No   Do you need help with housework?  No   Do you need help with laundry? No   Do you need help taking your medications? No   Do you need help managing money? No   Do you ever drive or ride in a car without wearing a seat belt? No   Have you felt unusual stress, anger or loneliness in the last month? Yes   Who do you live with? Alone   If you need help, do you have trouble finding someone available to you? No   Have you been bothered in the last four weeks by sexual problems? No   Do you have difficulty concentrating, remembering or making decisions? Yes         Does the patient have evidence of cognitive impairment? No    Asprin use counseling:Does not need ASA (and currently is not on it)    Age-appropriate Screening Schedule:  Refer to the list below for future screening recommendations based on patient's age, sex and/or medical conditions. Orders for these recommended tests are listed in the plan section. The patient has been provided with a written plan.    Health Maintenance   Topic Date Due   • TDAP/TD VACCINES (1 - Tdap) Never done   • DXA SCAN  03/13/2019   • INFLUENZA VACCINE  10/01/2021   • DIABETIC FOOT EXAM  10/06/2021   • URINE MICROALBUMIN  10/06/2021   •  HEMOGLOBIN A1C  01/02/2022   • MAMMOGRAM  01/07/2022   • DIABETIC EYE EXAM  04/27/2022   • LIPID PANEL  07/02/2022   • ZOSTER VACCINE  Completed          The following portions of the patient's history were reviewed and updated as appropriate: allergies, current medications, past family history, past medical history, past social history, past surgical history and problem list.    Outpatient Medications Prior to Visit   Medication Sig Dispense Refill   • Blood Glucose Monitoring Suppl (Accu-Chek Sabrina Plus) w/Device kit 1 kit Take As Directed. To checke blood sugar up to 3 times a day. DX E11.9 1 kit 0   • busPIRone (BUSPAR) 15 MG tablet Take 1 tablet by mouth 3 (Three) Times a Day As Needed (Anxiety). 90 tablet 2   • Cholecalciferol 125 MCG (5000 UT) tablet Take 5,000 Units by mouth Daily.     • clonazePAM (KlonoPIN) 1 MG tablet Take 1 tablet by mouth 2 (Two) Times a Day As Needed for Anxiety. 60 tablet 0   • escitalopram (LEXAPRO) 20 MG tablet Take 20 mg by mouth Daily.     • Farxiga 10 MG tablet TAKE 1 TABLET (10 MG) BY ORAL ROUTE ONCE DAILY IN THE MORNING -NEEDS APPOINTMENT AND LABS 30 tablet 0   • fluticasone (Flonase) 50 MCG/ACT nasal spray 2 sprays into the nostril(s) as directed by provider Daily.     • gemfibrozil (LOPID) 600 MG tablet Take 1 tablet by mouth 2 (Two) Times a Day. 180 tablet 1   • levETIRAcetam (KEPPRA) 750 MG tablet Take 750 mg by mouth Daily.     • levocetirizine (Xyzal) 5 MG tablet Take 1 tablet by mouth Every Evening. 90 tablet 1   • lidocaine (LIDODERM) 5 % Place 1 patch on the skin as directed by provider Daily.     • lisinopril (PRINIVIL,ZESTRIL) 2.5 MG tablet TAKE 1 TABLET BY MOUTH EVERY DAY 90 tablet 1   • pantoprazole (Protonix) 40 MG EC tablet Take 1 tablet by mouth Daily. 90 tablet 1   • Semaglutide, 1 MG/DOSE, (Ozempic, 1 MG/DOSE,) 2 MG/1.5ML solution pen-injector Inject 1 mg under the skin once weekly 2 pen 2   • HYDROcodone-acetaminophen (NORCO)  MG per tablet Take 1  "tablet by mouth As Needed.       No facility-administered medications prior to visit.       Patient Active Problem List   Diagnosis   • Diabetes (CMS/[facility])   • Depression   • GERD (gastroesophageal reflux disease)   • Hyperlipidemia       Advanced Care Planning:  ACP discussion was held with the patient during this visit. Patient has an advance directive in EMR which is still valid.     Review of Systems    Compared to one year ago, the patient feels her physical health is the same.  Compared to one year ago, the patient feels her mental health is the same.    Reviewed chart for potential of high risk medication in the elderly: yes  Reviewed chart for potential of harmful drug interactions in the elderly:yes    Objective         Vitals:    08/19/21 0837   BP: 124/68   Pulse: 78   Temp: 97.6 °F (36.4 °C)   TempSrc: Temporal   SpO2: 97%   Weight: 113 kg (248 lb 2 oz)   Height: 170.2 cm (67\")       Body mass index is 38.86 kg/m².  Discussed the patient's BMI with her. The BMI is in the acceptable range.    Physical Exam    Lab Results   Component Value Date    TRIG 129 07/02/2021    HDL 43 07/02/2021     (H) 07/02/2021    VLDL 23 07/02/2021    HGBA1C 7.10 (H) 07/02/2021        Assessment/Plan   Medicare Risks and Personalized Health Plan  CMS Preventative Services Quick Reference  Advance Directive Discussion  Breast Cancer/Mammogram Screening  Immunizations Discussed/Encouraged (specific immunizations; Tdap, Pneumococcal 23, Shingrix and COVID19 )  Osteoporosis Risk    The above risks/problems have been discussed with the patient.  Pertinent information has been shared with the patient in the After Visit Summary.  Follow up plans and orders are seen below in the Assessment/Plan Section.    Diagnoses and all orders for this visit:    1. Encounter for initial annual wellness visit (AWV) in Medicare patient (Primary)    2. Postmenopause  -     DEXA Bone Density Axial; Future    3. Need for vaccination  -     " Pneumococcal Polysaccharide Vaccine 23-Valent Greater Than or Equal To 3yo Subcutaneous / IM      Follow Up:  Return in about 6 months (around 2/19/2022) for Next scheduled follow up.     An After Visit Summary and PPPS were given to the patient.

## 2021-08-31 DIAGNOSIS — C64.1 RENAL CELL ADENOCARCINOMA, RIGHT (HCC): ICD-10-CM

## 2021-09-01 ENCOUNTER — OFFICE VISIT (OUTPATIENT)
Dept: UROLOGY | Facility: CLINIC | Age: 71
End: 2021-09-01

## 2021-09-01 VITALS
DIASTOLIC BLOOD PRESSURE: 56 MMHG | SYSTOLIC BLOOD PRESSURE: 115 MMHG | WEIGHT: 248.8 LBS | BODY MASS INDEX: 39.05 KG/M2 | HEIGHT: 67 IN

## 2021-09-01 DIAGNOSIS — C64.1 RENAL CELL ADENOCARCINOMA, RIGHT (HCC): Primary | ICD-10-CM

## 2021-09-01 PROBLEM — D30.01: Chronic | Status: RESOLVED | Noted: 2021-09-01 | Resolved: 2021-09-01

## 2021-09-01 PROBLEM — D30.01: Status: ACTIVE | Noted: 2021-09-01

## 2021-09-01 PROBLEM — D30.01: Chronic | Status: ACTIVE | Noted: 2021-09-01

## 2021-09-01 LAB
BILIRUB BLD-MCNC: NEGATIVE MG/DL
CLARITY, POC: CLEAR
COLOR UR: YELLOW
GLUCOSE UR STRIP-MCNC: ABNORMAL MG/DL
KETONES UR QL: NEGATIVE
LEUKOCYTE EST, POC: NEGATIVE
NITRITE UR-MCNC: NEGATIVE MG/ML
PH UR: 5 [PH] (ref 5–8)
PROT UR STRIP-MCNC: NEGATIVE MG/DL
RBC # UR STRIP: NEGATIVE /UL
SP GR UR: 1.01 (ref 1–1.03)
UROBILINOGEN UR QL: NORMAL

## 2021-09-01 PROCEDURE — 81003 URINALYSIS AUTO W/O SCOPE: CPT | Performed by: UROLOGY

## 2021-09-01 PROCEDURE — 99212 OFFICE O/P EST SF 10 MIN: CPT | Performed by: UROLOGY

## 2021-09-01 NOTE — PROGRESS NOTES
"Chief Complaint  Annual Exam (PARTIAL NEPHRECTOMY)    Subjective          Sowmya Hodges presents to [facility] UR[facility]  The patient is a 71 year old /White female who returns for a routine post-operative follow-up visit after undergoing right partial nephrectomy for right renal cell carcinoma with negative margins, stage T1a lesion on 12/02/2014.     Since the patient's surgery, she complains of no significant symptoms or problems since the surgery.      Her CXR and CT scan were normal in (August 2018).  She has no evidence of recurrence.       She had a CT scan in July 2021 that was negative.  She has no evidence of recurrence.      Objective   Vital Signs:   /56   Ht 170.2 cm (67\")   Wt 113 kg (248 lb 12.8 oz)   BMI 38.97 kg/m²     Physical Exam  Vitals and nursing note reviewed.   Constitutional:       Appearance: Normal appearance. She is well-developed.   Pulmonary:      Effort: Pulmonary effort is normal.      Breath sounds: Normal air entry.   Neurological:      Mental Status: She is alert and oriented to person, place, and time.      Motor: Motor function is intact.   Psychiatric:         Mood and Affect: Mood normal.         Behavior: Behavior normal.        Result Review :                  Results for orders placed or performed in visit on 09/01/21   POC Urinalysis Dipstick, Automated    Specimen: Urine   Result Value Ref Range    Color Yellow Yellow, Straw, Dark Yellow, [place]    Clarity, UA Clear Clear    Specific Gravity  1.015 1.005 - 1.030    pH, Urine 5.0 5.0 - 8.0    Leukocytes Negative Negative    Nitrite, UA Negative Negative    Protein, POC Negative Negative mg/dL    Glucose, UA >=1000 mg/dL (3+) (A) Negative, 1000 mg/dL (3+) mg/dL    Ketones, UA Negative Negative    Urobilinogen, UA Normal Normal    Bilirubin Negative Negative    Blood, UA Negative Negative       Assessment and Plan    Diagnoses and all orders for this visit:    1. Renal cell adenocarcinoma, " right (CMS/HCC) (Primary)  Assessment & Plan:  CT scan and follow up in 18 months. No evidence of disease at this time.     Orders:  -     POC Urinalysis Dipstick, Automated  -     CT Abdomen Pelvis With & Without Contrast; Future      Follow Up   No follow-ups on file.  Patient was given instructions and counseling regarding her condition or for health maintenance advice. Please see specific information pulled into the AVS if appropriate.

## 2021-09-20 RX ORDER — DAPAGLIFLOZIN 10 MG/1
TABLET, FILM COATED ORAL
Qty: 90 TABLET | Refills: 1 | Status: SHIPPED | OUTPATIENT
Start: 2021-09-20 | End: 2022-01-03

## 2021-09-22 RX ORDER — FLUTICASONE PROPIONATE 50 MCG
SPRAY, SUSPENSION (ML) NASAL
Qty: 48 ML | Refills: 1 | Status: SHIPPED | OUTPATIENT
Start: 2021-09-22 | End: 2022-01-18 | Stop reason: SDUPTHER

## 2021-09-22 RX ORDER — ESCITALOPRAM OXALATE 20 MG/1
TABLET ORAL
Qty: 90 TABLET | Refills: 1 | Status: SHIPPED | OUTPATIENT
Start: 2021-09-22 | End: 2022-02-01

## 2021-09-29 ENCOUNTER — HOSPITAL ENCOUNTER (OUTPATIENT)
Dept: MAMMOGRAPHY | Facility: HOSPITAL | Age: 71
Discharge: HOME | End: 2021-09-29
Admitting: INTERNAL MEDICINE

## 2021-09-29 DIAGNOSIS — Z12.31 VISIT FOR SCREENING MAMMOGRAM: ICD-10-CM

## 2021-09-29 PROCEDURE — 77063 BREAST TOMOSYNTHESIS BI: CPT

## 2021-09-29 PROCEDURE — 77067 SCR MAMMO BI INCL CAD: CPT

## 2021-10-01 ENCOUNTER — TELEPHONE (OUTPATIENT)
Dept: INTERNAL MEDICINE | Facility: CLINIC | Age: 71
End: 2021-10-01

## 2021-10-01 DIAGNOSIS — F41.9 ANXIETY: ICD-10-CM

## 2021-10-01 RX ORDER — CLONAZEPAM 1 MG/1
1 TABLET ORAL 2 TIMES DAILY PRN
Qty: 60 TABLET | Refills: 0 | Status: CANCELLED | OUTPATIENT
Start: 2021-10-01

## 2021-10-01 NOTE — TELEPHONE ENCOUNTER
Caller: Sowmya Hodges    Relationship: Self      Medication requested (name and dosage): clonazePAM (KlonoPIN) 1 MG tablet    Pharmacy where request should be sent: [facility]/pharmacy #[id number] - [place], [place] - [address] - [phone] [facility] [phone] FX     Best call back number: [phone]    Does the patient have less than a 3 day supply:  [x] Yes  [] No    Mounika Hadley Rep   10/01/21 11:56 EDT

## 2021-10-01 NOTE — TELEPHONE ENCOUNTER
Refill request for  controlled substance.      Date of request: 10/1/2021    Medication requested: Klonopin (Clonazepam)  Last OV: 8/19/21     Last UDS: 7/2/21  Contract signed: yes    Date:4/6/21  Next office visit: 1/5/22    Arlen Davis

## 2021-10-01 NOTE — TELEPHONE ENCOUNTER
Caller: Sowmya Hodges    Relationship: Self    Medication requested (name and dosage):    clonazePAM (KlonoPIN) 1 MG tablet     Pharmacy where request should be sent: [facility]/pharmacy #[id number] - [place], [place] - [address] - [phone] [facility] [phone] FX    Best call back number: [phone]    Does the patient have less than a 3 day supply:  [x] Yes  [] No    Mounika PROCTOR Rep   10/01/21 09:46 EDT

## 2021-10-02 RX ORDER — CLONAZEPAM 1 MG/1
1 TABLET ORAL 2 TIMES DAILY PRN
Qty: 60 TABLET | Refills: 0 | Status: SHIPPED | OUTPATIENT
Start: 2021-10-02 | End: 2021-11-10 | Stop reason: SDUPTHER

## 2021-11-01 ENCOUNTER — OFFICE VISIT (OUTPATIENT)
Dept: INTERNAL MEDICINE | Facility: CLINIC | Age: 71
End: 2021-11-01

## 2021-11-01 VITALS
SYSTOLIC BLOOD PRESSURE: 120 MMHG | OXYGEN SATURATION: 96 % | BODY MASS INDEX: 56.7 KG/M2 | HEIGHT: 55 IN | RESPIRATION RATE: 16 BRPM | HEART RATE: 81 BPM | TEMPERATURE: 96.4 F | DIASTOLIC BLOOD PRESSURE: 64 MMHG | WEIGHT: 245 LBS

## 2021-11-01 DIAGNOSIS — R42 VERTIGO: Primary | ICD-10-CM

## 2021-11-01 PROCEDURE — 99213 OFFICE O/P EST LOW 20 MIN: CPT | Performed by: NURSE PRACTITIONER

## 2021-11-01 RX ORDER — MECLIZINE HYDROCHLORIDE 25 MG/1
25 TABLET ORAL 3 TIMES DAILY PRN
Qty: 30 TABLET | Refills: 0 | Status: SHIPPED | OUTPATIENT
Start: 2021-11-01 | End: 2021-11-06

## 2021-11-01 NOTE — ASSESSMENT & PLAN NOTE
Patient with vertigo.  She has had this in the past.  She was hoping to get some medicine to help alleviate the symptoms of dizziness.  She has not had any significant nausea or vomiting.  Will try meclizine.  Advised to follow-up if symptoms are persistent or worsening.  Patient agrees to this plan

## 2021-11-01 NOTE — PROGRESS NOTES
"Chief Complaint  Dizziness (last week), Eye Problem (6 months), and Headache    Subjective         Sowmya Hodges presents to [facility] for Evaluation of her dizziness.    Patient presents today with 1 week of intermittent dizziness.  She describes it as vertigo, she has had that in the past.  She was hoping for some medication that would help with her symptoms.  She also reports that she has had an issue with her right, the eye doctor has been working with her to help alleviate this problem.  She is due back in another week for further treatment.  She also reports intermittent headache, she says it is associated with her eye problem.  She has been taking Tylenol as needed for that.         Review of Systems   Constitutional: Negative for chills, fatigue and fever.   HENT: Negative for congestion, sinus pressure, sneezing and sore throat.    Respiratory: Negative for cough, chest tightness and shortness of breath.    Cardiovascular: Negative for chest pain and palpitations.   Gastrointestinal: Negative for diarrhea, nausea and vomiting.   Neurological: Positive for dizziness and headaches. Negative for weakness and numbness.       Objective   Vital Signs:   /64 (BP Location: Left arm, Patient Position: Sitting, Cuff Size: Adult)   Pulse 81   Temp 96.4 °F (35.8 °C) (Temporal)   Resp 16   Ht 67 cm (26.38\")   Wt 111 kg (245 lb)   SpO2 96%   .56 kg/m²     Physical Exam  Vitals and nursing note reviewed.   Constitutional:       Appearance: Normal appearance. She is normal weight.   HENT:      Head: Normocephalic and atraumatic.      Right Ear: Tympanic membrane and ear canal normal.      Left Ear: Tympanic membrane and ear canal normal.      Nose: Nose normal.      Mouth/Throat:      Mouth: Mucous membranes are moist.      Pharynx: Oropharynx is clear.   Eyes:      Extraocular Movements: Extraocular movements intact.      Conjunctiva/sclera: Conjunctivae normal.      " Pupils: Pupils are equal, round, and reactive to light.   Cardiovascular:      Rate and Rhythm: Normal rate and regular rhythm.      Heart sounds: Normal heart sounds.   Pulmonary:      Effort: Pulmonary effort is normal.      Breath sounds: Normal breath sounds.   Neurological:      General: No focal deficit present.      Mental Status: She is alert.   Psychiatric:         Mood and Affect: Mood normal.         Thought Content: Thought content normal.        Result Review :                   Diagnoses and all orders for this visit:    1. Vertigo (Primary)  Assessment & Plan:  Patient with vertigo.  She has had this in the past.  She was hoping to get some medicine to help alleviate the symptoms of dizziness.  She has not had any significant nausea or vomiting.  Will try meclizine.  Advised to follow-up if symptoms are persistent or worsening.  Patient agrees to this plan      Other orders  -     meclizine (ANTIVERT) 25 MG tablet; Take 1 tablet by mouth 3 (Three) Times a Day As Needed for Dizziness for up to 5 days.  Dispense: 30 tablet; Refill: 0        Follow Up   No follow-ups on file.  Patient was given instructions and counseling regarding her condition or for health maintenance advice. Please see specific information pulled into the AVS if appropriate.     Fili Abdi, LORENZO  11/1/2021  This note was electronically signed.

## 2021-11-01 NOTE — PATIENT INSTRUCTIONS
First off, thank you for allowing me to take part of your care today.    For your dizziness, we will try meclizine.  This has been sent to your pharmacy for you to .  Please take it as directed on the bottle.  If you have any questions or concerns please reach out to us.    If your symptoms persist or worsen please follow back up with us as we may need to further evaluate.

## 2021-11-10 DIAGNOSIS — F41.9 ANXIETY: ICD-10-CM

## 2021-11-10 RX ORDER — SEMAGLUTIDE 1.34 MG/ML
1 INJECTION, SOLUTION SUBCUTANEOUS WEEKLY
Qty: 3 PEN | Refills: 1 | Status: SHIPPED | OUTPATIENT
Start: 2021-11-10 | End: 2022-02-08

## 2021-11-10 RX ORDER — CLONAZEPAM 1 MG/1
1 TABLET ORAL 2 TIMES DAILY PRN
Qty: 60 TABLET | Refills: 0 | Status: SHIPPED | OUTPATIENT
Start: 2021-11-10 | End: 2022-01-10 | Stop reason: SDUPTHER

## 2021-11-10 NOTE — TELEPHONE ENCOUNTER
Last OV: 8/19/21                                   Last UDS: 7/2/21  Contract signed: yes    Date:4/6/21  Next office visit: 1/5/22

## 2021-11-11 ENCOUNTER — APPOINTMENT (OUTPATIENT)
Dept: BONE DENSITY | Facility: HOSPITAL | Age: 71
End: 2021-11-11

## 2021-11-17 ENCOUNTER — HOSPITAL ENCOUNTER (OUTPATIENT)
Dept: BONE DENSITY | Facility: HOSPITAL | Age: 71
Discharge: HOME | End: 2021-11-17
Admitting: INTERNAL MEDICINE

## 2021-11-17 DIAGNOSIS — Z78.0 POSTMENOPAUSE: ICD-10-CM

## 2021-11-17 PROCEDURE — 77080 DXA BONE DENSITY AXIAL: CPT

## 2021-12-04 RX ORDER — PANTOPRAZOLE SODIUM 40 MG/1
TABLET, DELAYED RELEASE ORAL
Qty: 90 TABLET | Refills: 1 | Status: SHIPPED | OUTPATIENT
Start: 2021-12-04 | End: 2022-04-04

## 2021-12-04 RX ORDER — LISINOPRIL 2.5 MG/1
TABLET ORAL
Qty: 90 TABLET | Refills: 1 | Status: SHIPPED | OUTPATIENT
Start: 2021-12-04 | End: 2022-01-18 | Stop reason: SDUPTHER

## 2022-01-03 RX ORDER — LEVETIRACETAM 750 MG/1
TABLET ORAL
Qty: 180 TABLET | Refills: 1 | Status: SHIPPED | OUTPATIENT
Start: 2022-01-03 | End: 2022-04-22

## 2022-01-03 RX ORDER — DAPAGLIFLOZIN 10 MG/1
TABLET, FILM COATED ORAL
Qty: 90 TABLET | Refills: 1 | Status: SHIPPED | OUTPATIENT
Start: 2022-01-03 | End: 2022-05-23

## 2022-01-10 ENCOUNTER — OFFICE VISIT (OUTPATIENT)
Dept: INTERNAL MEDICINE | Facility: CLINIC | Age: 72
End: 2022-01-10

## 2022-01-10 VITALS
SYSTOLIC BLOOD PRESSURE: 124 MMHG | RESPIRATION RATE: 18 BRPM | WEIGHT: 242.2 LBS | HEIGHT: 55 IN | BODY MASS INDEX: 56.05 KG/M2 | HEART RATE: 87 BPM | TEMPERATURE: 96.9 F | OXYGEN SATURATION: 96 % | DIASTOLIC BLOOD PRESSURE: 70 MMHG

## 2022-01-10 DIAGNOSIS — F41.9 ANXIETY: ICD-10-CM

## 2022-01-10 DIAGNOSIS — R05.9 COUGH: ICD-10-CM

## 2022-01-10 DIAGNOSIS — Z79.899 MEDICATION MANAGEMENT: ICD-10-CM

## 2022-01-10 DIAGNOSIS — B34.9 VIRAL ILLNESS: Primary | ICD-10-CM

## 2022-01-10 LAB
AMPHET+METHAMPHET UR QL: NEGATIVE
AMPHETAMINE INTERNAL CONTROL: NORMAL
AMPHETAMINES UR QL: NEGATIVE
BARBITURATE INTERNAL CONTROL: NORMAL
BARBITURATES UR QL SCN: NEGATIVE
BENZODIAZ UR QL SCN: NEGATIVE
BENZODIAZEPINE INTERNAL CONTROL: NORMAL
BUPRENORPHINE INTERNAL CONTROL: NORMAL
BUPRENORPHINE SERPL-MCNC: NEGATIVE NG/ML
CANNABINOIDS SERPL QL: NEGATIVE
COCAINE INTERNAL CONTROL: NORMAL
COCAINE UR QL: NEGATIVE
EXPIRATION DATE: ABNORMAL
EXPIRATION DATE: NORMAL
EXPIRATION DATE: NORMAL
FLUAV AG NPH QL: NEGATIVE
FLUBV AG NPH QL: NEGATIVE
INTERNAL CONTROL: ABNORMAL
INTERNAL CONTROL: NORMAL
Lab: ABNORMAL
Lab: NORMAL
Lab: NORMAL
MDMA (ECSTASY) INTERNAL CONTROL: NORMAL
MDMA UR QL SCN: NEGATIVE
METHADONE INTERNAL CONTROL: NORMAL
METHADONE UR QL SCN: NEGATIVE
METHAMPHETAMINE INTERNAL CONTROL: NORMAL
OPIATES INTERNAL CONTROL: NORMAL
OPIATES UR QL: NEGATIVE
OXYCODONE INTERNAL CONTROL: NORMAL
OXYCODONE UR QL SCN: NEGATIVE
PCP UR QL SCN: NEGATIVE
PHENCYCLIDINE INTERNAL CONTROL: NORMAL
SARS-COV-2 AG UPPER RESP QL IA.RAPID: NOT DETECTED
THC INTERNAL CONTROL: NORMAL

## 2022-01-10 PROCEDURE — 87426 SARSCOV CORONAVIRUS AG IA: CPT | Performed by: NURSE PRACTITIONER

## 2022-01-10 PROCEDURE — 80305 DRUG TEST PRSMV DIR OPT OBS: CPT | Performed by: NURSE PRACTITIONER

## 2022-01-10 PROCEDURE — 87804 INFLUENZA ASSAY W/OPTIC: CPT | Performed by: NURSE PRACTITIONER

## 2022-01-10 PROCEDURE — 99214 OFFICE O/P EST MOD 30 MIN: CPT | Performed by: NURSE PRACTITIONER

## 2022-01-10 RX ORDER — DEXTROMETHORPHAN HYDROBROMIDE AND PROMETHAZINE HYDROCHLORIDE 15; 6.25 MG/5ML; MG/5ML
5 SYRUP ORAL 4 TIMES DAILY PRN
Qty: 118 ML | Refills: 0 | Status: SHIPPED | OUTPATIENT
Start: 2022-01-10 | End: 2022-06-01

## 2022-01-10 RX ORDER — CLONAZEPAM 1 MG/1
1 TABLET ORAL 2 TIMES DAILY PRN
Qty: 60 TABLET | Refills: 0 | Status: SHIPPED | OUTPATIENT
Start: 2022-01-10 | End: 2022-02-23 | Stop reason: SDUPTHER

## 2022-01-10 NOTE — ASSESSMENT & PLAN NOTE
Patient was needing refill for her anxiety medication, she was due for urine drug screen and controlled substances contract, this was completed at today's visit.  We will send request to Dr. Romi Rubio as she is the prescribing provider for that medication.

## 2022-01-10 NOTE — ASSESSMENT & PLAN NOTE
Patient with cough, most likely viral in nature, back pain is likely from the coughing and overexertion when putting all of her [place] items away.  We will try some Promethazine DM for a few days, patient agreed to try this however she was reluctant as she feels like she is needing antibiotics already.  I told her we would try to for the rest the week, if her symptoms do not subside or if they worsen we will go ahead and prescribe antibiotics at that time.  She agrees to this plan as of today, advised to follow-up if symptoms worsen.

## 2022-01-10 NOTE — PROGRESS NOTES
"Chief Complaint  Cough, Back Pain (Mid-Upper back when coughing), and Shortness of Breath    Subjective         Sowmya Hodges presents to [facility] for Cough, shortness of breath, back pain.  Patient reports that she has had symptoms for 5 to 6 days, this was after putting up all of her Cristina decorations, the shoulder pain started shortly thereafter.  Patient has had persistent cough, with some very mild shortness of breath.  She denies any other symptoms at this time.  She has not had any known COVID exposures.  She has been fully vaccinated.         Review of Systems   Constitutional: Negative for chills, fatigue and fever.   HENT: Negative for rhinorrhea, sinus pressure, sneezing and sore throat.    Respiratory: Positive for cough and shortness of breath.    Cardiovascular: Negative for chest pain and palpitations.   Gastrointestinal: Negative for diarrhea, nausea and vomiting.   Musculoskeletal: Positive for back pain.   Skin: Negative for rash.   Neurological: Negative for headaches.       Objective   Vital Signs:   /70 (BP Location: Left arm, Patient Position: Sitting, Cuff Size: Adult)   Pulse 87   Temp 96.9 °F (36.1 °C)   Resp 18   Ht 67 cm (26.38\")   Wt 110 kg (242 lb 3.2 oz)   SpO2 96%   .73 kg/m²     Physical Exam  Vitals and nursing note reviewed.   Constitutional:       Appearance: Normal appearance. She is obese.   HENT:      Head: Normocephalic and atraumatic.      Right Ear: Tympanic membrane, ear canal and external ear normal.      Left Ear: Tympanic membrane, ear canal and external ear normal.      Nose: Nose normal.      Mouth/Throat:      Mouth: Mucous membranes are moist.      Pharynx: Oropharynx is clear.   Eyes:      Conjunctiva/sclera: Conjunctivae normal.      Pupils: Pupils are equal, round, and reactive to light.   Cardiovascular:      Rate and Rhythm: Normal rate and regular rhythm.   Pulmonary:      Effort: Pulmonary effort is normal. "      Breath sounds: Normal breath sounds.   Neurological:      Mental Status: She is alert.   Psychiatric:         Mood and Affect: Mood normal.        Result Review :                   Diagnoses and all orders for this visit:    1. Viral illness (Primary)  Assessment & Plan:  Patient with cough, most likely viral in nature, back pain is likely from the coughing and overexertion when putting all of her Cristina items away.  We will try some Promethazine DM for a few days, patient agreed to try this however she was reluctant as she feels like she is needing antibiotics already.  I told her we would try to for the rest the week, if her symptoms do not subside or if they worsen we will go ahead and prescribe antibiotics at that time.  She agrees to this plan as of today, advised to follow-up if symptoms worsen.      2. Cough  -     POCT Influenza A/B  -     POCT SARS-CoV-2 Antigen MAGO    3. Medication management  Assessment & Plan:  Patient was needing refill for her anxiety medication, she was due for urine drug screen and controlled substances contract, this was completed at today's visit.  We will send request to Dr. Romi Rubio as she is the prescribing provider for that medication.    Orders:  -     Urine Drug Screen - Urine, Clean Catch; Future    Other orders  -     promethazine-dextromethorphan (PROMETHAZINE-DM) 6.25-15 MG/5ML syrup; Take 5 mL by mouth 4 (Four) Times a Day As Needed for Cough.  Dispense: 118 mL; Refill: 0        Follow Up   Return if symptoms worsen or fail to improve.  Patient was given instructions and counseling regarding her condition or for health maintenance advice. Please see specific information pulled into the AVS if appropriate.     Fili Abdi, APRN  1/10/2022  This note was electronically signed.

## 2022-01-13 ENCOUNTER — TELEPHONE (OUTPATIENT)
Dept: INTERNAL MEDICINE | Facility: CLINIC | Age: 72
End: 2022-01-13

## 2022-01-13 NOTE — TELEPHONE ENCOUNTER
Caller: MAHAMED    Relationship: Other    Best call back number: [phone]    What was the call regarding: MAHAMED CALLED TO REQUEST AN OFFICE NOTE FROM 9/4/2021 OFFICE VISIT TO GET HER ORTHOPEDIC SHOES APPROVED.  PLEASE CALL BACK AND ADVISE, THANK YOU.    [phone]    Do you require a callback: YES

## 2022-01-18 RX ORDER — FLUTICASONE PROPIONATE 50 MCG
2 SPRAY, SUSPENSION (ML) NASAL DAILY
Qty: 48 ML | Refills: 1 | Status: SHIPPED | OUTPATIENT
Start: 2022-01-18 | End: 2022-11-02

## 2022-01-18 RX ORDER — LISINOPRIL 2.5 MG/1
2.5 TABLET ORAL DAILY
Qty: 90 TABLET | Refills: 1 | Status: SHIPPED | OUTPATIENT
Start: 2022-01-18 | End: 2022-09-22

## 2022-01-18 NOTE — TELEPHONE ENCOUNTER
Caller: Sowmya Hodges    Relationship: Self    Best call back number: [phone]    Requested Prescriptions:   Requested Prescriptions     Pending Prescriptions Disp Refills   • lisinopril (PRINIVIL,ZESTRIL) 2.5 MG tablet 90 tablet 1     Sig: Take 1 tablet by mouth Daily.   • fluticasone (FLONASE) 50 MCG/ACT nasal spray 48 mL 1        Pharmacy where request should be sent: [facility] #[id number] - [place], [place] - [address] [facility] [phone] [facility] [phone] FX     Additional details provided by patient:     PATIENT IS OUT OF THESE MEDICATION     Does the patient have less than a 3 day supply:  [x] Yes  [] No    Mounika Evans Rep   01/18/22 12:04 EST

## 2022-02-01 RX ORDER — ESCITALOPRAM OXALATE 20 MG/1
TABLET ORAL
Qty: 90 TABLET | Refills: 1 | Status: SHIPPED | OUTPATIENT
Start: 2022-02-01 | End: 2022-10-11

## 2022-02-21 ENCOUNTER — TELEPHONE (OUTPATIENT)
Dept: INTERNAL MEDICINE | Facility: CLINIC | Age: 72
End: 2022-02-21

## 2022-02-22 DIAGNOSIS — F41.9 ANXIETY: ICD-10-CM

## 2022-02-22 RX ORDER — BUSPIRONE HYDROCHLORIDE 15 MG/1
15 TABLET ORAL 3 TIMES DAILY PRN
Qty: 90 TABLET | Refills: 0 | Status: SHIPPED | OUTPATIENT
Start: 2022-02-22 | End: 2022-11-02

## 2022-02-23 DIAGNOSIS — F41.9 ANXIETY: ICD-10-CM

## 2022-02-23 RX ORDER — CLONAZEPAM 1 MG/1
1 TABLET ORAL 2 TIMES DAILY PRN
Qty: 60 TABLET | Refills: 2 | Status: SHIPPED | OUTPATIENT
Start: 2022-02-23 | End: 2022-06-01 | Stop reason: SDUPTHER

## 2022-02-23 NOTE — TELEPHONE ENCOUNTER
Red rule verified and correct.    Pt asking for refills of 2 meds.  Buspar and klonopin.    Advised the Buspar was already sent.    Requested Prescriptions     Pending Prescriptions Disp Refills   • clonazePAM (KlonoPIN) 1 MG tablet 60 tablet 0     Sig: Take 1 tablet by mouth 2 (Two) Times a Day As Needed for Anxiety.     LOV - 1/10/22    LRF - 1/10/22    UDS - 1/10/22    Contract - 1/10/22    Medications pended per protocol.

## 2022-02-28 ENCOUNTER — OFFICE VISIT (OUTPATIENT)
Dept: GASTROENTEROLOGY | Facility: CLINIC | Age: 72
End: 2022-02-28

## 2022-02-28 VITALS
HEIGHT: 67 IN | SYSTOLIC BLOOD PRESSURE: 130 MMHG | DIASTOLIC BLOOD PRESSURE: 61 MMHG | HEART RATE: 74 BPM | WEIGHT: 245.4 LBS | BODY MASS INDEX: 38.52 KG/M2

## 2022-02-28 DIAGNOSIS — K58.1 IRRITABLE BOWEL SYNDROME WITH CONSTIPATION: ICD-10-CM

## 2022-02-28 DIAGNOSIS — R12 HEARTBURN: ICD-10-CM

## 2022-02-28 DIAGNOSIS — K76.0 FATTY LIVER: ICD-10-CM

## 2022-02-28 DIAGNOSIS — R13.12 OROPHARYNGEAL DYSPHAGIA: Primary | ICD-10-CM

## 2022-02-28 PROCEDURE — 99214 OFFICE O/P EST MOD 30 MIN: CPT | Performed by: NURSE PRACTITIONER

## 2022-02-28 RX ORDER — SEMAGLUTIDE 1.34 MG/ML
INJECTION, SOLUTION SUBCUTANEOUS
COMMUNITY
Start: 2022-02-18 | End: 2022-05-05

## 2022-02-28 NOTE — PROGRESS NOTES
"Patient Name: Sowmya Hodges   Visit Date: 02/28/2022   Patient ID: [id number]  Provider: LORENZO Paz    Sex: female  Location:  Location Address:  Location Phone: [phone]  [place] [address] [phone]    YOB: 1950  Age: 71 y.o.   Primary Care Provider Romi Rubio MD      Referring Provider: No ref. provider found        Chief Complaint  Fatty Liver (Follow up)    History of Present Illness  Patient initially presented in 2017 with abdominal pain and heartburn.  GES December 2017 within normal limits.    PMH significant for renal cell carcinoma 2014 status post partial right nephrectomy, follows w DR Butts.  History of fatty liver seen on CT scan, hepatic work-up has been completed 4/2020 and negative.  Patient did report a cousin with cirrhosis and was told he had a genetic issue     Last EGD colonoscopy 12/22/2020: Normal esophagus, erosive gastritis--biopsy with gastropathy, normal duodenum--biopsy with duodenitis; good prep, normal colonoscopy, 3 small adenomatous polyps in the ascending colon completely removed, random colon biopsies negative    Patient was last seen February 2021 reported diarrhea was much improved after Xifaxan course.  She was advised to follow-up as needed and to lose weight for fatty liver.    Today, pt states her BM's are pretty normal, some constipation at times but no diarrhea. Does c/o lower abd, not daily, but intermittent x months. C/o feeling bloated frequently.  Has not tried Bentyl for this recently although she has at home. States this is same discomfort that she's had in past.  She states she has a bottle of \"brown pills\", after discussion I think this is a refill of Xifaxan.  I advised patient not to take this at this time as she does not have any diarrhea.  C/o HB, taking Protonix 40 mg/d. States when she burps it has a terrible taste. She has also been on Prilosec and Nexium in past. Was started on Ozempic about 1 year " ago. Notes she gets full quickly. A1c's are improved per pt.   Has also had coughing that leads to vomiting, does admit to choking easily w solids. States it's very random and not weekly.   Past Medical History:   Diagnosis Date   • Anemia    • Anxiety    • Arthritis    • Constipation    • Depression    • Diabetes (HCC)    • Diverticulitis    • Epilepsy (HCC)    • Fracture of distal fibula 09/26/2017    right   • GERD (gastroesophageal reflux disease)    • Heart disease    • HTN (hypertension)    • Hyperlipidemia    • Limb swelling    • Lower abdominal pain    • TAYLOR (obstructive sleep apnea)    • Polyp, sinus maxillary    • Renal cell adenocarcinoma, right (HCC) 12/10/2014   • Seasonal allergies    • Shortness of breath        Past Surgical History:   Procedure Laterality Date   • CARDIAC SURGERY      open heart at age 4    • CARDIAC VALVE SURGERY     • CHOLECYSTECTOMY     • COLONOSCOPY  04/12/2017    dr monique   • ENDOSCOPY  04/12/2017    dr monique   • EYE SURGERY      implant   • NEPHRECTOMY PARTIAL Right 12/02/2014    robotic right partial nephrectomy w dr garvey   • OTHER SURGICAL HISTORY      joint surgery   • SALIVARY GLAND SURGERY     • TONSILLECTOMY     • TOTAL KNEE ARTHROPLASTY Right    • UPPER GASTROINTESTINAL ENDOSCOPY  12/22/2020    Dr. Monique   • WRIST SURGERY Left        Allergies   Allergen Reactions   • Codeine Rash       Family History   Problem Relation Age of Onset   • Arthritis Mother    • Arthritis Father    • Cancer Father    • Arthritis Sister    • Arthritis Brother    • Colon cancer Neg Hx         Social History     Tobacco Use   • Smoking status: Former Smoker     Packs/day: 1.50     Years: 4.00     Pack years: 6.00   • Smokeless tobacco: Never Used   Vaping Use   • Vaping Use: Never used   Substance Use Topics   • Alcohol use: Never     Comment: drinks less than 1 drink per day    • Drug use: Never       Objective     Vital Signs:   /61 (BP Location: Left arm, Patient Position:  "Sitting, Cuff Size: Large Adult)   Pulse 74   Ht 170.2 cm (67\")   Wt 111 kg (245 lb 6.4 oz)   BMI 38.44 kg/m²       Physical Exam  Constitutional:       General: The patient is not in acute distress.     Appearance: Normal appearance.   HENT:      Head: Normocephalic and atraumatic.      Nose: Nose normal.   Pulmonary:      Effort: Pulmonary effort is normal. No respiratory distress.   Abdominal:      General: Abdomen is flat.      Palpations: Abdomen is soft. There is no mass.      Tenderness: There is no abdominal tenderness. There is no guarding.   Musculoskeletal:      Cervical back: Neck supple.      Right lower leg: No edema.      Left lower leg: No edema.   Skin:     General: Skin is warm and dry.   Neurological:      General: No focal deficit present.      Mental Status: The patient is alert and oriented to person, place, and time.      Gait: Gait normal.   Psychiatric:         Mood and Affect: Mood normal.         Speech: Speech normal.         Behavior: Behavior normal.         Thought Content: Thought content normal.     Result Review :   The following data was reviewed by: LORENZO Paz on 02/28/2022:    CBC w/diff    CBC w/Diff 7/2/21   WBC 5.24   RBC 4.59   Hemoglobin 13.5   Hematocrit 40.6   MCV 88.5   MCH 29.4   MCHC 33.3   RDW 13.2   Platelets 325   Neutrophil Rel % 60.2   Immature Granulocyte Rel % 0.8 (A)   Lymphocyte Rel % 30.7   Monocyte Rel % 6.7   Eosinophil Rel % 1.0   Basophil Rel % 0.6   (A) Abnormal value            CMP    CMP 7/2/21   Glucose 123 (A)   BUN 12   Creatinine 0.88   eGFR Non African Am 64   Sodium 139   Potassium 4.4   Chloride 105   Calcium 9.5   Albumin 4.60   Total Bilirubin 0.4   Alkaline Phosphatase 81   AST (SGOT) 30   ALT (SGPT) 35 (A)   (A) Abnormal value                          Assessment and Plan    Diagnoses and all orders for this visit:    1. Oropharyngeal dysphagia (Primary)  -     FL Video Swallow With Speech Single Contrast; Future  -     " "FL Esophagram Complete Double-Contrast; Future    2. Fatty liver  -     Liver Elastography    3. Irritable bowel syndrome with constipation  Comments:  has had IBS-D in past    4. Heartburn            Follow Up   Return in about 2 months (around 4/28/2022).   Fibroscan d/w pt to better assess fatty liver and she's agreeable. Low carb diet encouraged and wt loss encouraged.  Recommended small frequent meals, I think some of pt's upper GI sx of HB and early satiety are d/t Ozempic.  Will check barium swallow for dysphagia c/o and go from there.  Advised pt to try Bentyl w next occurrence of lower abd pain, pt verb understanding. Call if not helping. Recommended colace OTC if taking Bentyl.   Also requested patient to call back with name of other medication reported today as \"brown pills\"     patient was given instructions and counseling regarding her condition or for health maintenance advice. Please see specific information pulled into the AVS if appropriate.     "

## 2022-02-28 NOTE — PATIENT INSTRUCTIONS
Try Bentyl w next episode of abd cramping--call if not helping  Call back with name of other medicine reported today

## 2022-03-23 ENCOUNTER — LAB (OUTPATIENT)
Dept: LAB | Facility: HOSPITAL | Age: 72
End: 2022-03-23

## 2022-03-23 ENCOUNTER — OFFICE VISIT (OUTPATIENT)
Dept: GASTROENTEROLOGY | Facility: CLINIC | Age: 72
End: 2022-03-23

## 2022-03-23 VITALS
WEIGHT: 241.8 LBS | BODY MASS INDEX: 37.95 KG/M2 | SYSTOLIC BLOOD PRESSURE: 125 MMHG | DIASTOLIC BLOOD PRESSURE: 56 MMHG | HEART RATE: 70 BPM | HEIGHT: 67 IN

## 2022-03-23 DIAGNOSIS — R10.84 GENERALIZED ABDOMINAL PAIN: ICD-10-CM

## 2022-03-23 DIAGNOSIS — R11.2 NAUSEA AND VOMITING, INTRACTABILITY OF VOMITING NOT SPECIFIED, UNSPECIFIED VOMITING TYPE: ICD-10-CM

## 2022-03-23 DIAGNOSIS — R10.84 GENERALIZED ABDOMINAL PAIN: Primary | ICD-10-CM

## 2022-03-23 LAB
ALBUMIN SERPL-MCNC: 4.2 G/DL (ref 3.5–5.2)
ALBUMIN/GLOB SERPL: 1.3 G/DL
ALP SERPL-CCNC: 95 U/L (ref 39–117)
ALT SERPL W P-5'-P-CCNC: 71 U/L (ref 1–33)
AMYLASE SERPL-CCNC: 94 U/L (ref 28–100)
ANION GAP SERPL CALCULATED.3IONS-SCNC: 11.9 MMOL/L (ref 5–15)
AST SERPL-CCNC: 59 U/L (ref 1–32)
BILIRUB SERPL-MCNC: 0.4 MG/DL (ref 0–1.2)
BUN SERPL-MCNC: 11 MG/DL (ref 8–23)
BUN/CREAT SERPL: 12.1 (ref 7–25)
CALCIUM SPEC-SCNC: 9.8 MG/DL (ref 8.6–10.5)
CHLORIDE SERPL-SCNC: 102 MMOL/L (ref 98–107)
CO2 SERPL-SCNC: 23.1 MMOL/L (ref 22–29)
CREAT SERPL-MCNC: 0.91 MG/DL (ref 0.57–1)
DEPRECATED RDW RBC AUTO: 42.1 FL (ref 37–54)
EGFRCR SERPLBLD CKD-EPI 2021: 68.4 ML/MIN/1.73
ERYTHROCYTE [DISTWIDTH] IN BLOOD BY AUTOMATED COUNT: 13 % (ref 12.3–15.4)
GLOBULIN UR ELPH-MCNC: 3.3 GM/DL
GLUCOSE SERPL-MCNC: 208 MG/DL (ref 65–99)
HCT VFR BLD AUTO: 40.1 % (ref 34–46.6)
HGB BLD-MCNC: 13.5 G/DL (ref 12–15.9)
LIPASE SERPL-CCNC: 47 U/L (ref 13–60)
MCH RBC QN AUTO: 30 PG (ref 26.6–33)
MCHC RBC AUTO-ENTMCNC: 33.7 G/DL (ref 31.5–35.7)
MCV RBC AUTO: 89.1 FL (ref 79–97)
PLATELET # BLD AUTO: 309 10*3/MM3 (ref 140–450)
PMV BLD AUTO: 9.7 FL (ref 6–12)
POTASSIUM SERPL-SCNC: 3.9 MMOL/L (ref 3.5–5.2)
PROT SERPL-MCNC: 7.5 G/DL (ref 6–8.5)
RBC # BLD AUTO: 4.5 10*6/MM3 (ref 3.77–5.28)
SODIUM SERPL-SCNC: 137 MMOL/L (ref 136–145)
WBC NRBC COR # BLD: 4.19 10*3/MM3 (ref 3.4–10.8)

## 2022-03-23 PROCEDURE — 82150 ASSAY OF AMYLASE: CPT

## 2022-03-23 PROCEDURE — 80053 COMPREHEN METABOLIC PANEL: CPT

## 2022-03-23 PROCEDURE — 36415 COLL VENOUS BLD VENIPUNCTURE: CPT

## 2022-03-23 PROCEDURE — 85027 COMPLETE CBC AUTOMATED: CPT

## 2022-03-23 PROCEDURE — 99214 OFFICE O/P EST MOD 30 MIN: CPT | Performed by: NURSE PRACTITIONER

## 2022-03-23 PROCEDURE — 83690 ASSAY OF LIPASE: CPT

## 2022-03-23 RX ORDER — DICYCLOMINE HCL 20 MG
20 TABLET ORAL
Qty: 90 TABLET | Refills: 1 | Status: SHIPPED | OUTPATIENT
Start: 2022-03-23 | End: 2022-04-22

## 2022-03-23 NOTE — PROGRESS NOTES
Patient Name: Sowmya Hodges   Visit Date: 03/23/2022   Patient ID: [id number]  Provider: LORENZO Paz    Sex: female  Location:  Location Address:  Location Phone: [address]  [place] [address] [phone]    YOB: 1950  Age: 71 y.o.   Primary Care Provider Romi Rubio MD      Referring Provider: No ref. provider found        Chief Complaint  Abdominal Pain (Patient states sx started 4 days ago, Lower abdominal pain), Nausea (Sx after eating for the last 4 days), Vomiting (Sx for 4 days after eating), and Diarrhea (Sx for 4 days)    History of Present Illness    Patient initially presented in 2017 with abdominal pain and heartburn.  GES December 2017 within normal limits.     PMH significant for renal cell carcinoma 2014 status post partial right nephrectomy, follows w DR Butts.  History of fatty liver seen on CT scan, hepatic work-up has been completed 4/2020 and negative.  Patient did report a cousin with cirrhosis and was told he had a genetic issue      Last EGD colonoscopy 12/22/2020: Normal esophagus, erosive gastritis--biopsy with gastropathy, normal duodenum--biopsy with duodenitis; good prep, normal colonoscopy, 3 small adenomatous polyps in the ascending colon completely removed, random colon biopsies negative     Patient was last seen 2/28/2022 reported improvement in diarrhea, patient took Xifaxan in February 2021 and this really improved her symptoms.  She did have complaints of abdominal bloating.  Patient had complaints of heartburn, on Protonix, complained of getting full quickly, admitted choking easily with solids and coughing that leads to vomiting intermittently.  FibroScan was ordered to better assess fatty liver, barium swallow ordered, advised to try Bentyl with next episode of lower abdominal discomfort.  Swallowing study is scheduled for April 4-- pt states actually this sx is improved.   FibroScan does not appear to be completed--  scheduled 4/15/22    Pt c/o gen abd pain w meals x 4 days, has had vomiting once, diarrhea x 3 days, this has improved, has had formed stool since then. No fever. Has not been eating much. No vomiting yesterday or this morning, it is 8:45. c/o stomach feeling hard after meal.   No constipation, no blood in stool or black stool.    Denies NSAIDs, states rarely takes Hydrocodone.   Past Medical History:   Diagnosis Date   • Anemia    • Anxiety    • Arthritis    • Constipation    • Depression    • Diabetes (HCC)    • Diverticulitis    • Epilepsy (HCC)    • Fracture of distal fibula 09/26/2017    right   • GERD (gastroesophageal reflux disease)    • Heart disease    • HTN (hypertension)    • Hyperlipidemia    • Limb swelling    • Lower abdominal pain    • TAYLOR (obstructive sleep apnea)    • Polyp, sinus maxillary    • Renal cell adenocarcinoma, right (HCC) 12/10/2014   • Seasonal allergies    • Shortness of breath        Past Surgical History:   Procedure Laterality Date   • CARDIAC SURGERY      open heart at age 4    • CARDIAC VALVE SURGERY     • CHOLECYSTECTOMY     • COLONOSCOPY  04/12/2017    dr monique   • ENDOSCOPY  04/12/2017    dr monique   • EYE SURGERY      implant   • NEPHRECTOMY PARTIAL Right 12/02/2014    robotic right partial nephrectomy w dr garvey   • OTHER SURGICAL HISTORY      joint surgery   • SALIVARY GLAND SURGERY     • TONSILLECTOMY     • TOTAL KNEE ARTHROPLASTY Right    • UPPER GASTROINTESTINAL ENDOSCOPY  12/22/2020    Dr. Monique   • WRIST SURGERY Left        Allergies   Allergen Reactions   • Codeine Rash       Family History   Problem Relation Age of Onset   • Arthritis Mother    • Arthritis Father    • Cancer Father    • Arthritis Sister    • Arthritis Brother    • Colon cancer Neg Hx         Social History     Tobacco Use   • Smoking status: Former Smoker     Packs/day: 1.50     Years: 4.00     Pack years: 6.00   • Smokeless tobacco: Never Used   Vaping Use   • Vaping Use: Never used   Substance  "Use Topics   • Alcohol use: Never     Comment: drinks less than 1 drink per day    • Drug use: Never       Objective     Vital Signs:   /56 (BP Location: Left arm, Patient Position: Sitting, Cuff Size: Large Adult)   Pulse 70   Ht 170.2 cm (67\")   Wt 110 kg (241 lb 12.8 oz)   BMI 37.87 kg/m²       Physical Exam  Constitutional:       General: The patient is not in acute distress.     Appearance: Normal appearance.   HENT:      Head: Normocephalic and atraumatic.      Nose: Nose normal.   Pulmonary:      Effort: Pulmonary effort is normal. No respiratory distress.   Abdominal:      General: Abdomen is flat.      Palpations: Abdomen is soft. There is no mass.      Tenderness: There is no abdominal tenderness. There is no guarding.   Musculoskeletal:      Cervical back: Neck supple.      Right lower leg: No edema.      Left lower leg: No edema.   Skin:     General: Skin is warm and dry.   Neurological:      General: No focal deficit present.      Mental Status: The patient is alert and oriented to person, place, and time.      Gait: Gait normal.   Psychiatric:         Mood and Affect: Mood normal.         Speech: Speech normal.         Behavior: Behavior normal.         Thought Content: Thought content normal.     Result Review :   The following data was reviewed by: LORENZO Paz on 03/23/2022:                    Assessment and Plan    Diagnoses and all orders for this visit:    1. Generalized abdominal pain (Primary)  -     Comprehensive Metabolic Panel; Future  -     CBC (No Diff); Future  -     Amylase; Future  -     Lipase; Future    2. Nausea and vomiting, intractability of vomiting not specified, unspecified vomiting type  -     Comprehensive Metabolic Panel; Future  -     CBC (No Diff); Future  -     Amylase; Future  -     Lipase; Future    Other orders  -     dicyclomine (BENTYL) 20 MG tablet; Take 1 tablet by mouth 4 (Four) Times a Day Before Meals & at Bedtime As Needed (abd pain or " cramping) for up to 30 days.  Dispense: 90 tablet; Refill: 1            Follow Up   Return for next available.   If diarrhea returns will do stool studies  Check labs today  Depending on results and how pt does over the next couple days, may need CT scan, may need EGD   Patient was given instructions and counseling regarding her condition or for health maintenance advice. Please see specific information pulled into the AVS if appropriate.

## 2022-03-25 ENCOUNTER — TELEPHONE (OUTPATIENT)
Dept: GASTROENTEROLOGY | Facility: CLINIC | Age: 72
End: 2022-03-25

## 2022-03-28 ENCOUNTER — TELEPHONE (OUTPATIENT)
Dept: GASTROENTEROLOGY | Facility: CLINIC | Age: 72
End: 2022-03-28

## 2022-03-28 NOTE — TELEPHONE ENCOUNTER
Left message with patient asking for a returned call to follow up on overdue results for imaging studies.    It appears pt was scheduled for Esophagram but cx'd. Pt has f/u here on 4.28.22.

## 2022-04-01 ENCOUNTER — APPOINTMENT (OUTPATIENT)
Dept: OTHER | Facility: HOSPITAL | Age: 72
End: 2022-04-01

## 2022-04-04 ENCOUNTER — APPOINTMENT (OUTPATIENT)
Dept: GENERAL RADIOLOGY | Facility: HOSPITAL | Age: 72
End: 2022-04-04

## 2022-04-04 RX ORDER — LEVOCETIRIZINE DIHYDROCHLORIDE 5 MG/1
TABLET, FILM COATED ORAL
Qty: 90 TABLET | Refills: 1 | Status: SHIPPED | OUTPATIENT
Start: 2022-04-04 | End: 2022-10-03 | Stop reason: SDUPTHER

## 2022-04-04 RX ORDER — PANTOPRAZOLE SODIUM 40 MG/1
TABLET, DELAYED RELEASE ORAL
Qty: 90 TABLET | Refills: 1 | Status: SHIPPED | OUTPATIENT
Start: 2022-04-04 | End: 2022-06-16 | Stop reason: DRUGHIGH

## 2022-04-15 ENCOUNTER — APPOINTMENT (OUTPATIENT)
Dept: OTHER | Facility: HOSPITAL | Age: 72
End: 2022-04-15

## 2022-04-22 RX ORDER — GEMFIBROZIL 600 MG/1
TABLET, FILM COATED ORAL
Qty: 180 TABLET | Refills: 0 | Status: SHIPPED | OUTPATIENT
Start: 2022-04-22 | End: 2022-06-03

## 2022-04-22 RX ORDER — LEVETIRACETAM 750 MG/1
TABLET ORAL
Qty: 180 TABLET | Refills: 0 | Status: SHIPPED | OUTPATIENT
Start: 2022-04-22 | End: 2022-10-03 | Stop reason: SDUPTHER

## 2022-04-28 ENCOUNTER — OFFICE VISIT (OUTPATIENT)
Dept: GASTROENTEROLOGY | Facility: CLINIC | Age: 72
End: 2022-04-28

## 2022-04-28 VITALS
WEIGHT: 241.4 LBS | DIASTOLIC BLOOD PRESSURE: 58 MMHG | BODY MASS INDEX: 37.89 KG/M2 | SYSTOLIC BLOOD PRESSURE: 110 MMHG | HEIGHT: 67 IN | HEART RATE: 75 BPM

## 2022-04-28 DIAGNOSIS — R19.7 DIARRHEA, UNSPECIFIED TYPE: ICD-10-CM

## 2022-04-28 DIAGNOSIS — R10.30 LOWER ABDOMINAL PAIN: Primary | ICD-10-CM

## 2022-04-28 DIAGNOSIS — K76.0 FATTY LIVER: ICD-10-CM

## 2022-04-28 DIAGNOSIS — Z85.528 HISTORY OF RENAL CELL CANCER: ICD-10-CM

## 2022-04-28 PROCEDURE — 99214 OFFICE O/P EST MOD 30 MIN: CPT | Performed by: NURSE PRACTITIONER

## 2022-04-28 RX ORDER — DICYCLOMINE HCL 20 MG
TABLET ORAL
COMMUNITY
End: 2022-06-01

## 2022-04-28 NOTE — PROGRESS NOTES
Patient Name: Sowmya Hodges   Visit Date: 04/28/2022   Patient ID: [id number]  Provider: LORENZO Paz    Sex: female  Location:  Location Address:  Location Phone: [phone]  [place] [address] [phone]    YOB: 1950  Age: 71 y.o.   Primary Care Provider Romi Rubio MD      Referring Provider: No ref. provider found        Chief Complaint  Fatty Liver (Follow up on Video Swallow and esophagram), Diarrhea (Patient says the medication is not helping the diarrhea, three days (ended last week)), and Abdominal Pain (Stomach is upset after every meal)    History of Present Illness    Patient initially presented in 2017 with abdominal pain and heartburn.  GES December 2017 within normal limits.     PMH significant for renal cell carcinoma 2014 status post partial right nephrectomy, follows w DR Butts.  History of fatty liver seen on CT scan, hepatic work-up has been completed 4/2020 and negative.  Patient did report a cousin with cirrhosis and was told he had a genetic issue      Last EGD colonoscopy 12/22/2020: Normal esophagus, erosive gastritis--biopsy with gastropathy, normal duodenum--biopsy with duodenitis; good prep, normal colonoscopy, 3 small adenomatous polyps in the ascending colon completely removed, random colon biopsies negative  February 2/28/22: Xifaxan improved diarrhea.  Esophagram was ordered for dysphagia, FibroScan ordered to better assess fatty liver-patient canceled this test    Patient was last seen March 23 reported generalized abdominal pain with meals for 4 days, vomiting x1 and diarrhea for 3 days but resolved.  Labs were checked and patient had normal amylase and lipase, CBC was normal, CMP unremarkable except slight elevation in liver enzymes-ALT 71, AST 59, this was attributed to history of fatty liver.  We checked on patient and she reported improvement with Bentyl and denied need for further w/u. Sx resolved for a few weeks.     Pt  states she started having sx again last week of lower abd pain after meals, states hurts all the time, cramping, + diarrhea, maybe 3x day. No blood in stool. No fever. +urgency. No nocturnal stools. Taking Bentyl 4 x day, no longer helping. Trying to stay on bland diet.   Canceled esophagram d/t sx resolved.   Canceled Fibroscan d/t fear of results.     Past Medical History:   Diagnosis Date   • Anemia    • Anxiety    • Arthritis    • Constipation    • Depression    • Diabetes (HCC)    • Diverticulitis    • Epilepsy (HCC)    • Fracture of distal fibula 09/26/2017    right   • GERD (gastroesophageal reflux disease)    • Heart disease    • HTN (hypertension)    • Hyperlipidemia    • Limb swelling    • Lower abdominal pain    • TAYLOR (obstructive sleep apnea)    • Polyp, sinus maxillary    • Renal cell adenocarcinoma, right (HCC) 12/10/2014   • Seasonal allergies    • Shortness of breath        Past Surgical History:   Procedure Laterality Date   • CARDIAC SURGERY      open heart at age 4    • CARDIAC VALVE SURGERY     • CHOLECYSTECTOMY     • COLONOSCOPY  04/12/2017    dr monique   • ENDOSCOPY  04/12/2017    dr monique   • EYE SURGERY      implant   • NEPHRECTOMY PARTIAL Right 12/02/2014    robotic right partial nephrectomy w dr garvey   • OTHER SURGICAL HISTORY      joint surgery   • SALIVARY GLAND SURGERY     • TONSILLECTOMY     • TOTAL KNEE ARTHROPLASTY Right    • UPPER GASTROINTESTINAL ENDOSCOPY  12/22/2020    Dr. Monique   • WRIST SURGERY Left        Allergies   Allergen Reactions   • Codeine Rash       Family History   Problem Relation Age of Onset   • Arthritis Mother    • Arthritis Father    • Cancer Father    • Arthritis Sister    • Arthritis Brother    • Colon cancer Neg Hx         Social History     Tobacco Use   • Smoking status: Former Smoker     Packs/day: 1.50     Years: 4.00     Pack years: 6.00   • Smokeless tobacco: Never Used   Vaping Use   • Vaping Use: Never used   Substance Use Topics   • Alcohol use:  "Never     Comment: drinks less than 1 drink per day    • Drug use: Never       Objective     Vital Signs:   /58 (BP Location: Left arm, Patient Position: Sitting, Cuff Size: Large Adult)   Pulse 75   Ht 170.2 cm (67\")   Wt 109 kg (241 lb 6.4 oz)   BMI 37.81 kg/m²       Physical Exam  Constitutional:       General: The patient is not in acute distress.     Appearance: Normal appearance.   HENT:      Head: Normocephalic and atraumatic.      Nose: Nose normal.   Pulmonary:      Effort: Pulmonary effort is normal. No respiratory distress.   Abdominal:      General: Abdomen is flat.      Palpations: Abdomen is soft. There is no mass.      Tenderness: There is no abdominal tenderness. There is no guarding.   Musculoskeletal:      Cervical back: Neck supple.      Right lower leg: No edema.      Left lower leg: No edema.   Skin:     General: Skin is warm and dry.   Neurological:      General: No focal deficit present.      Mental Status: The patient is alert and oriented to person, place, and time.      Gait: Gait normal.   Psychiatric:         Mood and Affect: Mood normal.         Speech: Speech normal.         Behavior: Behavior normal.         Thought Content: Thought content normal.     Result Review :   The following data was reviewed by: LORENZO Paz on 04/28/2022:                    Assessment and Plan    Diagnoses and all orders for this visit:    1. Lower abdominal pain (Primary)  -     CT Abdomen Pelvis With & Without Contrast; Future    2. Diarrhea, unspecified type  -     CT Abdomen Pelvis With & Without Contrast; Future    3. History of renal cell cancer  -     CT Abdomen Pelvis With & Without Contrast; Future    4. Fatty liver            Follow Up   Return for next available.   CT for c/o lower abd pain (Dr Butts had order for w and w/o, so we will order this way to help w need for renal cancer surveillance -- CT from her order in Feb was not done. Pt not sure why).   If CT neg try " Chavez again, consider colonoscopy. Pt agreeable to plan. She did not want to set up colonoscopy yet.   We will revisit Fibroscan at a later time when pt is feeling better.     Patient was given instructions and counseling regarding her condition or for health maintenance advice. Please see specific information pulled into the AVS if appropriate.

## 2022-05-05 RX ORDER — SEMAGLUTIDE 1.34 MG/ML
INJECTION, SOLUTION SUBCUTANEOUS
Qty: 3 PEN | Refills: 1 | Status: SHIPPED | OUTPATIENT
Start: 2022-05-05 | End: 2022-06-02

## 2022-05-13 ENCOUNTER — APPOINTMENT (OUTPATIENT)
Dept: CT IMAGING | Facility: HOSPITAL | Age: 72
End: 2022-05-13

## 2022-05-19 ENCOUNTER — HOSPITAL ENCOUNTER (OUTPATIENT)
Dept: CT IMAGING | Facility: HOSPITAL | Age: 72
Discharge: HOME | End: 2022-05-19
Admitting: NURSE PRACTITIONER

## 2022-05-19 DIAGNOSIS — Z85.528 HISTORY OF RENAL CELL CANCER: ICD-10-CM

## 2022-05-19 DIAGNOSIS — R10.30 LOWER ABDOMINAL PAIN: ICD-10-CM

## 2022-05-19 DIAGNOSIS — R19.7 DIARRHEA, UNSPECIFIED TYPE: ICD-10-CM

## 2022-05-19 LAB
CREAT BLDA-MCNC: 0.7 MG/DL
EGFRCR SERPLBLD CKD-EPI 2021: 93.8 ML/MIN/1.73

## 2022-05-19 PROCEDURE — 0 IOPAMIDOL PER 1 ML: Performed by: NURSE PRACTITIONER

## 2022-05-19 PROCEDURE — 74178 CT ABD&PLV WO CNTR FLWD CNTR: CPT

## 2022-05-19 PROCEDURE — 82565 ASSAY OF CREATININE: CPT

## 2022-05-19 RX ADMIN — IOPAMIDOL 100 ML: 755 INJECTION, SOLUTION INTRAVENOUS at 08:10

## 2022-05-23 RX ORDER — DAPAGLIFLOZIN 10 MG/1
TABLET, FILM COATED ORAL
Qty: 90 TABLET | Refills: 1 | Status: SHIPPED | OUTPATIENT
Start: 2022-05-23 | End: 2022-12-19 | Stop reason: SDUPTHER

## 2022-05-24 ENCOUNTER — TELEPHONE (OUTPATIENT)
Dept: GASTROENTEROLOGY | Facility: CLINIC | Age: 72
End: 2022-05-24

## 2022-05-24 NOTE — TELEPHONE ENCOUNTER
Center ABD pain when eating/after eating, pt is not having diarrhea at this time. N/V on Sunday, vomiting occurred a few times that day.   Note sent to TR

## 2022-05-27 ENCOUNTER — PREP FOR SURGERY (OUTPATIENT)
Dept: OTHER | Facility: HOSPITAL | Age: 72
End: 2022-05-27

## 2022-05-27 DIAGNOSIS — R11.2 NAUSEA AND VOMITING IN ADULT: Primary | ICD-10-CM

## 2022-06-01 ENCOUNTER — OFFICE VISIT (OUTPATIENT)
Dept: INTERNAL MEDICINE | Facility: CLINIC | Age: 72
End: 2022-06-01

## 2022-06-01 VITALS
SYSTOLIC BLOOD PRESSURE: 102 MMHG | DIASTOLIC BLOOD PRESSURE: 52 MMHG | HEART RATE: 72 BPM | TEMPERATURE: 98.6 F | WEIGHT: 238 LBS | OXYGEN SATURATION: 98 % | BODY MASS INDEX: 37.35 KG/M2 | HEIGHT: 67 IN

## 2022-06-01 DIAGNOSIS — M54.42 CHRONIC BILATERAL LOW BACK PAIN WITH BILATERAL SCIATICA: ICD-10-CM

## 2022-06-01 DIAGNOSIS — E55.9 VITAMIN D DEFICIENCY: ICD-10-CM

## 2022-06-01 DIAGNOSIS — F41.9 ANXIETY: ICD-10-CM

## 2022-06-01 DIAGNOSIS — N83.202 CYST OF LEFT OVARY: ICD-10-CM

## 2022-06-01 DIAGNOSIS — E11.65 TYPE 2 DIABETES MELLITUS WITH HYPERGLYCEMIA, WITHOUT LONG-TERM CURRENT USE OF INSULIN: Primary | ICD-10-CM

## 2022-06-01 DIAGNOSIS — M54.41 CHRONIC BILATERAL LOW BACK PAIN WITH BILATERAL SCIATICA: ICD-10-CM

## 2022-06-01 DIAGNOSIS — G89.29 CHRONIC BILATERAL LOW BACK PAIN WITH BILATERAL SCIATICA: ICD-10-CM

## 2022-06-01 DIAGNOSIS — Z79.899 LONG TERM CURRENT USE OF THERAPEUTIC DRUG: ICD-10-CM

## 2022-06-01 DIAGNOSIS — K21.9 GASTROESOPHAGEAL REFLUX DISEASE, UNSPECIFIED WHETHER ESOPHAGITIS PRESENT: ICD-10-CM

## 2022-06-01 DIAGNOSIS — E04.1 THYROID NODULE: ICD-10-CM

## 2022-06-01 DIAGNOSIS — J30.9 ALLERGIC RHINITIS, UNSPECIFIED SEASONALITY, UNSPECIFIED TRIGGER: ICD-10-CM

## 2022-06-01 DIAGNOSIS — E78.5 HYPERLIPIDEMIA, UNSPECIFIED HYPERLIPIDEMIA TYPE: ICD-10-CM

## 2022-06-01 DIAGNOSIS — G40.909 NONINTRACTABLE EPILEPSY WITHOUT STATUS EPILEPTICUS, UNSPECIFIED EPILEPSY TYPE: ICD-10-CM

## 2022-06-01 DIAGNOSIS — Z12.31 VISIT FOR SCREENING MAMMOGRAM: ICD-10-CM

## 2022-06-01 PROBLEM — B34.9 VIRAL ILLNESS: Status: RESOLVED | Noted: 2022-01-10 | Resolved: 2022-06-01

## 2022-06-01 LAB
25(OH)D3 SERPL-MCNC: 57.1 NG/ML (ref 30–100)
ALBUMIN SERPL-MCNC: 4.5 G/DL (ref 3.5–5.2)
ALBUMIN/GLOB SERPL: 1.6 G/DL
ALP SERPL-CCNC: 99 U/L (ref 39–117)
ALT SERPL W P-5'-P-CCNC: 72 U/L (ref 1–33)
ANION GAP SERPL CALCULATED.3IONS-SCNC: 11.7 MMOL/L (ref 5–15)
AST SERPL-CCNC: 69 U/L (ref 1–32)
BASOPHILS # BLD AUTO: 0.03 10*3/MM3 (ref 0–0.2)
BASOPHILS NFR BLD AUTO: 0.5 % (ref 0–1.5)
BILIRUB SERPL-MCNC: 0.3 MG/DL (ref 0–1.2)
BUN SERPL-MCNC: 11 MG/DL (ref 8–23)
BUN/CREAT SERPL: 14.5 (ref 7–25)
CALCIUM SPEC-SCNC: 9.7 MG/DL (ref 8.6–10.5)
CHLORIDE SERPL-SCNC: 104 MMOL/L (ref 98–107)
CHOLEST SERPL-MCNC: 213 MG/DL (ref 0–200)
CO2 SERPL-SCNC: 23.3 MMOL/L (ref 22–29)
CREAT SERPL-MCNC: 0.76 MG/DL (ref 0.57–1)
DEPRECATED RDW RBC AUTO: 43 FL (ref 37–54)
EGFRCR SERPLBLD CKD-EPI 2021: 84.9 ML/MIN/1.73
EOSINOPHIL # BLD AUTO: 0.07 10*3/MM3 (ref 0–0.4)
EOSINOPHIL NFR BLD AUTO: 1.2 % (ref 0.3–6.2)
ERYTHROCYTE [DISTWIDTH] IN BLOOD BY AUTOMATED COUNT: 13.1 % (ref 12.3–15.4)
GLOBULIN UR ELPH-MCNC: 2.8 GM/DL
GLUCOSE SERPL-MCNC: 182 MG/DL (ref 65–99)
HBA1C MFR BLD: 6.5 % (ref 4.8–5.6)
HCT VFR BLD AUTO: 39.7 % (ref 34–46.6)
HDLC SERPL-MCNC: 45 MG/DL (ref 40–60)
HGB BLD-MCNC: 13.2 G/DL (ref 12–15.9)
IMM GRANULOCYTES # BLD AUTO: 0.02 10*3/MM3 (ref 0–0.05)
IMM GRANULOCYTES NFR BLD AUTO: 0.4 % (ref 0–0.5)
LDLC SERPL CALC-MCNC: 137 MG/DL (ref 0–100)
LDLC/HDLC SERPL: 2.96 {RATIO}
LYMPHOCYTES # BLD AUTO: 1.7 10*3/MM3 (ref 0.7–3.1)
LYMPHOCYTES NFR BLD AUTO: 29.9 % (ref 19.6–45.3)
MCH RBC QN AUTO: 30.1 PG (ref 26.6–33)
MCHC RBC AUTO-ENTMCNC: 33.2 G/DL (ref 31.5–35.7)
MCV RBC AUTO: 90.4 FL (ref 79–97)
MONOCYTES # BLD AUTO: 0.35 10*3/MM3 (ref 0.1–0.9)
MONOCYTES NFR BLD AUTO: 6.2 % (ref 5–12)
NEUTROPHILS NFR BLD AUTO: 3.52 10*3/MM3 (ref 1.7–7)
NEUTROPHILS NFR BLD AUTO: 61.8 % (ref 42.7–76)
NRBC BLD AUTO-RTO: 0 /100 WBC (ref 0–0.2)
PLATELET # BLD AUTO: 295 10*3/MM3 (ref 140–450)
PMV BLD AUTO: 9.4 FL (ref 6–12)
POTASSIUM SERPL-SCNC: 4.2 MMOL/L (ref 3.5–5.2)
PROT SERPL-MCNC: 7.3 G/DL (ref 6–8.5)
RBC # BLD AUTO: 4.39 10*6/MM3 (ref 3.77–5.28)
SODIUM SERPL-SCNC: 139 MMOL/L (ref 136–145)
TRIGL SERPL-MCNC: 175 MG/DL (ref 0–150)
TSH SERPL DL<=0.05 MIU/L-ACNC: 0.96 UIU/ML (ref 0.27–4.2)
VLDLC SERPL-MCNC: 31 MG/DL (ref 5–40)
WBC NRBC COR # BLD: 5.69 10*3/MM3 (ref 3.4–10.8)

## 2022-06-01 PROCEDURE — 80061 LIPID PANEL: CPT | Performed by: NURSE PRACTITIONER

## 2022-06-01 PROCEDURE — 84443 ASSAY THYROID STIM HORMONE: CPT | Performed by: NURSE PRACTITIONER

## 2022-06-01 PROCEDURE — 83036 HEMOGLOBIN GLYCOSYLATED A1C: CPT | Performed by: NURSE PRACTITIONER

## 2022-06-01 PROCEDURE — 80053 COMPREHEN METABOLIC PANEL: CPT | Performed by: NURSE PRACTITIONER

## 2022-06-01 PROCEDURE — 85025 COMPLETE CBC W/AUTO DIFF WBC: CPT | Performed by: NURSE PRACTITIONER

## 2022-06-01 PROCEDURE — 99214 OFFICE O/P EST MOD 30 MIN: CPT | Performed by: NURSE PRACTITIONER

## 2022-06-01 PROCEDURE — 82043 UR ALBUMIN QUANTITATIVE: CPT | Performed by: NURSE PRACTITIONER

## 2022-06-01 PROCEDURE — 82306 VITAMIN D 25 HYDROXY: CPT | Performed by: NURSE PRACTITIONER

## 2022-06-01 RX ORDER — BUSPIRONE HYDROCHLORIDE 15 MG/1
15 TABLET ORAL 3 TIMES DAILY PRN
Qty: 90 TABLET | Refills: 0 | Status: CANCELLED | OUTPATIENT
Start: 2022-06-01

## 2022-06-01 RX ORDER — CLONAZEPAM 1 MG/1
1 TABLET ORAL 2 TIMES DAILY PRN
Qty: 60 TABLET | Refills: 2 | Status: SHIPPED | OUTPATIENT
Start: 2022-06-01 | End: 2022-11-02 | Stop reason: SDUPTHER

## 2022-06-01 NOTE — TELEPHONE ENCOUNTER
Patient was seen today by Katherine French   Please advise refill request for     clonazePAM (KlonoPIN) 1 MG tablet    Pended per provider.   Please advise.

## 2022-06-01 NOTE — ASSESSMENT & PLAN NOTE
Poorly controlled, most recent .  Discussed with patient increased ASCVD score and likely need for statin.  Will repeat lipid panel today and determine further plan of care based on results.  Will likely initiate statin.

## 2022-06-01 NOTE — ASSESSMENT & PLAN NOTE
Anxiety/depression well controlled, continue Lexapro, Buspar and PRN clonazepam.  Updated UDS in clinic today, refill request sent to Dr. Rubio for clonazepam.. Encouraged patient to continue to monitor. She will seek medical attention immediately if she feels that her mental health is deteriorating. Denies SI/HI. Will continue to monitor.

## 2022-06-01 NOTE — PROGRESS NOTES
Chief Complaint  Follow-up (6 month/), Hyperlipidemia, Diabetes, and Anxiety    Subjective        Sowmya Hodges presents to [facility]  Anxiety-  Currently managed with Buspar, Lexapro and clonazepam as needed (1-2 times a month). States she needs a refill for clonazepam today, UDS and controlled substance agreement 1/2022.     DM2-  Most recent A1c 7.1. Currently managed with Ozempic and Farxiga. Reports home blood glucose readings 130s. Denies lows. Diabetic eye exam: 7/2021, due 7/2022.  Diabetic foot exam: Due. Urine microalbumin: Due. Renal protection: ACE. Pneumonia vaccination: Up to date.    HLD-  Managed with Lopid, patient states she has been on Lipitor in the past but was taken off of this. She did tolerate the Lipitor well and is not sure why it was discontinued.  Most recent .    GERD-  Currently managed with pantoprazole, improved with the medication. Patient scheduled for EGD and gastric emptying study. States she was previously diagnosed with IBS. Admits to stomach upset after she eats regardless of eating bland foods.     Allergic rhinitis-  Well controlled with Xyzal and Flonase.    Lumbago-  Managed with Norco PRN and lidocaine patches. States her pain is getting worse but she does not want to have surgery. States pain is often in her feet with numbness/tingling. Also deals with sciatica periodically.  Currently managed through pain management.    Thyroid nodule-  Due for repeat ultrasound.  Last imaging 2019.    Ovarian cyst-  Due for repeat transvaginal ultrasound.  Patient states that this is monitored closely due to her history of renal cancer.  Last ultrasound 2019.  Most recent CT scan 5/2022, cyst was not discussed at that time.    Epilepsy-  Patient previously on dilantin and phenobarbital. Currently managed with Keppra. Last seizure over 15 years ago.     Mammogram: 9/2021; Family history of breast cancer in sisters both diagnosed  "in 70s  DEXA: 11/2021  Colonoscopy: 12/2020  COVID19 vaccination: Up to date  Pneumonia vaccination: Up to date      Objective   Vital Signs:  /52 (BP Location: Left arm, Patient Position: Sitting, Cuff Size: Large Adult)   Pulse 72   Temp 98.6 °F (37 °C) (Oral)   Ht 170.2 cm (67\")   Wt 108 kg (238 lb)   SpO2 98%   BMI 37.28 kg/m²           Physical Exam  Constitutional:       Appearance: Normal appearance.   HENT:      Head: Normocephalic and atraumatic.      Nose: Nose normal.      Mouth/Throat:      Mouth: Mucous membranes are moist.      Pharynx: Oropharynx is clear.   Eyes:      Extraocular Movements: Extraocular movements intact.      Conjunctiva/sclera: Conjunctivae normal.      Pupils: Pupils are equal, round, and reactive to light.   Neck:      Thyroid: No thyroid mass, thyromegaly or thyroid tenderness.   Cardiovascular:      Rate and Rhythm: Normal rate and regular rhythm.      Heart sounds: Normal heart sounds.   Pulmonary:      Effort: Pulmonary effort is normal.      Breath sounds: Normal breath sounds.   Skin:     General: Skin is warm and dry.   Neurological:      General: No focal deficit present.      Mental Status: She is alert and oriented to person, place, and time.   Psychiatric:         Mood and Affect: Mood normal.         Behavior: Behavior normal.         Thought Content: Thought content normal.        Result Review :                Assessment and Plan   Diagnoses and all orders for this visit:    1. Type 2 diabetes mellitus with hyperglycemia, without long-term current use of insulin (HCC) (Primary)  Assessment & Plan:  Continue Ozempic and Farxiga. Most recent HgbA1c 7.1. Encouraged patient to continue to monitor blood glucose levels and to call with consistent elevations. Repeat labs in clinic today. Diabetic eye exam: 7/2021, due 7/2022.  Diabetic foot exam: Today. Urine microalbumin: Today. Renal protection: ACE. Pneumonia vaccination: Up to date.      Orders:  -     CBC & " Differential  -     Comprehensive Metabolic Panel  -     Lipid Panel  -     TSH  -     MicroAlbumin, Urine, Random - Urine, Clean Catch  -     Hemoglobin A1c    2. Hyperlipidemia, unspecified hyperlipidemia type  Assessment & Plan:  Poorly controlled, most recent .  Discussed with patient increased ASCVD score and likely need for statin.  Will repeat lipid panel today and determine further plan of care based on results.  Will likely initiate statin.      3. Anxiety  Assessment & Plan:  Anxiety/depression well controlled, continue Lexapro, Buspar and PRN clonazepam.  Updated UDS in clinic today, refill request sent to Dr. Rubio for clonazepam.. Encouraged patient to continue to monitor. She will seek medical attention immediately if she feels that her mental health is deteriorating. Denies SI/HI. Will continue to monitor.         4. Gastroesophageal reflux disease, unspecified whether esophagitis present  Assessment & Plan:  Continue pantoprazole.  Follow-up with GI as scheduled for EGD and emptying study.      5. Vitamin D deficiency  Assessment & Plan:  Continue Vitamin D supplement, Vitamin D level with labs today.       Orders:  -     Vitamin D 25 Hydroxy    6. Visit for screening mammogram  Comments:  Mammogram ordered.  Orders:  -     Mammo Screening Digital Tomosynthesis Bilateral With CAD; Future    7. Thyroid nodule  Comments:  Thyroid ultrasound ordered.  Orders:  -     US Thyroid    8. Cyst of left ovary  Comments:  Transvaginal ultrasound ordered.  Orders:  -     US Non-ob Transvaginal; Future    9. Long term current use of therapeutic drug  Comments:  UDS updated today.  Orders:  -     Urine Drug Screen - Urine, Clean Catch; Future  -     Urine Drug Screen - Urine, Clean Catch    10. Allergic rhinitis, unspecified seasonality, unspecified trigger  Assessment & Plan:  Well-controlled, continue Xyzal and Flonase.      11. Chronic bilateral low back pain with bilateral sciatica  Assessment &  Plan:  Continue Norco as needed and lidocaine patches through pain management.      12. Nonintractable epilepsy without status epilepticus, unspecified epilepsy type (HCC)  Assessment & Plan:  Continue Keppra, follow with neurology as scheduled.             Follow Up   Return in about 6 months (around 12/1/2022) for Follow up with Dr. Rubio.  Patient was given instructions and counseling regarding her condition or for health maintenance advice. Please see specific information pulled into the AVS if appropriate.

## 2022-06-01 NOTE — ASSESSMENT & PLAN NOTE
Continue Ozempic and Farxiga. Most recent HgbA1c 7.1. Encouraged patient to continue to monitor blood glucose levels and to call with consistent elevations. Repeat labs in clinic today. Diabetic eye exam: 7/2021, due 7/2022.  Diabetic foot exam: Today. Urine microalbumin: Today. Renal protection: ACE. Pneumonia vaccination: Up to date.

## 2022-06-02 LAB — ALBUMIN UR-MCNC: <1.2 MG/DL

## 2022-06-02 RX ORDER — SEMAGLUTIDE 1.34 MG/ML
INJECTION, SOLUTION SUBCUTANEOUS
Qty: 5 PEN | Refills: 5 | Status: SHIPPED | OUTPATIENT
Start: 2022-06-02

## 2022-06-03 DIAGNOSIS — Z79.899 LONG TERM CURRENT USE OF THERAPEUTIC DRUG: Primary | ICD-10-CM

## 2022-06-03 PROBLEM — R11.2 NAUSEA AND VOMITING IN ADULT: Status: ACTIVE | Noted: 2022-06-03

## 2022-06-03 PROCEDURE — 80305 DRUG TEST PRSMV DIR OPT OBS: CPT | Performed by: INTERNAL MEDICINE

## 2022-06-03 RX ORDER — ATORVASTATIN CALCIUM 20 MG/1
20 TABLET, FILM COATED ORAL DAILY
Qty: 90 TABLET | Refills: 1 | Status: SHIPPED | OUTPATIENT
Start: 2022-06-03 | End: 2022-10-03 | Stop reason: SDUPTHER

## 2022-06-09 ENCOUNTER — HOSPITAL ENCOUNTER (OUTPATIENT)
Facility: HOSPITAL | Age: 72
Setting detail: HOSPITAL OUTPATIENT SURGERY
Discharge: HOME | End: 2022-06-09
Attending: INTERNAL MEDICINE | Admitting: INTERNAL MEDICINE

## 2022-06-09 ENCOUNTER — ANESTHESIA EVENT (OUTPATIENT)
Dept: GASTROENTEROLOGY | Facility: HOSPITAL | Age: 72
End: 2022-06-09

## 2022-06-09 ENCOUNTER — ANESTHESIA (OUTPATIENT)
Dept: GASTROENTEROLOGY | Facility: HOSPITAL | Age: 72
End: 2022-06-09

## 2022-06-09 VITALS
HEART RATE: 70 BPM | WEIGHT: 237.22 LBS | TEMPERATURE: 97.3 F | BODY MASS INDEX: 37.15 KG/M2 | OXYGEN SATURATION: 97 % | RESPIRATION RATE: 16 BRPM | SYSTOLIC BLOOD PRESSURE: 126 MMHG | DIASTOLIC BLOOD PRESSURE: 66 MMHG

## 2022-06-09 DIAGNOSIS — R11.2 NAUSEA AND VOMITING IN ADULT: ICD-10-CM

## 2022-06-09 PROCEDURE — 43239 EGD BIOPSY SINGLE/MULTIPLE: CPT | Performed by: INTERNAL MEDICINE

## 2022-06-09 PROCEDURE — 25010000002 PROPOFOL 10 MG/ML EMULSION: Performed by: NURSE ANESTHETIST, CERTIFIED REGISTERED

## 2022-06-09 PROCEDURE — 88305 TISSUE EXAM BY PATHOLOGIST: CPT | Performed by: INTERNAL MEDICINE

## 2022-06-09 RX ORDER — PROPOFOL 10 MG/ML
VIAL (ML) INTRAVENOUS AS NEEDED
Status: DISCONTINUED | OUTPATIENT
Start: 2022-06-09 | End: 2022-06-09 | Stop reason: SURG

## 2022-06-09 RX ORDER — SODIUM CHLORIDE, SODIUM LACTATE, POTASSIUM CHLORIDE, CALCIUM CHLORIDE 600; 310; 30; 20 MG/100ML; MG/100ML; MG/100ML; MG/100ML
30 INJECTION, SOLUTION INTRAVENOUS CONTINUOUS
Status: DISCONTINUED | OUTPATIENT
Start: 2022-06-09 | End: 2022-06-09 | Stop reason: HOSPADM

## 2022-06-09 RX ORDER — LIDOCAINE HYDROCHLORIDE 20 MG/ML
INJECTION, SOLUTION EPIDURAL; INFILTRATION; INTRACAUDAL; PERINEURAL AS NEEDED
Status: DISCONTINUED | OUTPATIENT
Start: 2022-06-09 | End: 2022-06-09 | Stop reason: SURG

## 2022-06-09 RX ORDER — SUCRALFATE 1 G/1
1 TABLET ORAL 4 TIMES DAILY
Qty: 120 TABLET | Refills: 1 | Status: SHIPPED | OUTPATIENT
Start: 2022-06-09 | End: 2022-10-19

## 2022-06-09 RX ADMIN — SODIUM CHLORIDE, POTASSIUM CHLORIDE, SODIUM LACTATE AND CALCIUM CHLORIDE 30 ML/HR: 600; 310; 30; 20 INJECTION, SOLUTION INTRAVENOUS at 12:28

## 2022-06-09 RX ADMIN — PROPOFOL 150 MCG/KG/MIN: 10 INJECTION, EMULSION INTRAVENOUS at 13:24

## 2022-06-09 RX ADMIN — PROPOFOL 60 MG: 10 INJECTION, EMULSION INTRAVENOUS at 13:24

## 2022-06-09 RX ADMIN — LIDOCAINE HYDROCHLORIDE 100 MG: 20 INJECTION, SOLUTION EPIDURAL; INFILTRATION; INTRACAUDAL; PERINEURAL at 13:24

## 2022-06-09 NOTE — H&P
Pre Procedure History & Physical    Chief Complaint:   Nausea vomiting    Subjective     HPI:   Nausea vomiting    Past Medical History:   Past Medical History:   Diagnosis Date   • Anemia    • Anxiety    • Arthritis    • Constipation    • Depression    • Diabetes (HCC)    • Diverticulitis    • Epilepsy (HCC)    • Fracture of distal fibula 09/26/2017    right   • GERD (gastroesophageal reflux disease)    • Heart disease    • HTN (hypertension)    • Hyperlipidemia    • Limb swelling    • Lower abdominal pain    • TAYLOR (obstructive sleep apnea)    • Polyp, sinus maxillary    • Renal cell adenocarcinoma, right (HCC) 12/10/2014   • Seasonal allergies    • Shortness of breath        Past Surgical History:  Past Surgical History:   Procedure Laterality Date   • CARDIAC SURGERY      open heart at age 4    • CARDIAC VALVE SURGERY     • CHOLECYSTECTOMY     • COLONOSCOPY  04/12/2017    dr monique   • ENDOSCOPY  04/12/2017    dr monique   • EYE SURGERY      implant   • NEPHRECTOMY PARTIAL Right 12/02/2014    robotic right partial nephrectomy w dr garvey   • OTHER SURGICAL HISTORY      joint surgery   • SALIVARY GLAND SURGERY     • TONSILLECTOMY     • TOTAL KNEE ARTHROPLASTY Right    • UPPER GASTROINTESTINAL ENDOSCOPY  12/22/2020    Dr. Monique   • WRIST SURGERY Left        Family History:  Family History   Problem Relation Age of Onset   • Arthritis Mother    • Arthritis Father    • Cancer Father    • Arthritis Sister    • Arthritis Brother    • Colon cancer Neg Hx        Social History:   reports that she has quit smoking. She has a 6.00 pack-year smoking history. She has never used smokeless tobacco. She reports that she does not drink alcohol and does not use drugs.    Medications:   Medications Prior to Admission   Medication Sig Dispense Refill Last Dose   • atorvastatin (LIPITOR) 20 MG tablet Take 1 tablet by mouth Daily. 90 tablet 1 Past Week at Unknown time   • busPIRone (BUSPAR) 15 MG tablet Take 1 tablet by mouth 3  (Three) Times a Day As Needed (Anxiety). 90 tablet 0 6/8/2022 at Unknown time   • HYDROcodone-acetaminophen (NORCO)  MG per tablet Take 1 tablet by mouth As Needed.   Past Month at Unknown time   • Blood Glucose Monitoring Suppl (Accu-Chek Sabrina Plus) w/Device kit 1 kit Take As Directed. To checke blood sugar up to 3 times a day. DX E11.9 1 kit 0    • Cholecalciferol 125 MCG (5000 UT) tablet Take 5,000 Units by mouth Daily.      • clonazePAM (KlonoPIN) 1 MG tablet Take 1 tablet by mouth 2 (Two) Times a Day As Needed for Anxiety. 60 tablet 2    • Diclofenac Sodium (VOLTAREN) 1 % gel gel       • escitalopram (LEXAPRO) 20 MG tablet TAKE 1 TABLET BY MOUTH EVERY DAY 90 tablet 1    • Farxiga 10 MG tablet TAKE 1 TABLET (10 MG) BY ORAL ROUTE ONCE DAILY IN THE MORNING -NEEDS APPOINTMENT AND LABS 90 tablet 1    • fluticasone (FLONASE) 50 MCG/ACT nasal spray 2 sprays into the nostril(s) as directed by provider Daily. 48 mL 1    • levETIRAcetam (KEPPRA) 750 MG tablet TAKE 1 TABLET BY MOUTH TWICE A  tablet 0    • levocetirizine (XYZAL) 5 MG tablet TAKE 1 TABLET BY MOUTH EVERY DAY IN THE EVENING 90 tablet 1    • lidocaine (LIDODERM) 5 % Place 1 patch on the skin as directed by provider Daily.      • lisinopril (PRINIVIL,ZESTRIL) 2.5 MG tablet Take 1 tablet by mouth Daily. 90 tablet 1    • Ozempic, 1 MG/DOSE, 4 MG/3ML solution pen-injector INJECT 1 MG UNDER THE SKIN INTO THE APPROPRIATE AREA AS DIRECTED 1 (ONE) TIME PER WEEK FOR 30 DAYS. 5 pen 5    • pantoprazole (PROTONIX) 40 MG EC tablet TAKE 1 TABLET BY MOUTH EVERY DAY 90 tablet 1        Allergies:  Codeine        Objective     Blood pressure 99/81, pulse 69, temperature 97.8 °F (36.6 °C), temperature source Temporal, resp. rate 16, weight 108 kg (237 lb 3.4 oz), SpO2 96 %, not currently breastfeeding.    Physical Exam   Constitutional: Pt is oriented to person, place, and time and well-developed, well-nourished, and in no distress.   Mouth/Throat: Oropharynx is  clear and moist.   Neck: Normal range of motion.   Cardiovascular: Normal rate, regular rhythm and normal heart sounds.    Pulmonary/Chest: Effort normal and breath sounds normal.   Abdominal: Soft. Nontender  Skin: Skin is warm and dry.   Psychiatric: Mood, memory, affect and judgment normal.     Assessment & Plan     Diagnosis:  Nausea vomiting    Anticipated Surgical Procedure:  EGD    The risks, benefits, and alternatives of this procedure have been discussed with the patient or the responsible party- the patient understands and agrees to proceed.

## 2022-06-09 NOTE — ANESTHESIA POSTPROCEDURE EVALUATION
Patient: Sowmya Hodges    Procedure Summary     Date: 06/09/22 Room / Location: [facility] ENDOSCOPY 3 / [facility] ENDOSCOPY    Anesthesia Start: 1321 Anesthesia Stop: 1346    Procedure: ESOPHAGOGASTRODUODENOSCOPY with biopsy (N/A ) Diagnosis:       Nausea and vomiting in adult      (Nausea and vomiting in adult [R11.2])    Surgeons: Sean Oneil MD Provider: Fili French MD    Anesthesia Type: general ASA Status: 3          Anesthesia Type: general    Vitals  No vitals data found for the desired time range.          Post Anesthesia Care and Evaluation    Patient location during evaluation: bedside  Patient participation: complete - patient participated  Level of consciousness: awake  Pain management: adequate    Airway patency: patent  Anesthetic complications: No anesthetic complications  PONV Status: none  Cardiovascular status: acceptable  Respiratory status: acceptable  Hydration status: acceptable    Comments: An Anesthesiologist personally participated in the most demanding procedures (including induction and emergence if applicable) in the anesthesia plan, monitored the course of anesthesia administration at frequent intervals and remained physically present and available for immediate diagnosis and treatment of emergencies.

## 2022-06-09 NOTE — NURSING NOTE
IV inserted and maintained.  Patient educated on procedure, discharge criteria, and discharge instructions.  Consent explained, signed, and witness signature provided.  Warm blanket, low lights offered.  Will continue to update patient on procedure time.    Missy Shrestha RN 12:31 EDT 6/9/2022

## 2022-06-09 NOTE — ANESTHESIA PREPROCEDURE EVALUATION
Anesthesia Evaluation     Patient summary reviewed and Nursing notes reviewed   no history of anesthetic complications:  NPO Solid Status: > 8 hours  NPO Liquid Status: > 2 hours           Airway   Mallampati: III  TM distance: >3 FB  Neck ROM: full  No difficulty expected  Dental      Pulmonary - normal exam    breath sounds clear to auscultation  (+) shortness of breath, sleep apnea,   Cardiovascular - normal exam  Exercise tolerance: good (4-7 METS)    Rhythm: regular  Rate: normal    (+) hypertension, hyperlipidemia,       Neuro/Psych  (+) seizures,    GI/Hepatic/Renal/Endo    (+)  GERD well controlled,  diabetes mellitus,     Musculoskeletal (-) negative ROS    Abdominal    Substance History - negative use     OB/GYN negative ob/gyn ROS         Other - negative ROS                       Anesthesia Plan    ASA 3     general     (Total IV Anesthesia    Patient understands anesthesia not responsible for dental damage.  )  intravenous induction     Anesthetic plan, risks, benefits, and alternatives have been provided, discussed and informed consent has been obtained with: patient.    Plan discussed with CRNA.        CODE STATUS:

## 2022-06-10 LAB
CYTO UR: NORMAL
LAB AP CASE REPORT: NORMAL
LAB AP CLINICAL INFORMATION: NORMAL
PATH REPORT.FINAL DX SPEC: NORMAL
PATH REPORT.GROSS SPEC: NORMAL

## 2022-06-16 ENCOUNTER — OFFICE VISIT (OUTPATIENT)
Dept: GASTROENTEROLOGY | Facility: CLINIC | Age: 72
End: 2022-06-16

## 2022-06-16 ENCOUNTER — LAB (OUTPATIENT)
Dept: LAB | Facility: HOSPITAL | Age: 72
End: 2022-06-16

## 2022-06-16 VITALS
WEIGHT: 238 LBS | DIASTOLIC BLOOD PRESSURE: 54 MMHG | HEIGHT: 67 IN | HEART RATE: 71 BPM | SYSTOLIC BLOOD PRESSURE: 131 MMHG | BODY MASS INDEX: 37.35 KG/M2

## 2022-06-16 DIAGNOSIS — E66.01 CLASS 2 SEVERE OBESITY WITH SERIOUS COMORBIDITY AND BODY MASS INDEX (BMI) OF 37.0 TO 37.9 IN ADULT, UNSPECIFIED OBESITY TYPE: ICD-10-CM

## 2022-06-16 DIAGNOSIS — R11.2 NAUSEA AND VOMITING IN ADULT: ICD-10-CM

## 2022-06-16 DIAGNOSIS — K29.50 CHRONIC GASTRITIS WITHOUT BLEEDING, UNSPECIFIED GASTRITIS TYPE: ICD-10-CM

## 2022-06-16 DIAGNOSIS — K76.0 FATTY LIVER: Primary | ICD-10-CM

## 2022-06-16 DIAGNOSIS — R79.89 ELEVATED LFTS: ICD-10-CM

## 2022-06-16 DIAGNOSIS — R94.5 ABNORMAL RESULTS OF LIVER FUNCTION STUDIES: ICD-10-CM

## 2022-06-16 DIAGNOSIS — K76.0 FATTY LIVER: ICD-10-CM

## 2022-06-16 LAB
ALBUMIN SERPL-MCNC: 3.9 G/DL (ref 3.5–5.2)
ALP SERPL-CCNC: 98 U/L (ref 39–117)
ALT SERPL W P-5'-P-CCNC: 69 U/L (ref 1–33)
AST SERPL-CCNC: 59 U/L (ref 1–32)
BILIRUB CONJ SERPL-MCNC: <0.2 MG/DL (ref 0–0.3)
BILIRUB INDIRECT SERPL-MCNC: ABNORMAL MG/DL
BILIRUB SERPL-MCNC: 0.4 MG/DL (ref 0–1.2)
INR PPP: 1.1 (ref 0.86–1.15)
PROT SERPL-MCNC: 7.4 G/DL (ref 6–8.5)
PROTHROMBIN TIME: 14.4 SECONDS (ref 11.8–14.9)

## 2022-06-16 PROCEDURE — 80076 HEPATIC FUNCTION PANEL: CPT

## 2022-06-16 PROCEDURE — 99214 OFFICE O/P EST MOD 30 MIN: CPT | Performed by: NURSE PRACTITIONER

## 2022-06-16 PROCEDURE — 36415 COLL VENOUS BLD VENIPUNCTURE: CPT

## 2022-06-16 PROCEDURE — 85610 PROTHROMBIN TIME: CPT

## 2022-06-16 RX ORDER — PANTOPRAZOLE SODIUM 20 MG/1
20 TABLET, DELAYED RELEASE ORAL DAILY
Qty: 30 TABLET | Refills: 3 | Status: SHIPPED | OUTPATIENT
Start: 2022-06-16 | End: 2022-08-30

## 2022-06-16 NOTE — PROGRESS NOTES
Patient Name: Sowmya Hodges   Visit Date: 06/16/2022   Patient ID: [id number]  Provider: LORENZO Paz    Sex: female  Location:  Location Address:  Location Phone: [phone]  [place] [address] [phone]    YOB: 1950  Age: 71 y.o.   Primary Care Provider Romi Rubio MD      Referring Provider: No ref. provider found        Chief Complaint  gastritis  (Sucralfate has been helping, pt states no abd pain at this time ), Difficulty Swallowing (No trouble since taking the sucralfate ), and Constipation (Pt is taking stool softeners and still having bms but does have a little constipation )    History of Present Illness  Patient initially presented in 2017 with abdominal pain and heartburn.  GES December 2017 within normal limits.     PMH significant for renal cell carcinoma 2014 status post partial right nephrectomy, follows w DR Butts.  History of fatty liver seen on CT scan, hepatic work-up has been completed 4/2020 and negative.  Patient did report a cousin with cirrhosis and was told he had a genetic issue      Last Colonoscopy (w EGD) 12/22/2020: Normal esophagus, erosive gastritis--biopsy with gastropathy, normal duodenum--biopsy with duodenitis; good prep, normal colonoscopy, 3 small adenomatous polyps in the ascending colon completely removed, random colon biopsies negative    February 2/28/22: Xifaxan improved diarrhea.  Esophagram was ordered for dysphagia- pt canceled d/t sx resolved, FibroScan ordered to better assess fatty liver-patient canceled this test    Patient has had intermittent symptoms since March of generalized abdominal pain, N/V/D, she improved with Bentyl initially, she was last seen in April and reported symptoms returned of lower abdominal pain, diarrhea 3 times a day and Bentyl was no longer helping.  Patient did not want to set up colonoscopy at the last visit, CT of the abdomen and pelvis done 5/19/2022: Negative other than fatty  liver    EGD for persistent nausea vomiting 6/9/2022-esophagus normal, gastritis noted-biopsy with mild chronic inactive gastritis, normal duodenum, Carafate was given     Patient has lost 7 pounds since Feb - pt is trying.   Pt states abd pain is resolved and no further N/V w Carafate. She is having some trouble swallowing the pill. No issues w solids/liquids.   Pt is off Bentyl, having some firm stools, stool softeners as needed now. No HB w Protonix.   Past Medical History:   Diagnosis Date   • Anemia    • Anxiety    • Arthritis    • Constipation    • Depression    • Diabetes (HCC)    • Diverticulitis    • Epilepsy (HCC)    • Fracture of distal fibula 09/26/2017    right   • GERD (gastroesophageal reflux disease)    • Heart disease    • HTN (hypertension)    • Hyperlipidemia    • Limb swelling    • Lower abdominal pain    • TAYLOR (obstructive sleep apnea)    • Polyp, sinus maxillary    • Renal cell adenocarcinoma, right (HCC) 12/10/2014   • Seasonal allergies    • Shortness of breath        Past Surgical History:   Procedure Laterality Date   • CARDIAC SURGERY      open heart at age 4    • CARDIAC VALVE SURGERY     • CHOLECYSTECTOMY     • COLONOSCOPY  04/12/2017    dr monique   • ENDOSCOPY  04/12/2017    dr monique   • ENDOSCOPY N/A 6/9/2022    Procedure: ESOPHAGOGASTRODUODENOSCOPY with biopsy;  Surgeon: Sean Monique MD;  Location: [facility] ENDOSCOPY;  Service: Gastroenterology;  Laterality: N/A;  gastritis   • EYE SURGERY      implant   • NEPHRECTOMY PARTIAL Right 12/02/2014    robotic right partial nephrectomy w dr garvey   • OTHER SURGICAL HISTORY      joint surgery   • SALIVARY GLAND SURGERY     • TONSILLECTOMY     • TOTAL KNEE ARTHROPLASTY Right    • UPPER GASTROINTESTINAL ENDOSCOPY  12/22/2020    Dr. Monique   • WRIST SURGERY Left        Allergies   Allergen Reactions   • Codeine Rash       Family History   Problem Relation Age of Onset   • Arthritis Mother    • Arthritis Father    • Cancer Father    •  "Arthritis Sister    • Arthritis Brother    • Colon cancer Neg Hx         Social History     Tobacco Use   • Smoking status: Former Smoker     Packs/day: 1.50     Years: 4.00     Pack years: 6.00   • Smokeless tobacco: Never Used   Vaping Use   • Vaping Use: Never used   Substance Use Topics   • Alcohol use: Never     Comment: drinks less than 1 drink per day    • Drug use: Never       Objective     Vital Signs:   /54 (BP Location: Left arm, Patient Position: Sitting, Cuff Size: Adult)   Pulse 71   Ht 170.2 cm (67\")   Wt 108 kg (238 lb)   BMI 37.28 kg/m²       Physical Exam  Constitutional:       General: The patient is not in acute distress.     Appearance: Normal appearance.   HENT:      Head: Normocephalic and atraumatic.      Nose: Nose normal.   Pulmonary:      Effort: Pulmonary effort is normal. No respiratory distress.   Abdominal:      General: Abdomen is flat.      Palpations: Abdomen is soft. There is no mass.      Tenderness: There is no abdominal tenderness. There is no guarding.   Musculoskeletal:      Cervical back: Neck supple.      Right lower leg: No edema.      Left lower leg: No edema.   Skin:     General: Skin is warm and dry.   Neurological:      General: No focal deficit present.      Mental Status: The patient is alert and oriented to person, place, and time.      Gait: Gait normal.   Psychiatric:         Mood and Affect: Mood normal.         Speech: Speech normal.         Behavior: Behavior normal.         Thought Content: Thought content normal.     Result Review :   The following data was reviewed by: LORENZO Paz on 06/16/2022:    CBC w/diff    CBC w/Diff 7/2/21 3/23/22 6/1/22   WBC 5.24 4.19 5.69   RBC 4.59 4.50 4.39   Hemoglobin 13.5 13.5 13.2   Hematocrit 40.6 40.1 39.7   MCV 88.5 89.1 90.4   MCH 29.4 30.0 30.1   MCHC 33.3 33.7 33.2   RDW 13.2 13.0 13.1   Platelets 325 309 295   Neutrophil Rel % 60.2  61.8   Immature Granulocyte Rel % 0.8 (A)  0.4   Lymphocyte " Rel % 30.7  29.9   Monocyte Rel % 6.7  6.2   Eosinophil Rel % 1.0  1.2   Basophil Rel % 0.6  0.5   (A) Abnormal value            CMP    CMP 5/19/22 6/1/22 6/16/22   Glucose  182 (A)    BUN  11    Creatinine 0.70 0.76    Sodium  139    Potassium  4.2    Chloride  104    Calcium  9.7    Albumin  4.50 3.90   Total Bilirubin  0.3 0.4   Alkaline Phosphatase  99 98   AST (SGOT)  69 (A) 59 (A)   ALT (SGPT)  72 (A) 69 (A)   (A) Abnormal value       Comments are available for some flowsheets but are not being displayed.                         Assessment and Plan    Diagnoses and all orders for this visit:    1. Fatty liver (Primary)  -     Hepatic Function Panel; Future  -     Protime-INR; Future    2. Elevated LFTs  -     Hepatic Function Panel; Future  -     Protime-INR; Future  -     CBC (No Diff); Future  -     Hepatic Function Panel; Future  -     Protime-INR; Future    3. Nausea and vomiting in adult  Comments:  resolved    4. Chronic gastritis without bleeding, unspecified gastritis type    5. Abnormal results of liver function studies   -     Protime-INR; Future    6. Class 2 severe obesity with serious comorbidity and body mass index (BMI) of 37.0 to 37.9 in adult, unspecified obesity type (HCC)    Other orders  -     pantoprazole (PROTONIX) 20 MG EC tablet; Take 1 tablet by mouth Daily.  Dispense: 30 tablet; Refill: 3            Follow Up   Return if symptoms worsen or fail to improve.   Discussed with patient long-term risk of PPI therapy, recommended decreasing dose today and patient was agreeable.  D/W pt ways to take Carafate - can dissolve, or in yogurt or applesauce  Try to take x 2 weeks  Discussed with patient importance of weight loss with 10% weight loss goal, low-carb diet reviewed, and the benefit of 2-4 c. Coffee/d discussed.  Written information given to patient today. Pt will think about doing Fibroscan. Pt gets yearly CT's for kidney, so liver also monitored w this.   Recheck LFTs 1  month    Patient was given instructions and counseling regarding her condition or for health maintenance advice. Please see specific information pulled into the AVS if appropriate.

## 2022-06-20 ENCOUNTER — TELEPHONE (OUTPATIENT)
Dept: GASTROENTEROLOGY | Facility: CLINIC | Age: 72
End: 2022-06-20

## 2022-06-22 ENCOUNTER — HOSPITAL ENCOUNTER (OUTPATIENT)
Dept: NUCLEAR MEDICINE | Facility: HOSPITAL | Age: 72
Discharge: HOME | End: 2022-06-22
Admitting: NURSE PRACTITIONER

## 2022-06-22 DIAGNOSIS — R11.2 NAUSEA AND VOMITING IN ADULT: ICD-10-CM

## 2022-06-22 PROCEDURE — 0 TECHNETIUM SULFUR COLLOID: Performed by: NURSE PRACTITIONER

## 2022-06-22 PROCEDURE — A9541 TC99M SULFUR COLLOID: HCPCS | Performed by: NURSE PRACTITIONER

## 2022-06-22 PROCEDURE — 78264 GASTRIC EMPTYING IMG STUDY: CPT

## 2022-06-22 RX ADMIN — TECHNETIUM TC 99M SULFUR COLLOID 1 DOSE: KIT at 08:35

## 2022-06-27 ENCOUNTER — HOSPITAL ENCOUNTER (OUTPATIENT)
Dept: ULTRASOUND IMAGING | Facility: HOSPITAL | Age: 72
Discharge: HOME | End: 2022-06-27

## 2022-06-27 DIAGNOSIS — N83.202 CYST OF LEFT OVARY: ICD-10-CM

## 2022-06-27 PROCEDURE — 76830 TRANSVAGINAL US NON-OB: CPT

## 2022-06-27 PROCEDURE — 76536 US EXAM OF HEAD AND NECK: CPT

## 2022-07-12 ENCOUNTER — OFFICE VISIT (OUTPATIENT)
Dept: GASTROENTEROLOGY | Facility: CLINIC | Age: 72
End: 2022-07-12

## 2022-07-12 VITALS
SYSTOLIC BLOOD PRESSURE: 105 MMHG | HEIGHT: 67 IN | WEIGHT: 234.8 LBS | BODY MASS INDEX: 36.85 KG/M2 | DIASTOLIC BLOOD PRESSURE: 45 MMHG | HEART RATE: 79 BPM

## 2022-07-12 DIAGNOSIS — K58.0 IRRITABLE BOWEL SYNDROME WITH DIARRHEA: ICD-10-CM

## 2022-07-12 DIAGNOSIS — R19.7 DIARRHEA, UNSPECIFIED TYPE: Primary | ICD-10-CM

## 2022-07-12 DIAGNOSIS — K76.0 FATTY LIVER: ICD-10-CM

## 2022-07-12 DIAGNOSIS — R79.89 ELEVATED LFTS: ICD-10-CM

## 2022-07-12 PROCEDURE — 99214 OFFICE O/P EST MOD 30 MIN: CPT | Performed by: NURSE PRACTITIONER

## 2022-07-12 RX ORDER — HYDROCORTISONE 25 MG/G
CREAM TOPICAL 2 TIMES DAILY
Qty: 28 EACH | Refills: 1 | Status: SHIPPED | OUTPATIENT
Start: 2022-07-12 | End: 2022-07-26

## 2022-07-12 RX ORDER — DICYCLOMINE HCL 20 MG
20 TABLET ORAL 3 TIMES DAILY PRN
Qty: 90 TABLET | Refills: 1 | Status: SHIPPED | OUTPATIENT
Start: 2022-07-12 | End: 2022-08-03

## 2022-07-12 NOTE — PROGRESS NOTES
Patient Name: Sowmya Hodges   Visit Date: 07/12/2022   Patient ID: [id number]  Provider: LORENZO Paz    Sex: female  Location:  Location Address:  Location Phone: [phone]  [place] [address] [phone]    YOB: 1950  Age: 71 y.o.   Primary Care Provider Romi Rubio MD      Referring Provider: No ref. provider found        Chief Complaint  Fatty Liver  (Follow up ), Diarrhea (Pt states most days of the week, pt states since stopping the sucralfate, symptoms have worsened), Rectal Pain (Pt states rectum is sore wiping, pt does have hemorrhoids, pt states cream sometimes helps.  ), and Abdominal Pain (Lower center ABD pain )    History of Present Illness    Patient initially presented in 2017 with abdominal pain and heartburn.  GES December 2017 within normal limits.  PMH significant for renal cell carcinoma 2014 status post partial right nephrectomy, follows w DR Butts.  History of fatty liver seen on CT scan, hepatic work-up has been completed 4/2020 and negative.  Patient did report a cousin with cirrhosis and was told he had a genetic issue      Last Colonoscopy (w EGD) 12/22/2020: Normal esophagus, erosive gastritis--biopsy with gastropathy, normal duodenum--biopsy with duodenitis; good prep, normal colonoscopy, 3 small adenomatous polyps in the ascending colon completely removed, random colon biopsies negative     February 2/28/22: Xifaxan improved diarrhea.  Esophagram was ordered for dysphagia- pt canceled d/t sx resolved, FibroScan ordered to better assess fatty liver-patient canceled this test     CT of the abdomen and pelvis done 5/19/2022: Negative other than fatty liver     EGD for persistent nausea vomiting 6/9/2022-esophagus normal, gastritis noted-biopsy with mild chronic inactive gastritis, normal duodenum, Carafate was given     Patient was last seen 6/16/2022, reported intentionally losing 7 pounds since February.  Her GI symptoms of abdominal  pain with N/V had resolved w Carafate.  Her only complaint of dysphagia was with pills.  Recommended to recheck LFTs and continue to lose weight for fatty liver-6/16/2022: INR normal at 1.1, hepatic function panel ALT elevated at 69, AST 59-order in to repeat in September    Pt states she took Carafate for 2 weeks, when she stopped she had diarrhea - 4 x day, reports 2 episodes of FI in public. C/o sore hemorrhoid and OTC treatments  helped, has seen blood w wiping x 1. Has had some abd cramping. [place] #6-7  No N/V. No upper GI c/o.   Past Medical History:   Diagnosis Date   • Anemia    • Anxiety    • Arthritis    • Constipation    • Depression    • Diabetes (HCC)    • Diverticulitis    • Epilepsy (HCC)    • Fracture of distal fibula 09/26/2017    right   • GERD (gastroesophageal reflux disease)    • Heart disease    • HTN (hypertension)    • Hyperlipidemia    • Limb swelling    • Lower abdominal pain    • TAYLOR (obstructive sleep apnea)    • Polyp, sinus maxillary    • Renal cell adenocarcinoma, right (HCC) 12/10/2014   • Seasonal allergies    • Shortness of breath        Past Surgical History:   Procedure Laterality Date   • CARDIAC SURGERY      open heart at age 4    • CARDIAC VALVE SURGERY     • CHOLECYSTECTOMY     • COLONOSCOPY  04/12/2017    dr monique   • ENDOSCOPY  04/12/2017    dr monique   • ENDOSCOPY N/A 6/9/2022    Procedure: ESOPHAGOGASTRODUODENOSCOPY with biopsy;  Surgeon: Sean Monique MD;  Location: [facility] ENDOSCOPY;  Service: Gastroenterology;  Laterality: N/A;  gastritis   • EYE SURGERY      implant   • NEPHRECTOMY PARTIAL Right 12/02/2014    robotic right partial nephrectomy w dr garvey   • OTHER SURGICAL HISTORY      joint surgery   • SALIVARY GLAND SURGERY     • TONSILLECTOMY     • TOTAL KNEE ARTHROPLASTY Right    • UPPER GASTROINTESTINAL ENDOSCOPY  12/22/2020    Dr. Monique   • WRIST SURGERY Left        Allergies   Allergen Reactions   • Codeine Rash       Family History   Problem Relation  "Age of Onset   • Arthritis Mother    • Arthritis Father    • Cancer Father    • Arthritis Sister    • Arthritis Brother    • Colon cancer Neg Hx         Social History     Tobacco Use   • Smoking status: Former Smoker     Packs/day: 1.50     Years: 4.00     Pack years: 6.00   • Smokeless tobacco: Never Used   Vaping Use   • Vaping Use: Never used   Substance Use Topics   • Alcohol use: Never     Comment: drinks less than 1 drink per day    • Drug use: Never       Objective     Vital Signs:   /45 (BP Location: Left arm, Patient Position: Sitting, Cuff Size: Adult)   Pulse 79   Ht 170.2 cm (67\")   Wt 107 kg (234 lb 12.8 oz)   BMI 36.77 kg/m²       Physical Exam  Constitutional:       General: The patient is not in acute distress.     Appearance: Normal appearance.   HENT:      Head: Normocephalic and atraumatic.      Nose: Nose normal.   Pulmonary:      Effort: Pulmonary effort is normal. No respiratory distress.   Abdominal:      General: Abdomen is flat.      Palpations: Abdomen is soft. There is no mass.      Tenderness: There is no abdominal tenderness. There is no guarding.   Musculoskeletal:      Cervical back: Neck supple.      Right lower leg: No edema.      Left lower leg: No edema.   Skin:     General: Skin is warm and dry.   Neurological:      General: No focal deficit present.      Mental Status: The patient is alert and oriented to person, place, and time.      Gait: Gait normal.   Psychiatric:         Mood and Affect: Mood normal.         Speech: Speech normal.         Behavior: Behavior normal.         Thought Content: Thought content normal.     Result Review :   The following data was reviewed by: LORENZO Paz on 07/12/2022:                    Assessment and Plan    Diagnoses and all orders for this visit:    1. Diarrhea, unspecified type (Primary)  -     Enteric Bacterial Panel - Stool, Per Rectum; Future  -     Enteric Parasite Panel - Stool, Per Rectum; Future  -     " Fecal Lactoferrin Qual. - Stool, Per Rectum; Future  -     Clostridium Difficile Toxin - Stool, Per Rectum; Future    2. Irritable bowel syndrome with diarrhea    3. Elevated LFTs    4. Fatty liver    Other orders  -     Hydrocortisone, Perianal, (ANUSOL-HC) 2.5 % rectal cream; Insert  into the rectum 2 (Two) Times a Day for 14 days.  Dispense: 28 each; Refill: 1  -     dicyclomine (BENTYL) 20 MG tablet; Take 1 tablet by mouth 3 (Three) Times a Day As Needed (abd pain or cramping). Take 30 min before meal  Dispense: 90 tablet; Refill: 1            Follow Up   Return for next available.   Check stools   Restart Dicyclomine   Pt would like to take Xifaxan again- she states she has some at home from before - she will call if she does not have 2 week supply - await to know stools are negative first  We will give Anusol cream for hemorrhoid complaint, explained to patient if any further blood occurrs or persistent diarrhea patient will need a colonoscopy and she verbalized understanding    Patient was given instructions and counseling regarding her condition or for health maintenance advice. Please see specific information pulled into the AVS if appropriate.

## 2022-07-12 NOTE — PATIENT INSTRUCTIONS
Xifaxan trial - 3 x day for 14 days --DO NOT START UNTIL STOOL STUDIES COMPLETED/NEGATIVE. Call for results     Bentyl/Dicyclomine -- take as needed 3-4 x day

## 2022-07-13 ENCOUNTER — LAB (OUTPATIENT)
Dept: LAB | Facility: HOSPITAL | Age: 72
End: 2022-07-13

## 2022-07-13 DIAGNOSIS — R19.7 DIARRHEA, UNSPECIFIED TYPE: ICD-10-CM

## 2022-07-13 LAB
027 TOXIN: NORMAL
C DIFF TOX GENS STL QL NAA+PROBE: NEGATIVE
LACTOFERRIN STL QL LA: NEGATIVE

## 2022-07-13 PROCEDURE — 87506 IADNA-DNA/RNA PROBE TQ 6-11: CPT

## 2022-07-13 PROCEDURE — 83630 LACTOFERRIN FECAL (QUAL): CPT

## 2022-07-13 PROCEDURE — 87493 C DIFF AMPLIFIED PROBE: CPT

## 2022-07-14 ENCOUNTER — TELEPHONE (OUTPATIENT)
Dept: GASTROENTEROLOGY | Facility: CLINIC | Age: 72
End: 2022-07-14

## 2022-07-14 LAB
C COLI+JEJ+UPSA DNA STL QL NAA+NON-PROBE: NOT DETECTED
CRYPTOSP DNA STL QL NAA+NON-PROBE: NOT DETECTED
E HISTOLYT DNA STL QL NAA+NON-PROBE: NOT DETECTED
EC STX1+STX2 GENES STL QL NAA+NON-PROBE: NOT DETECTED
G LAMBLIA DNA STL QL NAA+NON-PROBE: NOT DETECTED
S ENT+BONG DNA STL QL NAA+NON-PROBE: NOT DETECTED
SHIGELLA SP+EIEC IPAH ST NAA+NON-PROBE: NOT DETECTED

## 2022-08-03 RX ORDER — DICYCLOMINE HCL 20 MG
20 TABLET ORAL 3 TIMES DAILY PRN
Qty: 90 TABLET | Refills: 1 | Status: SHIPPED | OUTPATIENT
Start: 2022-08-03 | End: 2022-09-06 | Stop reason: SDUPTHER

## 2022-08-25 ENCOUNTER — LAB (OUTPATIENT)
Dept: LAB | Facility: HOSPITAL | Age: 72
End: 2022-08-25

## 2022-08-25 DIAGNOSIS — R94.5 ABNORMAL RESULTS OF LIVER FUNCTION STUDIES: ICD-10-CM

## 2022-08-25 DIAGNOSIS — R79.89 ELEVATED LFTS: ICD-10-CM

## 2022-08-25 LAB
ALBUMIN SERPL-MCNC: 4.1 G/DL (ref 3.5–5.2)
ALP SERPL-CCNC: 104 U/L (ref 39–117)
ALT SERPL W P-5'-P-CCNC: 52 U/L (ref 1–33)
AST SERPL-CCNC: 53 U/L (ref 1–32)
BILIRUB CONJ SERPL-MCNC: <0.2 MG/DL (ref 0–0.3)
BILIRUB INDIRECT SERPL-MCNC: ABNORMAL MG/DL
BILIRUB SERPL-MCNC: 0.4 MG/DL (ref 0–1.2)
DEPRECATED RDW RBC AUTO: 45 FL (ref 37–54)
ERYTHROCYTE [DISTWIDTH] IN BLOOD BY AUTOMATED COUNT: 13.5 % (ref 12.3–15.4)
HCT VFR BLD AUTO: 37.9 % (ref 34–46.6)
HGB BLD-MCNC: 12.5 G/DL (ref 12–15.9)
INR PPP: 0.96 (ref 0.86–1.15)
MCH RBC QN AUTO: 30.3 PG (ref 26.6–33)
MCHC RBC AUTO-ENTMCNC: 33 G/DL (ref 31.5–35.7)
MCV RBC AUTO: 92 FL (ref 79–97)
PLATELET # BLD AUTO: 241 10*3/MM3 (ref 140–450)
PMV BLD AUTO: 9.3 FL (ref 6–12)
PROT SERPL-MCNC: 7 G/DL (ref 6–8.5)
PROTHROMBIN TIME: 12.9 SECONDS (ref 11.8–14.9)
RBC # BLD AUTO: 4.12 10*6/MM3 (ref 3.77–5.28)
WBC NRBC COR # BLD: 4.51 10*3/MM3 (ref 3.4–10.8)

## 2022-08-25 PROCEDURE — 36415 COLL VENOUS BLD VENIPUNCTURE: CPT

## 2022-08-25 PROCEDURE — 85610 PROTHROMBIN TIME: CPT

## 2022-08-25 PROCEDURE — 85027 COMPLETE CBC AUTOMATED: CPT

## 2022-08-25 PROCEDURE — 80076 HEPATIC FUNCTION PANEL: CPT

## 2022-08-30 ENCOUNTER — OFFICE VISIT (OUTPATIENT)
Dept: GASTROENTEROLOGY | Facility: CLINIC | Age: 72
End: 2022-08-30

## 2022-08-30 VITALS
BODY MASS INDEX: 37.51 KG/M2 | HEIGHT: 67 IN | HEART RATE: 69 BPM | WEIGHT: 239 LBS | SYSTOLIC BLOOD PRESSURE: 109 MMHG | DIASTOLIC BLOOD PRESSURE: 54 MMHG

## 2022-08-30 DIAGNOSIS — K76.0 FATTY LIVER: ICD-10-CM

## 2022-08-30 DIAGNOSIS — K58.0 IRRITABLE BOWEL SYNDROME WITH DIARRHEA: Primary | ICD-10-CM

## 2022-08-30 DIAGNOSIS — R79.89 ELEVATED LFTS: ICD-10-CM

## 2022-08-30 DIAGNOSIS — E66.01 CLASS 2 SEVERE OBESITY WITH SERIOUS COMORBIDITY AND BODY MASS INDEX (BMI) OF 37.0 TO 37.9 IN ADULT, UNSPECIFIED OBESITY TYPE: ICD-10-CM

## 2022-08-30 PROCEDURE — 99213 OFFICE O/P EST LOW 20 MIN: CPT | Performed by: NURSE PRACTITIONER

## 2022-08-30 RX ORDER — PANTOPRAZOLE SODIUM 20 MG/1
TABLET, DELAYED RELEASE ORAL
Qty: 90 TABLET | Refills: 1 | Status: SHIPPED | OUTPATIENT
Start: 2022-08-30 | End: 2022-12-19 | Stop reason: SDUPTHER

## 2022-08-30 NOTE — PROGRESS NOTES
Patient Name: Sowmya Hodgse   Visit Date: 08/30/2022   Patient ID: [id number]  Provider: LORENZO Paz    Sex: female  Location:  Location Address:  Location Phone: [address]  [place] [address] [phone]    YOB: 1950  Age: 72 y.o.   Primary Care Provider Romi Rubio MD      Referring Provider: No ref. provider found        Chief Complaint  Diarrhea (Pt states is not as bad, every now and then ), Fatty Liver  (Follow up ), and Abdominal Pain (Pt states still occ lower ABD pain )    History of Present Illness  Patient initially presented in 2017 with abdominal pain and heartburn.  GES December 2017 within normal limits.  PMH significant for renal cell carcinoma 2014 status post partial right nephrectomy, follows w DR Butts.  History of fatty liver seen on CT scan, hepatic work-up has been completed 4/2020 and negative.  Patient did report a cousin with cirrhosis and was told he had a genetic issue      Last Colonoscopy (w EGD) 12/22/2020: Normal esophagus, erosive gastritis--biopsy with gastropathy, normal duodenum--biopsy with duodenitis; good prep, normal colonoscopy, 3 small adenomatous polyps in the ascending colon completely removed, random colon biopsies negative     History of intermittent diarrhea-Xifaxan improved symptoms February 2022    CT of the abdomen and pelvis done 5/19/2022: Negative other than fatty liver     EGD for persistent nausea vomiting 6/9/2022-esophagus normal, gastritis noted-biopsy with mild chronic inactive gastritis, normal duodenum, Carafate was given     Patient was last seen July 2022 reported having some diarrhea once she stopped Carafate, had seen blood with wiping x1.  Some abdominal cramping nausea and vomiting resolved with Carafate.  Patient was advised to repeat Xifaxan course as she thought she had a refill at home, Anusol cream was given  7/13/2022: C. difficile negative, culture negative, Giardia crypto negative,  lactoferrin negative  8/25/2022: INR 0.96, ALT 52, AST 53, bilirubin normal, albumin normal, CBC unremarkable    Today, pt Denies FI, pt did not take Xifaxan after last visit as she did not have any more, but averaging 1 stool/day, takes Bentyl PRN and this has worked well. No rectal bleeding.   No ETOH  Pt is trying to lsoe weight, she has gained a few pounds since last visit. Feels Ozempic has helped her lose weight overall, she is planning to start walking 2 x day.   Off carafate, no further N/V  Past Medical History:   Diagnosis Date   • Anemia    • Anxiety    • Arthritis    • Constipation    • Depression    • Diabetes (HCC)    • Diverticulitis    • Epilepsy (HCC)    • Fracture of distal fibula 09/26/2017    right   • GERD (gastroesophageal reflux disease)    • Heart disease    • HTN (hypertension)    • Hyperlipidemia    • Limb swelling    • Lower abdominal pain    • TAYLOR (obstructive sleep apnea)    • Polyp, sinus maxillary    • Renal cell adenocarcinoma, right (HCC) 12/10/2014   • Seasonal allergies    • Shortness of breath        Past Surgical History:   Procedure Laterality Date   • CARDIAC SURGERY      open heart at age 4    • CARDIAC VALVE SURGERY     • CHOLECYSTECTOMY     • COLONOSCOPY  04/12/2017    dr monique   • ENDOSCOPY  04/12/2017    dr monique   • ENDOSCOPY N/A 6/9/2022    Procedure: ESOPHAGOGASTRODUODENOSCOPY with biopsy;  Surgeon: Sean Monique MD;  Location: [facility] ENDOSCOPY;  Service: Gastroenterology;  Laterality: N/A;  gastritis   • EYE SURGERY      implant   • NEPHRECTOMY PARTIAL Right 12/02/2014    robotic right partial nephrectomy w dr garvey   • OTHER SURGICAL HISTORY      joint surgery   • SALIVARY GLAND SURGERY     • TONSILLECTOMY     • TOTAL KNEE ARTHROPLASTY Right    • UPPER GASTROINTESTINAL ENDOSCOPY  12/22/2020    Dr. Monique   • WRIST SURGERY Left        Allergies   Allergen Reactions   • Codeine Rash       Family History   Problem Relation Age of Onset   • Arthritis Mother    •  "Arthritis Father    • Cancer Father    • Arthritis Sister    • Arthritis Brother    • Colon cancer Neg Hx         Social History     Tobacco Use   • Smoking status: Former Smoker     Packs/day: 1.50     Years: 4.00     Pack years: 6.00   • Smokeless tobacco: Never Used   Vaping Use   • Vaping Use: Never used   Substance Use Topics   • Alcohol use: Never     Comment: drinks less than 1 drink per day    • Drug use: Never       Objective     Vital Signs:   /54 (BP Location: Left arm, Patient Position: Sitting, Cuff Size: Adult)   Pulse 69   Ht 170.2 cm (67\")   Wt 108 kg (239 lb)   BMI 37.43 kg/m²       Physical Exam  Constitutional:       General: The patient is not in acute distress.     Appearance: Normal appearance.   HENT:      Head: Normocephalic and atraumatic.      Nose: Nose normal.   Pulmonary:      Effort: Pulmonary effort is normal. No respiratory distress.   Abdominal:      General: Abdomen is flat.      Palpations: Abdomen is soft. There is no mass.      Tenderness: There is no abdominal tenderness-- occasional lower abd pain. There is no guarding.   Musculoskeletal:      Cervical back: Neck supple.      Right lower leg: No edema.      Left lower leg: No edema.   Skin:     General: Skin is warm and dry.   Neurological:      General: No focal deficit present.      Mental Status: The patient is alert and oriented to person, place, and time.      Gait: Gait normal.   Psychiatric:         Mood and Affect: Mood normal.         Speech: Speech normal.         Behavior: Behavior normal.         Thought Content: Thought content normal.     Result Review :   The following data was reviewed by: LORENZO Paz on 08/30/2022:    CBC w/diff    CBC w/Diff 3/23/22 6/1/22 8/25/22   WBC 4.19 5.69 4.51   RBC 4.50 4.39 4.12   Hemoglobin 13.5 13.2 12.5   Hematocrit 40.1 39.7 37.9   MCV 89.1 90.4 92.0   MCH 30.0 30.1 30.3   MCHC 33.7 33.2 33.0   RDW 13.0 13.1 13.5   Platelets 309 295 241   Neutrophil " Rel %  61.8    Immature Granulocyte Rel %  0.4    Lymphocyte Rel %  29.9    Monocyte Rel %  6.2    Eosinophil Rel %  1.2    Basophil Rel %  0.5            CMP    CMP 6/1/22 6/16/22 8/25/22   Glucose 182 (A)     BUN 11     Creatinine 0.76     Sodium 139     Potassium 4.2     Chloride 104     Calcium 9.7     Albumin 4.50 3.90 4.10   Total Bilirubin 0.3 0.4 0.4   Alkaline Phosphatase 99 98 104   AST (SGOT) 69 (A) 59 (A) 53 (A)   ALT (SGPT) 72 (A) 69 (A) 52 (A)   (A) Abnormal value                          Assessment and Plan    Diagnoses and all orders for this visit:    1. Irritable bowel syndrome with diarrhea (Primary)  Comments:  sx are currently stable    2. Fatty liver  -     US Liver; Future    3. Elevated LFTs  -     US Liver; Future    4. Class 2 severe obesity with serious comorbidity and body mass index (BMI) of 37.0 to 37.9 in adult, unspecified obesity type (HCC)            Follow Up   Return in about 6 months (around 2/28/2023).   Liver US November   Cont w low carb diet and exercise  Cont Bentyl as needed    Patient was given instructions and counseling regarding her condition or for health maintenance advice. Please see specific information pulled into the AVS if appropriate.

## 2022-09-06 RX ORDER — DICYCLOMINE HCL 20 MG
TABLET ORAL
Qty: 90 TABLET | Refills: 1 | Status: SHIPPED | OUTPATIENT
Start: 2022-09-06 | End: 2022-11-09

## 2022-09-06 NOTE — TELEPHONE ENCOUNTER
Rx request Bentyl 20mg tablet    Last OV: 8.30.22  Last Rx'd: 8.3.22    Pt has follow up on 2.28.22

## 2022-09-21 ENCOUNTER — HOSPITAL ENCOUNTER (OUTPATIENT)
Dept: ULTRASOUND IMAGING | Facility: HOSPITAL | Age: 72
Discharge: HOME | End: 2022-09-21
Admitting: NURSE PRACTITIONER

## 2022-09-21 DIAGNOSIS — K76.0 FATTY LIVER: ICD-10-CM

## 2022-09-21 DIAGNOSIS — R79.89 ELEVATED LFTS: ICD-10-CM

## 2022-09-21 PROCEDURE — 76705 ECHO EXAM OF ABDOMEN: CPT

## 2022-09-22 RX ORDER — LISINOPRIL 2.5 MG/1
TABLET ORAL
Qty: 90 TABLET | Refills: 1 | Status: SHIPPED | OUTPATIENT
Start: 2022-09-22 | End: 2022-12-19 | Stop reason: SDUPTHER

## 2022-10-03 ENCOUNTER — HOSPITAL ENCOUNTER (OUTPATIENT)
Dept: MAMMOGRAPHY | Facility: HOSPITAL | Age: 72
Discharge: HOME | End: 2022-10-03
Admitting: NURSE PRACTITIONER

## 2022-10-03 ENCOUNTER — TELEPHONE (OUTPATIENT)
Dept: INTERNAL MEDICINE | Facility: CLINIC | Age: 72
End: 2022-10-03

## 2022-10-03 DIAGNOSIS — Z12.31 VISIT FOR SCREENING MAMMOGRAM: ICD-10-CM

## 2022-10-03 PROCEDURE — 77067 SCR MAMMO BI INCL CAD: CPT

## 2022-10-03 PROCEDURE — 77063 BREAST TOMOSYNTHESIS BI: CPT

## 2022-10-03 NOTE — TELEPHONE ENCOUNTER
Caller: Sowmya Hodges    Relationship: Self    Best call back number: [phone]    Requested Prescriptions:   Requested Prescriptions     Pending Prescriptions Disp Refills   • levETIRAcetam (KEPPRA) 750 MG tablet 180 tablet 0     Sig: Take 1 tablet by mouth 2 (Two) Times a Day.   • levocetirizine (XYZAL) 5 MG tablet 90 tablet 1     Sig: Take 1 tablet by mouth Every Evening.   • atorvastatin (LIPITOR) 20 MG tablet 90 tablet 1     Sig: Take 1 tablet by mouth Daily.   • escitalopram (LEXAPRO) 20 MG tablet 90 tablet 1     Sig: Take 1 tablet by mouth Daily.   ACCUCHECK STRIPS AND NEEDLES      Pharmacy where request should be sent: [facility]/PHARMACY #[id number] - [place], [place] - [address] [facility] [phone] [facility] [phone] FX     Additional details provided by patient: PATIENT STATES SHE NEEDS NEW ORDERS FOR THE levocetirizine /atorvastatin / escitalopram     PATIENT NEEDS THE KEPPRA AND STRIPS/NEEDLES CALLED IN TODAY 10.3.22    Does the patient have less than a 3 day supply:  [x] Yes  [] No    Mounika Brown Rep   10/03/22 13:01 EDT            OPERATIVE REPORT  PATIENT NAME: Paresh Brar    :  1963  MRN: [id number]  Pt Location: [facility] ROOM 01    SURGERY DATE: 2018    Surgeon(s) and Role:     * Elizabeth Curry MD - Primary     * Margie Mariscal MD - Assisting    Preop Diagnosis:  Bariatric surgery status [Z98 84]    Post-Op Diagnosis Codes: * Bariatric surgery status [Z98 84]    Procedure(s) (LRB):  ESOPHAGOGASTRODUODENOSCOPY (EGD) (N/A)    Specimen(s):  * No specimens in log *    Estimated Blood Loss:   Minimal    Drains:       Anesthesia Type:   IV Sedation with Anesthesia    Operative Indications:  Bariatric surgery status [Z98 84]      Operative Findings:  Non excluded gastric fundus  Hiatal hernia  Mild inflammation of the gastrojejunostomy anastomosis    Complications:   None    Procedure and Technique:  Upper endoscopy   I was present for the entire procedure    Patient Disposition:  hemodynamically stable     Indication:   Patient suffers from morbid obesity s/p RYGB presenting with weight regain    Description of the procedure: The patient was brought to the endoscopy suite and was placed in  left lateral decubitus position  A time-out was called and the patient was identified by name and by armband  The patient received IV  Propofol under closed monitoring  by the anesthesiologist and nurse anesthesist     Upper endoscopy was performed under direct visualization  Esophagus was visualized and showed normal findings   The GE junction showed a small hernia   The stomach pouch was then inspected and showed a large non excluded gastric fundus  Gastrojejunostomy was inspected and showed mild inflammation    Blind end and Albert limbs were both inspected and showed normal findings    Gastro-gastric Fistula was not identified      The patient tolerated the procedure well and was transferred to the recovery unit in stable condition           SIGNATURE: Elizabeth Curry MD  DATE: 2018  TIME: 8:37 AM

## 2022-10-11 RX ORDER — ESCITALOPRAM OXALATE 20 MG/1
TABLET ORAL
Qty: 90 TABLET | Refills: 1 | Status: SHIPPED | OUTPATIENT
Start: 2022-10-11 | End: 2023-01-10

## 2022-10-19 RX ORDER — LEVOCETIRIZINE DIHYDROCHLORIDE 5 MG/1
5 TABLET, FILM COATED ORAL EVERY EVENING
Qty: 90 TABLET | Refills: 1 | Status: SHIPPED | OUTPATIENT
Start: 2022-10-19 | End: 2022-12-19 | Stop reason: SDUPTHER

## 2022-10-19 RX ORDER — ESCITALOPRAM OXALATE 20 MG/1
20 TABLET ORAL DAILY
Qty: 90 TABLET | Refills: 1 | Status: SHIPPED | OUTPATIENT
Start: 2022-10-19

## 2022-10-19 RX ORDER — PEN NEEDLE, DIABETIC 30 GX3/16"
1 NEEDLE, DISPOSABLE MISCELLANEOUS WEEKLY
Qty: 90 EACH | Refills: 1 | Status: SHIPPED | OUTPATIENT
Start: 2022-10-19

## 2022-10-19 RX ORDER — LEVETIRACETAM 750 MG/1
750 TABLET ORAL 2 TIMES DAILY
Qty: 180 TABLET | Refills: 1 | Status: SHIPPED | OUTPATIENT
Start: 2022-10-19 | End: 2023-04-04

## 2022-10-19 RX ORDER — ATORVASTATIN CALCIUM 20 MG/1
20 TABLET, FILM COATED ORAL DAILY
Qty: 90 TABLET | Refills: 1 | Status: SHIPPED | OUTPATIENT
Start: 2022-10-19 | End: 2023-03-03

## 2022-10-24 ENCOUNTER — TELEPHONE (OUTPATIENT)
Dept: INTERNAL MEDICINE | Facility: CLINIC | Age: 72
End: 2022-10-24

## 2022-10-24 NOTE — TELEPHONE ENCOUNTER
Caller: Sowmya Hodges    Relationship to patient: Self    Best call back number: [phone]    Patient is needing: PATIENT WENT TO URGENT CARE AND WAS REFERRED TO AN OTOLARYNGOLOGIST. SHE STATED THAT SHE TRIED TO CANCEL HER APPOINTMENT FOR THIS MORNING BUT THE PHONE DID NOT DO THAT CORRECTLY.

## 2022-10-25 DIAGNOSIS — F41.9 ANXIETY: ICD-10-CM

## 2022-11-02 DIAGNOSIS — F41.9 ANXIETY: ICD-10-CM

## 2022-11-02 RX ORDER — BUSPIRONE HYDROCHLORIDE 15 MG/1
15 TABLET ORAL 3 TIMES DAILY PRN
Qty: 90 TABLET | Refills: 0 | Status: SHIPPED | OUTPATIENT
Start: 2022-11-02 | End: 2022-11-22 | Stop reason: SDUPTHER

## 2022-11-02 RX ORDER — FLUTICASONE PROPIONATE 50 MCG
SPRAY, SUSPENSION (ML) NASAL
Qty: 48 ML | Refills: 1 | Status: SHIPPED | OUTPATIENT
Start: 2022-11-02

## 2022-11-02 NOTE — TELEPHONE ENCOUNTER
Caller: Sowmya Hodges    Relationship: Self    Best call back number: [phone]    Requested Prescriptions:   Requested Prescriptions     Pending Prescriptions Disp Refills   • clonazePAM (KlonoPIN) 1 MG tablet 60 tablet 2     Sig: Take 1 tablet by mouth 2 (Two) Times a Day As Needed for Anxiety.      Pharmacy where request should be sent: [facility]/PHARMACY #[id number] - [place], [place] - [address] [facility] [phone] [facility] [phone] FX     Does the patient have less than a 3 day supply:  [] Yes  [x] No    Mounika Pate Rep   11/02/22 16:57 EDT

## 2022-11-05 NOTE — TELEPHONE ENCOUNTER
Last follow up visit date: 6/1/22    Last urine drug screen date: 6/3/22    Last consent/contract date:1/10/22    Last fill date, if filled at [facility]--(must call [phone] option 3) to obtain: 6/1/22

## 2022-11-09 ENCOUNTER — PREP FOR SURGERY (OUTPATIENT)
Dept: OTHER | Facility: HOSPITAL | Age: 72
End: 2022-11-09

## 2022-11-09 ENCOUNTER — OFFICE VISIT (OUTPATIENT)
Dept: GASTROENTEROLOGY | Facility: CLINIC | Age: 72
End: 2022-11-09

## 2022-11-09 VITALS
DIASTOLIC BLOOD PRESSURE: 62 MMHG | BODY MASS INDEX: 36.85 KG/M2 | WEIGHT: 234.8 LBS | SYSTOLIC BLOOD PRESSURE: 110 MMHG | HEIGHT: 67 IN | HEART RATE: 77 BPM

## 2022-11-09 DIAGNOSIS — K58.0 IRRITABLE BOWEL SYNDROME WITH DIARRHEA: Primary | ICD-10-CM

## 2022-11-09 DIAGNOSIS — K76.0 FATTY LIVER: ICD-10-CM

## 2022-11-09 DIAGNOSIS — R10.9 ABDOMINAL CRAMPS: Primary | ICD-10-CM

## 2022-11-09 DIAGNOSIS — R10.9 ABDOMINAL CRAMPS: ICD-10-CM

## 2022-11-09 DIAGNOSIS — R79.89 ELEVATED LFTS: ICD-10-CM

## 2022-11-09 DIAGNOSIS — R19.7 DIARRHEA, UNSPECIFIED TYPE: ICD-10-CM

## 2022-11-09 PROCEDURE — 99214 OFFICE O/P EST MOD 30 MIN: CPT | Performed by: NURSE PRACTITIONER

## 2022-11-09 RX ORDER — CLONAZEPAM 1 MG/1
1 TABLET ORAL 2 TIMES DAILY PRN
Qty: 60 TABLET | Refills: 2 | Status: SHIPPED | OUTPATIENT
Start: 2022-11-09

## 2022-11-09 RX ORDER — POLYETHYLENE GLYCOL 3350, SODIUM SULFATE ANHYDROUS, SODIUM BICARBONATE, SODIUM CHLORIDE, POTASSIUM CHLORIDE 227.1; 21.5; 6.36; 5.53; .754 G/L; G/L; G/L; G/L; G/L
4 POWDER, FOR SOLUTION ORAL DAILY
Qty: 1 EACH | Refills: 0 | Status: SHIPPED | OUTPATIENT
Start: 2022-11-09 | End: 2022-12-09

## 2022-11-09 NOTE — PROGRESS NOTES
Patient Name: Sowmya Hodges   Visit Date: 11/09/2022   Patient ID: [id number]  Provider: LORENZO Paz    Sex: female  Location:  Location Address:  Location Phone: [address] [place] [address] [phone]    YOB: 1950  Age: 72 y.o.   Primary Care Provider Romi Rubio MD      Referring Provider: No ref. provider found        Chief Complaint  Diarrhea (Pt states most Bms. 3-4 Bms ) and Abdominal Pain (Lower center ABD pain most of the day )    History of Present Illness    Patient initially presented in 2017 with abdominal pain and heartburn.  GES December 2017 within normal limits.  PMH significant for renal cell carcinoma 2014 status post partial right nephrectomy, follows w DR Butts.  History of fatty liver seen on CT scan, hepatic work-up has been completed 4/2020 and negative.  Patient did report a cousin with cirrhosis and was told he had a genetic issue      Last Colonoscopy (w EGD) 12/22/2020: Normal esophagus, erosive gastritis--biopsy with gastropathy, normal duodenum--biopsy with duodenitis; good prep, normal colonoscopy, 3 small adenomatous polyps in the ascending colon completely removed, random colon biopsies negative     History of intermittent diarrhea-Xifaxan improved symptoms February 2022     CT of the abdomen and pelvis done 5/19/2022: Negative other than fatty liver     EGD for persistent nausea vomiting 6/9/2022-esophagus normal, gastritis noted-biopsy with mild chronic inactive gastritis, normal duodenum, Carafate was given   7/13/2022: C. difficile negative, culture negative, Giardia crypto negative, lactoferrin negative  8/25/2022: INR 0.96, ALT 52, AST 53, bilirubin normal, albumin normal, CBC unremarkable     Patient was last seen 8/30/2022, was averaging 1 stool a day taking Bentyl as needed, symptoms were stable  Last liver ultrasound 9/21/2022: Fatty liver, otherwise negative    Pt states she has started having lower abd pain again, x  "1 month, +cramping. 2-3 stools/d QOD. Taking Bentyl 1-2 x day, sometimes helps, sometimes doesn't. Stools and sx not r/t meals. Pt states \"I never know when it's going to hit.\"  No fever.   No blood in stool   No N/V, no issues eating  Past Medical History:   Diagnosis Date   • Anemia    • Anxiety    • Arthritis    • Constipation    • Depression    • Diabetes (HCC)    • Diverticulitis    • Epilepsy (HCC)    • Fracture of distal fibula 09/26/2017    right   • GERD (gastroesophageal reflux disease)    • Heart disease    • HTN (hypertension)    • Hyperlipidemia    • Limb swelling    • Lower abdominal pain    • TAYLOR (obstructive sleep apnea)    • Polyp, sinus maxillary    • Renal cell adenocarcinoma, right (HCC) 12/10/2014   • Seasonal allergies    • Shortness of breath        Past Surgical History:   Procedure Laterality Date   • CARDIAC SURGERY      open heart at age 4    • CARDIAC VALVE SURGERY     • CHOLECYSTECTOMY     • COLONOSCOPY  04/12/2017    dr monique   • ENDOSCOPY  04/12/2017    dr monique   • ENDOSCOPY N/A 6/9/2022    Procedure: ESOPHAGOGASTRODUODENOSCOPY with biopsy;  Surgeon: Sean Monique MD;  Location: [facility] ENDOSCOPY;  Service: Gastroenterology;  Laterality: N/A;  gastritis   • EYE SURGERY      implant   • NEPHRECTOMY PARTIAL Right 12/02/2014    robotic right partial nephrectomy w dr garvey   • OTHER SURGICAL HISTORY      joint surgery   • SALIVARY GLAND SURGERY     • TONSILLECTOMY     • TOTAL KNEE ARTHROPLASTY Right    • UPPER GASTROINTESTINAL ENDOSCOPY  12/22/2020    Dr. Monique   • WRIST SURGERY Left        Allergies   Allergen Reactions   • Codeine Rash              Family History   Problem Relation Age of Onset   • Arthritis Mother    • Arthritis Father    • Cancer Father    • Arthritis Sister    • Arthritis Brother    • Colon cancer Neg Hx         Social History     Tobacco Use   • Smoking status: Former     Packs/day: 1.50     Years: 4.00     Pack years: 6.00     Types: Cigarettes   • " "Smokeless tobacco: Never   Vaping Use   • Vaping Use: Never used   Substance Use Topics   • Alcohol use: Never     Comment: drinks less than 1 drink per day    • Drug use: Never       Objective     Vital Signs:   /62 (BP Location: Left arm, Patient Position: Sitting, Cuff Size: Adult)   Pulse 77   Ht 170.2 cm (67\")   Wt 107 kg (234 lb 12.8 oz)   BMI 36.77 kg/m²       Physical Exam  Constitutional:       General: The patient is not in acute distress.     Appearance: Normal appearance.   HENT:      Head: Normocephalic and atraumatic.      Nose: Nose normal.   Pulmonary:      Effort: Pulmonary effort is normal. No respiratory distress.   Abdominal:      General: Abdomen is flat.      Palpations: Abdomen is soft. There is no mass.      Tenderness: There is no abdominal tenderness--mild lower abd tenderness. There is no guarding.   Musculoskeletal:      Cervical back: Neck supple.      Right lower leg: No edema.      Left lower leg: No edema.   Skin:     General: Skin is warm and dry.   Neurological:      General: No focal deficit present.      Mental Status: The patient is alert and oriented to person, place, and time.      Gait: Gait normal.   Psychiatric:         Mood and Affect: Mood normal.         Speech: Speech normal.         Behavior: Behavior normal.         Thought Content: Thought content normal.     Result Review :   The following data was reviewed by: LORENZO Paz on 11/09/2022:    CBC w/diff    CBC w/Diff 3/23/22 6/1/22 8/25/22   WBC 4.19 5.69 4.51   RBC 4.50 4.39 4.12   Hemoglobin 13.5 13.2 12.5   Hematocrit 40.1 39.7 37.9   MCV 89.1 90.4 92.0   MCH 30.0 30.1 30.3   MCHC 33.7 33.2 33.0   RDW 13.0 13.1 13.5   Platelets 309 295 241   Neutrophil Rel %  61.8    Immature Granulocyte Rel %  0.4    Lymphocyte Rel %  29.9    Monocyte Rel %  6.2    Eosinophil Rel %  1.2    Basophil Rel %  0.5            CMP    CMP 6/1/22 6/16/22 8/25/22   Glucose 182 (A)     BUN 11     Creatinine 0.76   "   Sodium 139     Potassium 4.2     Chloride 104     Calcium 9.7     Albumin 4.50 3.90 4.10   Total Bilirubin 0.3 0.4 0.4   Alkaline Phosphatase 99 98 104   AST (SGOT) 69 (A) 59 (A) 53 (A)   ALT (SGPT) 72 (A) 69 (A) 52 (A)   (A) Abnormal value                          Assessment and Plan    Diagnoses and all orders for this visit:    1. Irritable bowel syndrome with diarrhea (Primary)    2. Fatty liver    3. Abdominal cramps    4. Elevated LFTs    Other orders  -     riFAXIMin (Xifaxan) 550 MG tablet; Take 1 tablet by mouth Every 8 (Eight) Hours for 14 days.  Dispense: 42 tablet; Refill: 1  -     hyoscyamine (LEVSIN) 0.125 MG SL tablet; Take 1 tablet by mouth Every 4 (Four) Hours As Needed for Cramping or Diarrhea.  Dispense: 90 tablet; Refill: 1  -     PEG 3350-KCl-NaBcb-NaCl-NaSulf (Golytely) 227.1 g pack; Take 4 L by mouth Daily for 1 day. Take per office instructions  Dispense: 1 each; Refill: 0            Follow Up   Return for follow up after procedure.   Sent RF Xifaxan TID x 14 d   Hyoscyamine trial, D/C Bentyl.   D/t persistent, recurring sx, recommended Colonoscopy Surgical Risk and Benefits: Possible risks/complications, benefits, and alternatives to surgical or invasive procedure have been explained to patient and/or legal guardian; risks include bleeding, infection, and perforation. Patient has been evaluated and can tolerate anesthesia and/or sedation. Risks, benefits, and alternatives to anesthesia and sedation have been explained to patient and/or legal guardian.     Patient was given instructions and counseling regarding her condition or for health maintenance advice. Please see specific information pulled into the AVS if appropriate.

## 2022-11-22 DIAGNOSIS — F41.9 ANXIETY: ICD-10-CM

## 2022-11-23 RX ORDER — BUSPIRONE HYDROCHLORIDE 15 MG/1
15 TABLET ORAL 3 TIMES DAILY PRN
Qty: 270 TABLET | Refills: 0 | Status: SHIPPED | OUTPATIENT
Start: 2022-11-23

## 2022-12-09 ENCOUNTER — TELEPHONE (OUTPATIENT)
Dept: GASTROENTEROLOGY | Facility: CLINIC | Age: 72
End: 2022-12-09

## 2022-12-09 RX ORDER — POLYETHYLENE GLYCOL 3350, SODIUM SULFATE ANHYDROUS, SODIUM BICARBONATE, SODIUM CHLORIDE, POTASSIUM CHLORIDE 227.1; 21.5; 6.36; 5.53; .754 G/L; G/L; G/L; G/L; G/L
4 POWDER, FOR SOLUTION ORAL DAILY
Qty: 1 EACH | Refills: 0 | Status: SHIPPED | OUTPATIENT
Start: 2022-12-09 | End: 2022-12-10

## 2022-12-09 NOTE — TELEPHONE ENCOUNTER
----- Message from Mounika Barney Rep sent at 12/8/2022  3:37 PM EST -----  Regarding: PREP  PT called in to the office in regards to her prep. She stated the pharmacy had not received the order. I did confirm that it was sent to [facility] pharmacy - she would prefer it be sent to [facility] on [address]         Thanks!

## 2022-12-19 ENCOUNTER — OFFICE VISIT (OUTPATIENT)
Dept: INTERNAL MEDICINE | Facility: CLINIC | Age: 72
End: 2022-12-19

## 2022-12-19 VITALS
HEIGHT: 67 IN | HEART RATE: 74 BPM | BODY MASS INDEX: 37.35 KG/M2 | WEIGHT: 238 LBS | SYSTOLIC BLOOD PRESSURE: 116 MMHG | OXYGEN SATURATION: 98 % | DIASTOLIC BLOOD PRESSURE: 58 MMHG

## 2022-12-19 DIAGNOSIS — E78.5 HYPERLIPIDEMIA, UNSPECIFIED HYPERLIPIDEMIA TYPE: ICD-10-CM

## 2022-12-19 DIAGNOSIS — E11.65 TYPE 2 DIABETES MELLITUS WITH HYPERGLYCEMIA, WITHOUT LONG-TERM CURRENT USE OF INSULIN: ICD-10-CM

## 2022-12-19 DIAGNOSIS — Z00.00 ENCOUNTER FOR SUBSEQUENT ANNUAL WELLNESS VISIT (AWV) IN MEDICARE PATIENT: Primary | ICD-10-CM

## 2022-12-19 LAB
ALBUMIN SERPL-MCNC: 4.1 G/DL (ref 3.5–5.2)
ALBUMIN/GLOB SERPL: 1.6 G/DL
ALP SERPL-CCNC: 100 U/L (ref 39–117)
ALT SERPL W P-5'-P-CCNC: 38 U/L (ref 1–33)
ANION GAP SERPL CALCULATED.3IONS-SCNC: 6.3 MMOL/L (ref 5–15)
AST SERPL-CCNC: 34 U/L (ref 1–32)
BASOPHILS # BLD AUTO: 0.02 10*3/MM3 (ref 0–0.2)
BASOPHILS NFR BLD AUTO: 0.4 % (ref 0–1.5)
BILIRUB SERPL-MCNC: 0.4 MG/DL (ref 0–1.2)
BUN SERPL-MCNC: 15 MG/DL (ref 8–23)
BUN/CREAT SERPL: 19.2 (ref 7–25)
CALCIUM SPEC-SCNC: 9.3 MG/DL (ref 8.6–10.5)
CHLORIDE SERPL-SCNC: 106 MMOL/L (ref 98–107)
CHOLEST SERPL-MCNC: 153 MG/DL (ref 0–200)
CO2 SERPL-SCNC: 28.7 MMOL/L (ref 22–29)
CREAT SERPL-MCNC: 0.78 MG/DL (ref 0.57–1)
DEPRECATED RDW RBC AUTO: 44.5 FL (ref 37–54)
EGFRCR SERPLBLD CKD-EPI 2021: 81.8 ML/MIN/1.73
EOSINOPHIL # BLD AUTO: 0.07 10*3/MM3 (ref 0–0.4)
EOSINOPHIL NFR BLD AUTO: 1.5 % (ref 0.3–6.2)
ERYTHROCYTE [DISTWIDTH] IN BLOOD BY AUTOMATED COUNT: 13.1 % (ref 12.3–15.4)
GLOBULIN UR ELPH-MCNC: 2.6 GM/DL
GLUCOSE SERPL-MCNC: 154 MG/DL (ref 65–99)
HBA1C MFR BLD: 6.4 % (ref 4.8–5.6)
HCT VFR BLD AUTO: 36.1 % (ref 34–46.6)
HDLC SERPL-MCNC: 47 MG/DL (ref 40–60)
HGB BLD-MCNC: 11.9 G/DL (ref 12–15.9)
IMM GRANULOCYTES # BLD AUTO: 0 10*3/MM3 (ref 0–0.05)
IMM GRANULOCYTES NFR BLD AUTO: 0 % (ref 0–0.5)
LDLC SERPL CALC-MCNC: 77 MG/DL (ref 0–100)
LDLC/HDLC SERPL: 1.52 {RATIO}
LYMPHOCYTES # BLD AUTO: 1.72 10*3/MM3 (ref 0.7–3.1)
LYMPHOCYTES NFR BLD AUTO: 36.1 % (ref 19.6–45.3)
MCH RBC QN AUTO: 30.4 PG (ref 26.6–33)
MCHC RBC AUTO-ENTMCNC: 33 G/DL (ref 31.5–35.7)
MCV RBC AUTO: 92.3 FL (ref 79–97)
MONOCYTES # BLD AUTO: 0.34 10*3/MM3 (ref 0.1–0.9)
MONOCYTES NFR BLD AUTO: 7.1 % (ref 5–12)
NEUTROPHILS NFR BLD AUTO: 2.61 10*3/MM3 (ref 1.7–7)
NEUTROPHILS NFR BLD AUTO: 54.9 % (ref 42.7–76)
NRBC BLD AUTO-RTO: 0 /100 WBC (ref 0–0.2)
PLATELET # BLD AUTO: 218 10*3/MM3 (ref 140–450)
PMV BLD AUTO: 9.6 FL (ref 6–12)
POTASSIUM SERPL-SCNC: 3.9 MMOL/L (ref 3.5–5.2)
PROT SERPL-MCNC: 6.7 G/DL (ref 6–8.5)
RBC # BLD AUTO: 3.91 10*6/MM3 (ref 3.77–5.28)
SODIUM SERPL-SCNC: 141 MMOL/L (ref 136–145)
TRIGL SERPL-MCNC: 172 MG/DL (ref 0–150)
TSH SERPL DL<=0.05 MIU/L-ACNC: 1.13 UIU/ML (ref 0.27–4.2)
VLDLC SERPL-MCNC: 29 MG/DL (ref 5–40)
WBC NRBC COR # BLD: 4.76 10*3/MM3 (ref 3.4–10.8)

## 2022-12-19 PROCEDURE — 83036 HEMOGLOBIN GLYCOSYLATED A1C: CPT | Performed by: INTERNAL MEDICINE

## 2022-12-19 PROCEDURE — 1159F MED LIST DOCD IN RCRD: CPT | Performed by: INTERNAL MEDICINE

## 2022-12-19 PROCEDURE — 84443 ASSAY THYROID STIM HORMONE: CPT | Performed by: INTERNAL MEDICINE

## 2022-12-19 PROCEDURE — 80061 LIPID PANEL: CPT | Performed by: INTERNAL MEDICINE

## 2022-12-19 PROCEDURE — 85025 COMPLETE CBC W/AUTO DIFF WBC: CPT | Performed by: INTERNAL MEDICINE

## 2022-12-19 PROCEDURE — 1170F FXNL STATUS ASSESSED: CPT | Performed by: INTERNAL MEDICINE

## 2022-12-19 PROCEDURE — G0439 PPPS, SUBSEQ VISIT: HCPCS | Performed by: INTERNAL MEDICINE

## 2022-12-19 PROCEDURE — 80053 COMPREHEN METABOLIC PANEL: CPT | Performed by: INTERNAL MEDICINE

## 2022-12-19 RX ORDER — LISINOPRIL 2.5 MG/1
2.5 TABLET ORAL DAILY
Qty: 90 TABLET | Refills: 1 | Status: SHIPPED | OUTPATIENT
Start: 2022-12-19 | End: 2023-04-04

## 2022-12-19 RX ORDER — DAPAGLIFLOZIN 10 MG/1
1 TABLET, FILM COATED ORAL DAILY
Qty: 90 TABLET | Refills: 1 | Status: SHIPPED | OUTPATIENT
Start: 2022-12-19

## 2022-12-19 RX ORDER — LEVOCETIRIZINE DIHYDROCHLORIDE 5 MG/1
5 TABLET, FILM COATED ORAL EVERY EVENING
Qty: 90 TABLET | Refills: 1 | Status: SHIPPED | OUTPATIENT
Start: 2022-12-19

## 2022-12-19 RX ORDER — PANTOPRAZOLE SODIUM 20 MG/1
20 TABLET, DELAYED RELEASE ORAL DAILY
Qty: 90 TABLET | Refills: 3 | Status: SHIPPED | OUTPATIENT
Start: 2022-12-19

## 2022-12-19 NOTE — PROGRESS NOTES
The ABCs of the Annual Wellness Visit  Subsequent Medicare Wellness Visit    Subjective      Sowmya Hodges is a 72 y.o. female who presents for a Subsequent Medicare Wellness Visit.    The following portions of the patient's history were reviewed and   updated as appropriate: allergies, current medications, past family history, past medical history, past social history, past surgical history and problem list.    Compared to one year ago, the patient feels her physical   health is the same.    Compared to one year ago, the patient feels her mental   health is the same.    Recent Hospitalizations:  She was not admitted to the hospital during the last year.       Current Medical Providers:  Patient Care Team:  Romi Rubio MD as PCP - General (Pediatrics)    Outpatient Medications Prior to Visit   Medication Sig Dispense Refill   • atorvastatin (LIPITOR) 20 MG tablet Take 1 tablet by mouth Daily. 90 tablet 1   • Blood Glucose Monitoring Suppl (Accu-Chek Sabrina Plus) w/Device kit 1 kit Take As Directed. To checke blood sugar up to 3 times a day. DX E11.9 1 kit 0   • busPIRone (BUSPAR) 15 MG tablet Take 1 tablet by mouth 3 (Three) Times a Day As Needed (Anxiety). 270 tablet 0   • clonazePAM (KlonoPIN) 1 MG tablet Take 1 tablet by mouth 2 (Two) Times a Day As Needed for Anxiety. 60 tablet 2   • Diclofenac Sodium (VOLTAREN) 1 % gel gel      • escitalopram (LEXAPRO) 20 MG tablet Take 1 tablet by mouth Daily. 90 tablet 1   • escitalopram (LEXAPRO) 20 MG tablet TAKE 1 TABLET BY MOUTH EVERY DAY 90 tablet 1   • fluticasone (FLONASE) 50 MCG/ACT nasal spray USE 1-2 SPRAYS IN EACH NOSTRIL ONCE DAILY AS NEEDED 48 mL 1   • glucose blood test strip Use as instructed 200 each 12   • HYDROcodone-acetaminophen (NORCO)  MG per tablet Take 1 tablet by mouth As Needed.     • hyoscyamine (LEVSIN) 0.125 MG SL tablet Take 1 tablet by mouth Every 4 (Four) Hours As Needed for Cramping or Diarrhea. 90 tablet 1   • Insulin Pen  Needle (Pen Needles) 32G X 4 MM misc 1 each 1 (One) Time Per Week. 90 each 1   • levETIRAcetam (KEPPRA) 750 MG tablet Take 1 tablet by mouth 2 (Two) Times a Day. 180 tablet 1   • lidocaine (LIDODERM) 5 % Place 1 patch on the skin as directed by provider Daily.     • neomycin-polymyxin-hydrocortisone (CORTISPORIN) 3.5-50847-1 otic solution Administer 3 drops to the right ear 4 (Four) Times a Day. Use for 7 days 10 mL 0   • Ozempic, 1 MG/DOSE, 4 MG/3ML solution pen-injector INJECT 1 MG UNDER THE SKIN INTO THE APPROPRIATE AREA AS DIRECTED 1 (ONE) TIME PER WEEK FOR 30 DAYS. 5 pen 5   • Farxiga 10 MG tablet TAKE 1 TABLET (10 MG) BY ORAL ROUTE ONCE DAILY IN THE MORNING -NEEDS APPOINTMENT AND LABS 90 tablet 1   • levocetirizine (XYZAL) 5 MG tablet Take 1 tablet by mouth Every Evening. 90 tablet 1   • lisinopril (PRINIVIL,ZESTRIL) 2.5 MG tablet TAKE 1 TABLET BY MOUTH EVERY DAY 90 tablet 1   • pantoprazole (PROTONIX) 20 MG EC tablet TAKE 1 TABLET BY MOUTH EVERY DAY 90 tablet 1     No facility-administered medications prior to visit.       Opioid medication/s are on active medication list.  and I have evaluated her active treatment plan and pain score trends (see table).  There were no vitals filed for this visit.  I have reviewed the chart for potential of high risk medication and harmful drug interactions in the elderly.            Aspirin is not on active medication list.  Aspirin use is not indicated based on review of current medical condition/s. Risk of harm outweighs potential benefits.  .    Patient Active Problem List   Diagnosis   • Type 2 diabetes mellitus with hyperglycemia, without long-term current use of insulin (HCC)   • Depression   • Gastroesophageal reflux disease   • Hyperlipidemia   • Renal cell adenocarcinoma, right (HCC)   • Vertigo   • Cough   • Medication management   • Anxiety   • Vitamin D deficiency   • Chronic bilateral low back pain with bilateral sciatica   • Allergic rhinitis   • Nonintractable  "epilepsy without status epilepticus (HCC)   • Nausea and vomiting in adult   • Abdominal cramps   • Diarrhea     Advance Care Planning  Advance Directive is on file.  ACP discussion was held with the patient during this visit. Patient has an advance directive in EMR which is still valid.      Objective    Vitals:    12/19/22 1452   BP: 116/58   Pulse: 74   SpO2: 98%   Weight: 108 kg (238 lb)   Height: 170.2 cm (67\")     Estimated body mass index is 37.28 kg/m² as calculated from the following:    Height as of this encounter: 170.2 cm (67\").    Weight as of this encounter: 108 kg (238 lb).    Class 2 Severe Obesity (BMI >=35 and <=39.9). Obesity-related health conditions include the following: hypertension, diabetes mellitus and dyslipidemias. Obesity is unchanged. BMI is is above average; BMI management plan is completed. We discussed portion control and increasing exercise.      Does the patient have evidence of cognitive impairment?   No            HEALTH RISK ASSESSMENT    Smoking Status:  Social History     Tobacco Use   Smoking Status Former   • Packs/day: 1.50   • Years: 4.00   • Pack years: 6.00   • Types: Cigarettes   Smokeless Tobacco Never     Alcohol Consumption:  Social History     Substance and Sexual Activity   Alcohol Use Never    Comment: drinks less than 1 drink per day      Fall Risk Screen:    DAMION Fall Risk Assessment was completed, and patient is at HIGH risk for falls. Assessment completed on:6/1/2022    Depression Screening:  PHQ-2/PHQ-9 Depression Screening 12/19/2022   Retired PHQ-9 Total Score -   Retired Total Score -   Little Interest or Pleasure in Doing Things 0-->not at all   Feeling Down, Depressed or Hopeless 0-->not at all   PHQ-9: Brief Depression Severity Measure Score 0       Health Habits and Functional and Cognitive Screening:  Functional & Cognitive Status 12/19/2022   Do you have difficulty preparing food and eating? No   Do you have difficulty bathing yourself, getting " dressed or grooming yourself? No   Do you have difficulty using the toilet? No   Do you have difficulty moving around from place to place? No   Do you have trouble with steps or getting out of a bed or a chair? No   Current Diet Well Balanced Diet   Dental Exam Up to date   Eye Exam Up to date   Exercise (times per week) 2 times per week   Current Exercises Include Walking   Do you need help using the phone?  No   Are you deaf or do you have serious difficulty hearing?  No   Do you need help with transportation? No   Do you need help shopping? No   Do you need help preparing meals?  No   Do you need help with housework?  No   Do you need help with laundry? No   Do you need help taking your medications? No   Do you need help managing money? No   Do you ever drive or ride in a car without wearing a seat belt? No   Have you felt unusual stress, anger or loneliness in the last month? No   Who do you live with? Alone   If you need help, do you have trouble finding someone available to you? No   Have you been bothered in the last four weeks by sexual problems? No   Do you have difficulty concentrating, remembering or making decisions? No       Age-appropriate Screening Schedule:  Refer to the list below for future screening recommendations based on patient's age, sex and/or medical conditions. Orders for these recommended tests are listed in the plan section. The patient has been provided with a written plan.    Health Maintenance   Topic Date Due   • TDAP/TD VACCINES (1 - Tdap) Never done   • DIABETIC EYE EXAM  04/27/2022   • HEMOGLOBIN A1C  12/01/2022   • DIABETIC FOOT EXAM  06/01/2023   • LIPID PANEL  06/01/2023   • URINE MICROALBUMIN  06/01/2023   • DXA SCAN  11/17/2023   • MAMMOGRAM  10/03/2024   • INFLUENZA VACCINE  Completed   • ZOSTER VACCINE  Completed                CMS Preventative Services Quick Reference  Risk Factors Identified During Encounter:    None Identified    The above risks/problems have been  discussed with the patient.  Pertinent information has been shared with the patient in the After Visit Summary.    Diagnoses and all orders for this visit:    1. Encounter for subsequent annual wellness visit (AWV) in Medicare patient (Primary)    2. Type 2 diabetes mellitus with hyperglycemia, without long-term current use of insulin (HCC)  -     CBC & Differential  -     Comprehensive Metabolic Panel  -     Hemoglobin A1c  -     Lipid Panel  -     TSH    3. Hyperlipidemia, unspecified hyperlipidemia type    Other orders  -     pantoprazole (PROTONIX) 20 MG EC tablet; Take 1 tablet by mouth Daily.  Dispense: 90 tablet; Refill: 3  -     dapagliflozin Propanediol (Farxiga) 10 MG tablet; Take 10 mg by mouth Daily.  Dispense: 90 tablet; Refill: 1  -     lisinopril (PRINIVIL,ZESTRIL) 2.5 MG tablet; Take 1 tablet by mouth Daily.  Dispense: 90 tablet; Refill: 1  -     levocetirizine (XYZAL) 5 MG tablet; Take 1 tablet by mouth Every Evening.  Dispense: 90 tablet; Refill: 1        Follow Up:   Next Medicare Wellness visit to be scheduled in 1 year.    Return in about 6 months (around 6/19/2023) for Next scheduled follow up.    An After Visit Summary and PPPS were made available to the patient.

## 2023-01-17 ENCOUNTER — ANESTHESIA (OUTPATIENT)
Dept: GASTROENTEROLOGY | Facility: HOSPITAL | Age: 73
End: 2023-01-17
Payer: MEDICARE

## 2023-01-17 ENCOUNTER — HOSPITAL ENCOUNTER (OUTPATIENT)
Facility: HOSPITAL | Age: 73
Setting detail: HOSPITAL OUTPATIENT SURGERY
Discharge: HOME | End: 2023-01-17
Attending: INTERNAL MEDICINE | Admitting: INTERNAL MEDICINE
Payer: MEDICARE

## 2023-01-17 ENCOUNTER — ANESTHESIA EVENT (OUTPATIENT)
Dept: GASTROENTEROLOGY | Facility: HOSPITAL | Age: 73
End: 2023-01-17
Payer: MEDICARE

## 2023-01-17 VITALS
HEART RATE: 75 BPM | OXYGEN SATURATION: 96 % | WEIGHT: 235.89 LBS | RESPIRATION RATE: 17 BRPM | SYSTOLIC BLOOD PRESSURE: 113 MMHG | BODY MASS INDEX: 36.95 KG/M2 | TEMPERATURE: 97.4 F | DIASTOLIC BLOOD PRESSURE: 61 MMHG

## 2023-01-17 DIAGNOSIS — R19.7 DIARRHEA, UNSPECIFIED TYPE: ICD-10-CM

## 2023-01-17 DIAGNOSIS — R10.9 ABDOMINAL CRAMPS: ICD-10-CM

## 2023-01-17 LAB — GLUCOSE BLDC GLUCOMTR-MCNC: 132 MG/DL (ref 70–99)

## 2023-01-17 PROCEDURE — 88305 TISSUE EXAM BY PATHOLOGIST: CPT | Performed by: INTERNAL MEDICINE

## 2023-01-17 PROCEDURE — 25010000002 PROPOFOL 10 MG/ML EMULSION: Performed by: NURSE ANESTHETIST, CERTIFIED REGISTERED

## 2023-01-17 PROCEDURE — 45385 COLONOSCOPY W/LESION REMOVAL: CPT | Performed by: INTERNAL MEDICINE

## 2023-01-17 PROCEDURE — 45380 COLONOSCOPY AND BIOPSY: CPT | Performed by: INTERNAL MEDICINE

## 2023-01-17 PROCEDURE — 82962 GLUCOSE BLOOD TEST: CPT

## 2023-01-17 RX ORDER — SODIUM CHLORIDE, SODIUM LACTATE, POTASSIUM CHLORIDE, CALCIUM CHLORIDE 600; 310; 30; 20 MG/100ML; MG/100ML; MG/100ML; MG/100ML
30 INJECTION, SOLUTION INTRAVENOUS CONTINUOUS
Status: DISCONTINUED | OUTPATIENT
Start: 2023-01-17 | End: 2023-01-17 | Stop reason: HOSPADM

## 2023-01-17 RX ORDER — PROPOFOL 10 MG/ML
VIAL (ML) INTRAVENOUS AS NEEDED
Status: DISCONTINUED | OUTPATIENT
Start: 2023-01-17 | End: 2023-01-17 | Stop reason: SURG

## 2023-01-17 RX ORDER — LIDOCAINE HYDROCHLORIDE 20 MG/ML
INJECTION, SOLUTION EPIDURAL; INFILTRATION; INTRACAUDAL; PERINEURAL AS NEEDED
Status: DISCONTINUED | OUTPATIENT
Start: 2023-01-17 | End: 2023-01-17 | Stop reason: SURG

## 2023-01-17 RX ADMIN — LIDOCAINE HYDROCHLORIDE 40 MG: 20 INJECTION, SOLUTION EPIDURAL; INFILTRATION; INTRACAUDAL; PERINEURAL at 11:58

## 2023-01-17 RX ADMIN — SODIUM CHLORIDE, POTASSIUM CHLORIDE, SODIUM LACTATE AND CALCIUM CHLORIDE 30 ML/HR: 600; 310; 30; 20 INJECTION, SOLUTION INTRAVENOUS at 10:38

## 2023-01-17 RX ADMIN — PROPOFOL 100 MG: 10 INJECTION, EMULSION INTRAVENOUS at 11:58

## 2023-01-17 RX ADMIN — PROPOFOL 200 MCG/KG/MIN: 10 INJECTION, EMULSION INTRAVENOUS at 12:00

## 2023-01-17 NOTE — H&P
Pre Procedure History & Physical    Chief Complaint:   Diarrhea and abdominal cramps    Subjective     HPI:   Chronic diarrhea and abdominal cramping    Past Medical History:   Past Medical History:   Diagnosis Date   • Anemia    • Anxiety    • Arthritis    • Constipation    • Depression    • Diabetes (HCC)    • Diverticulitis    • Epilepsy (HCC)    • Fracture of distal fibula 09/26/2017    right   • GERD (gastroesophageal reflux disease)    • Heart disease    • HTN (hypertension)    • Hyperlipidemia    • Limb swelling    • Lower abdominal pain    • TAYLOR (obstructive sleep apnea)    • Polyp, sinus maxillary    • Renal cell adenocarcinoma, right (HCC) 12/10/2014   • Seasonal allergies    • Shortness of breath        Past Surgical History:  Past Surgical History:   Procedure Laterality Date   • CARDIAC SURGERY      open heart at age 4    • CARDIAC VALVE SURGERY     • CHOLECYSTECTOMY     • COLONOSCOPY  04/12/2017    dr monique   • ENDOSCOPY  04/12/2017    dr monique   • ENDOSCOPY N/A 6/9/2022    Procedure: ESOPHAGOGASTRODUODENOSCOPY with biopsy;  Surgeon: Sean Monique MD;  Location: [facility] ENDOSCOPY;  Service: Gastroenterology;  Laterality: N/A;  gastritis   • EYE SURGERY      implant   • NEPHRECTOMY PARTIAL Right 12/02/2014    robotic right partial nephrectomy w dr garvey   • OTHER SURGICAL HISTORY      joint surgery   • SALIVARY GLAND SURGERY     • TONSILLECTOMY     • TOTAL KNEE ARTHROPLASTY Right    • UPPER GASTROINTESTINAL ENDOSCOPY  12/22/2020    Dr. Monique   • WRIST SURGERY Left        Family History:  Family History   Problem Relation Age of Onset   • Arthritis Mother    • Arthritis Father    • Cancer Father    • Arthritis Sister    • Arthritis Brother    • Colon cancer Neg Hx    • Malig Hyperthermia Neg Hx        Social History:   reports that she has quit smoking. Her smoking use included cigarettes. She has a 6.00 pack-year smoking history. She has never used smokeless tobacco. She reports that she does  not drink alcohol and does not use drugs.    Medications:   Medications Prior to Admission   Medication Sig Dispense Refill Last Dose   • atorvastatin (LIPITOR) 20 MG tablet Take 1 tablet by mouth Daily. 90 tablet 1 1/16/2023   • Blood Glucose Monitoring Suppl (Accu-Chek Sabrina Plus) w/Device kit 1 kit Take As Directed. To checke blood sugar up to 3 times a day. DX E11.9 1 kit 0 1/16/2023   • busPIRone (BUSPAR) 15 MG tablet Take 1 tablet by mouth 3 (Three) Times a Day As Needed (Anxiety). 270 tablet 0 1/16/2023   • dapagliflozin Propanediol (Farxiga) 10 MG tablet Take 10 mg by mouth Daily. 90 tablet 1 Past Week   • Diclofenac Sodium (VOLTAREN) 1 % gel gel Apply 4 g topically to the appropriate area as directed 3 (Three) Times a Day As Needed.   Past Month   • escitalopram (LEXAPRO) 20 MG tablet Take 1 tablet by mouth Daily. 90 tablet 1 1/17/2023 at 0630   • fluticasone (FLONASE) 50 MCG/ACT nasal spray USE 1-2 SPRAYS IN EACH NOSTRIL ONCE DAILY AS NEEDED 48 mL 1 1/16/2023   • glucose blood test strip Use as instructed 200 each 12 1/16/2023   • HYDROcodone-acetaminophen (NORCO)  MG per tablet Take 1 tablet by mouth As Needed.   Past Week   • Insulin Pen Needle (Pen Needles) 32G X 4 MM misc 1 each 1 (One) Time Per Week. 90 each 1 1/16/2023   • levETIRAcetam (KEPPRA) 750 MG tablet Take 1 tablet by mouth 2 (Two) Times a Day. 180 tablet 1 1/17/2023 at 0630   • levocetirizine (XYZAL) 5 MG tablet Take 1 tablet by mouth Every Evening. 90 tablet 1 1/16/2023   • lidocaine (LIDODERM) 5 % Place 1 patch on the skin as directed by provider Daily.   Past Week   • lisinopril (PRINIVIL,ZESTRIL) 2.5 MG tablet Take 1 tablet by mouth Daily. 90 tablet 1 1/16/2023   • Ozempic, 1 MG/DOSE, 4 MG/3ML solution pen-injector INJECT 1 MG UNDER THE SKIN INTO THE APPROPRIATE AREA AS DIRECTED 1 (ONE) TIME PER WEEK FOR 30 DAYS. 5 pen 5 Past Week   • pantoprazole (PROTONIX) 20 MG EC tablet Take 1 tablet by mouth Daily. 90 tablet 3 1/17/2023 at  0630   • vitamin D3 125 MCG (5000 UT) capsule capsule Take 5,000 Units by mouth Daily.   Past Month   • clonazePAM (KlonoPIN) 1 MG tablet Take 1 tablet by mouth 2 (Two) Times a Day As Needed for Anxiety. 60 tablet 2 More than a month       Allergies:  Codeine        Objective     Blood pressure 131/55, pulse 69, temperature 97.5 °F (36.4 °C), temperature source Temporal, resp. rate 18, weight 107 kg (235 lb 14.3 oz), SpO2 95 %, not currently breastfeeding.    Physical Exam   Constitutional: Pt is oriented to person, place, and time and well-developed, well-nourished, and in no distress.   Mouth/Throat: Oropharynx is clear and moist.   Neck: Normal range of motion.   Cardiovascular: Normal rate, regular rhythm and normal heart sounds.    Pulmonary/Chest: Effort normal and breath sounds normal.   Abdominal: Soft. Nontender  Skin: Skin is warm and dry.   Psychiatric: Mood, memory, affect and judgment normal.     Assessment & Plan     Diagnosis:  Diarrhea    Anticipated Surgical Procedure:  Colonoscopy    The risks, benefits, and alternatives of this procedure have been discussed with the patient or the responsible party- the patient understands and agrees to proceed.

## 2023-01-17 NOTE — ANESTHESIA PREPROCEDURE EVALUATION
" Anesthesia Evaluation     Patient summary reviewed and Nursing notes reviewed   no history of anesthetic complications:  NPO Solid Status: > 8 hours  NPO Liquid Status: > 2 hours           Airway   Mallampati: II  TM distance: >3 FB  Neck ROM: full  No difficulty expected  Dental      Pulmonary - normal exam    breath sounds clear to auscultation  (+) a smoker Former, sleep apnea,   Cardiovascular - normal exam  Exercise tolerance: good (4-7 METS)    Rhythm: regular  Rate: normal    (+) hypertension, hyperlipidemia,     ROS comment: Congenital open heart age 4, \"hole\", no problems since    Neuro/Psych  (+) seizures well controlled,    GI/Hepatic/Renal/Endo    (+) obesity,   renal disease (adenoCA, removed), diabetes mellitus,     Musculoskeletal     Abdominal    Substance History      OB/GYN          Other        ROS/Med Hx Other: PAT Nursing Notes unavailable.                   Anesthesia Plan    ASA 3     general   total IV anesthesia    Anesthetic plan, risks, benefits, and alternatives have been provided, discussed and informed consent has been obtained with: patient.        CODE STATUS:       "

## 2023-01-17 NOTE — ANESTHESIA POSTPROCEDURE EVALUATION
Patient: Sowmya Hodges    Procedure Summary     Date: 01/17/23 Room / Location: [facility] ENDOSCOPY 5 / [facility] ENDOSCOPY    Anesthesia Start: 1154 Anesthesia Stop: 1224    Procedure: COLONOSCOPY WITH BIOPSIES, POLYPECTOMY Diagnosis:       Abdominal cramps      Diarrhea, unspecified type      (Abdominal cramps [R10.9])      (Diarrhea, unspecified type [R19.7])    Surgeons: Sean Oneil MD Provider: Mg Zepeda MD    Anesthesia Type: general ASA Status: 3          Anesthesia Type: general    Vitals  Vitals Value Taken Time   /61 01/17/23 1237   Temp 36.3 °C (97.4 °F) 01/17/23 1237   Pulse 67 01/17/23 1238   Resp 17 01/17/23 1237   SpO2 97 % 01/17/23 1238   Vitals shown include unvalidated device data.        Post Anesthesia Care and Evaluation    Patient location during evaluation: bedside  Patient participation: complete - patient participated  Level of consciousness: awake  Pain management: adequate    Airway patency: patent  Anesthetic complications: No anesthetic complications  PONV Status: none  Cardiovascular status: acceptable and stable  Respiratory status: acceptable  Hydration status: acceptable    Comments: An Anesthesiologist personally participated in the most demanding procedures (including induction and emergence if applicable) in the anesthesia plan, monitored the course of anesthesia administration at frequent intervals and remained physically present and available for immediate diagnosis and treatment of emergencies.

## 2023-02-03 ENCOUNTER — TELEPHONE (OUTPATIENT)
Dept: UROLOGY | Facility: CLINIC | Age: 73
End: 2023-02-03
Payer: MEDICARE

## 2023-02-03 DIAGNOSIS — C64.1 RENAL CELL ADENOCARCINOMA, RIGHT: Primary | ICD-10-CM

## 2023-02-03 NOTE — TELEPHONE ENCOUNTER
Spoke with Pt and scheduled her follow up appointment with Dr. Butts on 5/24/23 at 2:15. I informed her that scheduling would call her in late March/early April to schedule her CT scan in May before her follow up appointment. She voiced understanding.

## 2023-02-28 ENCOUNTER — TELEPHONE (OUTPATIENT)
Dept: GASTROENTEROLOGY | Facility: CLINIC | Age: 73
End: 2023-02-28
Payer: MEDICARE

## 2023-02-28 NOTE — TELEPHONE ENCOUNTER
Patient called in to the office to cancel her 3/2/23 appointment while I was trying to get her rescheduled the call ended. Attempted to call back but got no answer.

## 2023-03-03 RX ORDER — ATORVASTATIN CALCIUM 20 MG/1
TABLET, FILM COATED ORAL
Qty: 90 TABLET | Refills: 1 | Status: SHIPPED | OUTPATIENT
Start: 2023-03-03

## 2023-04-04 RX ORDER — LISINOPRIL 2.5 MG/1
TABLET ORAL
Qty: 90 TABLET | Refills: 1 | Status: SHIPPED | OUTPATIENT
Start: 2023-04-04

## 2023-04-04 RX ORDER — LEVETIRACETAM 750 MG/1
TABLET ORAL
Qty: 180 TABLET | Refills: 1 | Status: SHIPPED | OUTPATIENT
Start: 2023-04-04

## 2023-04-05 ENCOUNTER — OFFICE VISIT (OUTPATIENT)
Dept: INTERNAL MEDICINE | Facility: CLINIC | Age: 73
End: 2023-04-05
Payer: MEDICARE

## 2023-04-05 VITALS
DIASTOLIC BLOOD PRESSURE: 66 MMHG | TEMPERATURE: 98.2 F | WEIGHT: 244.25 LBS | HEART RATE: 74 BPM | OXYGEN SATURATION: 98 % | BODY MASS INDEX: 38.34 KG/M2 | HEIGHT: 67 IN | RESPIRATION RATE: 18 BRPM | SYSTOLIC BLOOD PRESSURE: 122 MMHG

## 2023-04-05 DIAGNOSIS — M54.41 CHRONIC BILATERAL LOW BACK PAIN WITH BILATERAL SCIATICA: Primary | ICD-10-CM

## 2023-04-05 DIAGNOSIS — N83.202 CYST OF LEFT OVARY: ICD-10-CM

## 2023-04-05 DIAGNOSIS — G89.29 CHRONIC BILATERAL LOW BACK PAIN WITH BILATERAL SCIATICA: Primary | ICD-10-CM

## 2023-04-05 DIAGNOSIS — Z79.899 MEDICATION MANAGEMENT: ICD-10-CM

## 2023-04-05 DIAGNOSIS — E04.1 THYROID NODULE: ICD-10-CM

## 2023-04-05 DIAGNOSIS — M54.42 CHRONIC BILATERAL LOW BACK PAIN WITH BILATERAL SCIATICA: Primary | ICD-10-CM

## 2023-04-05 DIAGNOSIS — F41.9 ANXIETY: ICD-10-CM

## 2023-04-05 PROCEDURE — 80305 DRUG TEST PRSMV DIR OPT OBS: CPT | Performed by: INTERNAL MEDICINE

## 2023-04-05 PROCEDURE — 99213 OFFICE O/P EST LOW 20 MIN: CPT | Performed by: INTERNAL MEDICINE

## 2023-04-05 RX ORDER — TIZANIDINE 2 MG/1
2 TABLET ORAL NIGHTLY PRN
Qty: 30 TABLET | Refills: 1 | Status: SHIPPED | OUTPATIENT
Start: 2023-04-05 | End: 2023-04-17

## 2023-04-13 ENCOUNTER — TELEPHONE (OUTPATIENT)
Dept: INTERNAL MEDICINE | Facility: CLINIC | Age: 73
End: 2023-04-13
Payer: MEDICARE

## 2023-04-13 NOTE — TELEPHONE ENCOUNTER
Pt was at the dentist and they noticed a bump on her neck. Dentist thinks it could be her thyroid and recommended she gets an ultrasound on it. She let me know she has an ultrasound coming up and was hoping she could just get it added to that one. Please advise.

## 2023-04-17 ENCOUNTER — OFFICE VISIT (OUTPATIENT)
Dept: INTERNAL MEDICINE | Facility: CLINIC | Age: 73
End: 2023-04-17
Payer: MEDICARE

## 2023-04-17 VITALS
SYSTOLIC BLOOD PRESSURE: 122 MMHG | BODY MASS INDEX: 38.45 KG/M2 | WEIGHT: 245 LBS | DIASTOLIC BLOOD PRESSURE: 54 MMHG | RESPIRATION RATE: 16 BRPM | TEMPERATURE: 97.1 F | OXYGEN SATURATION: 95 % | HEIGHT: 67 IN | HEART RATE: 73 BPM

## 2023-04-17 DIAGNOSIS — R59.1 LYMPHADENOPATHY: ICD-10-CM

## 2023-04-17 DIAGNOSIS — G47.00 INSOMNIA, UNSPECIFIED TYPE: Primary | ICD-10-CM

## 2023-04-17 PROCEDURE — 1160F RVW MEDS BY RX/DR IN RCRD: CPT | Performed by: PHYSICIAN ASSISTANT

## 2023-04-17 PROCEDURE — 99213 OFFICE O/P EST LOW 20 MIN: CPT | Performed by: PHYSICIAN ASSISTANT

## 2023-04-17 PROCEDURE — 1159F MED LIST DOCD IN RCRD: CPT | Performed by: PHYSICIAN ASSISTANT

## 2023-04-17 RX ORDER — TRAZODONE HYDROCHLORIDE 50 MG/1
50 TABLET ORAL NIGHTLY
Qty: 30 TABLET | Refills: 2 | Status: SHIPPED | OUTPATIENT
Start: 2023-04-17 | End: 2023-05-17

## 2023-04-17 NOTE — ASSESSMENT & PLAN NOTE
Seems to be reactive at this time, most likely due to recent dental work.  If symptoms persist or worsen could consider further imaging potentially ultrasound or CT of neck if concerning.  Call for any questions or concerns.

## 2023-04-17 NOTE — ASSESSMENT & PLAN NOTE
Go ahead and discontinue to tizanidine at this time.  Will have patient do a trial of trazodone to see if this will help with sleep.  Also considered hydroxyzine but patient may benefit from antidepressant if effect of trazodone as well.  Discussed with patient if this medication does not help with insomnia may need to consider changing Lexapro to something else or adding another medication to help with anxiety/depression.  Patient to follow-up with PCP in 1 month to reevaluate.

## 2023-04-17 NOTE — PROGRESS NOTES
"Chief Complaint  Insomnia (Pt states tizanidine isn't helping) and Swollen Glands    Subjective          Sowmya Hodges presents to [facility]  History of Present Illness  Lymphadeopathy- patient noticed an enlarged lymph node a few weeks ago.  Patient recently had a tooth pulled on that side and noticed the spot shortly after.  Patient is scheduled to have thyroid ultrasound in a week or two and wanted to make sure she could get it checked out then.    Insomina- patient states that a lot of time she stays awake in the night and won't go to sleep until 3-4 o'clock then will only sleep about an hour or two.  \"My mind won't shut off\".  Patient states that she has been under some stress with her next-door neighbor but states that her Lexapro seems to be working okay.  She is not interested in adding or changing antidepressant at this time.      Objective   Vital Signs:   /54   Pulse 73   Temp 97.1 °F (36.2 °C)   Resp 16   Ht 170.2 cm (67\")   Wt 111 kg (245 lb)   SpO2 95%   BMI 38.37 kg/m²     Physical Exam  Vitals reviewed.   Constitutional:       Appearance: Normal appearance. She is well-developed.   HENT:      Head: Normocephalic and atraumatic.      Right Ear: Tympanic membrane and ear canal normal.      Left Ear: Tympanic membrane and ear canal normal.      Mouth/Throat:      Mouth: Mucous membranes are moist.      Pharynx: Oropharynx is clear. No posterior oropharyngeal erythema.   Eyes:      Conjunctiva/sclera: Conjunctivae normal.      Pupils: Pupils are equal, round, and reactive to light.   Cardiovascular:      Rate and Rhythm: Normal rate and regular rhythm.      Heart sounds: No murmur heard.    No friction rub. No gallop.   Pulmonary:      Effort: Pulmonary effort is normal.      Breath sounds: Normal breath sounds. No wheezing or rhonchi.   Lymphadenopathy:      Cervical: Cervical adenopathy (subtle L anterior) present.   Skin:     General: " Skin is warm and dry.   Neurological:      Mental Status: She is alert and oriented to person, place, and time.      Cranial Nerves: No cranial nerve deficit.   Psychiatric:         Mood and Affect: Mood and affect normal.         Behavior: Behavior normal.         Thought Content: Thought content normal.         Judgment: Judgment normal.        Result Review :          Procedures      Assessment and Plan    Diagnoses and all orders for this visit:    1. Insomnia, unspecified type (Primary)  Assessment & Plan:  Go ahead and discontinue to tizanidine at this time.  Will have patient do a trial of trazodone to see if this will help with sleep.  Also considered hydroxyzine but patient may benefit from antidepressant if effect of trazodone as well.  Discussed with patient if this medication does not help with insomnia may need to consider changing Lexapro to something else or adding another medication to help with anxiety/depression.  Patient to follow-up with PCP in 1 month to reevaluate.      2. Lymphadenopathy  Assessment & Plan:  Seems to be reactive at this time, most likely due to recent dental work.  If symptoms persist or worsen could consider further imaging potentially ultrasound or CT of neck if concerning.  Call for any questions or concerns.      Other orders  -     traZODone (DESYREL) 50 MG tablet; Take 1 tablet by mouth Every Night for 30 days.  Dispense: 30 tablet; Refill: 2            Follow Up   No follow-ups on file.  Patient was given instructions and counseling regarding her condition or for health maintenance advice. Please see specific information pulled into the AVS if appropriate.

## 2023-04-24 ENCOUNTER — TELEPHONE (OUTPATIENT)
Dept: INTERNAL MEDICINE | Facility: CLINIC | Age: 73
End: 2023-04-24
Payer: MEDICARE

## 2023-04-24 DIAGNOSIS — R59.1 LYMPHADENOPATHY: Primary | ICD-10-CM

## 2023-04-24 RX ORDER — CLONAZEPAM 1 MG/1
1 TABLET ORAL 2 TIMES DAILY PRN
Qty: 60 TABLET | Refills: 2 | Status: SHIPPED | OUTPATIENT
Start: 2023-04-24

## 2023-04-24 NOTE — PROGRESS NOTES
"Chief Complaint  Back Pain (Back has been hurting, and can't sleep very well, needing something to help sleep /Also has been coughing for quite a while and wants to see about something for it, )    Subjective       Sowmya Hodges presents to [facility]    HPI   Presenting for evaluation of back pain that has been interrupting her sleep.   Has been taking OTC medication for pain.     Objective     Vitals:    04/05/23 1420   BP: 122/66   BP Location: Left arm   Patient Position: Sitting   Cuff Size: Adult   Pulse: 74   Resp: 18   Temp: 98.2 °F (36.8 °C)   TempSrc: Temporal   SpO2: 98%   Weight: 111 kg (244 lb 4 oz)   Height: 170.2 cm (67\")      Wt Readings from Last 3 Encounters:   04/17/23 111 kg (245 lb)   04/05/23 111 kg (244 lb 4 oz)   01/17/23 107 kg (235 lb 14.3 oz)      BP Readings from Last 3 Encounters:   04/17/23 122/54   04/05/23 122/66   01/17/23 113/61        Body mass index is 38.25 kg/m².           Physical Exam  Vitals reviewed.   Constitutional:       Appearance: Normal appearance. She is well-developed.   HENT:      Head: Normocephalic and atraumatic.      Mouth/Throat:      Pharynx: No oropharyngeal exudate.   Eyes:      Conjunctiva/sclera: Conjunctivae normal.      Pupils: Pupils are equal, round, and reactive to light.   Cardiovascular:      Rate and Rhythm: Normal rate and regular rhythm.      Heart sounds: No murmur heard.    No friction rub. No gallop.   Pulmonary:      Effort: Pulmonary effort is normal.      Breath sounds: Normal breath sounds. No wheezing or rhonchi.   Skin:     General: Skin is warm and dry.   Neurological:      Mental Status: She is alert and oriented to person, place, and time.   Psychiatric:         Mood and Affect: Affect normal.          Result Review :   The following data was reviewed by: Romi Rubio MD on 04/05/2023:        Procedures    Assessment and Plan   Diagnoses and all orders for this " visit:    1. Chronic bilateral low back pain with bilateral sciatica (Primary)    2. Anxiety  -     clonazePAM (KlonoPIN) 1 MG tablet; Take 1 tablet by mouth 2 (Two) Times a Day As Needed for Anxiety.  Dispense: 60 tablet; Refill: 2    3. Medication management  -     POC Urine Drug Screen [facility]    4. Thyroid nodule  -     US Thyroid; Future    5. Cyst of left ovary  -     US Non-ob Transvaginal; Future    Other orders  -     Discontinue: tiZANidine (ZANAFLEX) 2 MG tablet; Take 1 tablet by mouth At Night As Needed for Muscle Spasms.  Dispense: 30 tablet; Refill: 1          Follow Up   Return if symptoms worsen or fail to improve.  Patient was given instructions and counseling regarding her condition or for health maintenance advice. Please see specific information pulled into the AVS if appropriate.

## 2023-04-27 ENCOUNTER — HOSPITAL ENCOUNTER (OUTPATIENT)
Dept: ULTRASOUND IMAGING | Facility: HOSPITAL | Age: 73
Discharge: HOME | End: 2023-04-27
Payer: MEDICARE

## 2023-04-27 DIAGNOSIS — E04.1 THYROID NODULE: ICD-10-CM

## 2023-04-27 DIAGNOSIS — N83.202 CYST OF LEFT OVARY: ICD-10-CM

## 2023-04-27 PROCEDURE — 76536 US EXAM OF HEAD AND NECK: CPT

## 2023-04-27 PROCEDURE — 76830 TRANSVAGINAL US NON-OB: CPT

## 2023-04-28 RX ORDER — TIZANIDINE 2 MG/1
2 TABLET ORAL NIGHTLY PRN
Qty: 30 TABLET | Refills: 1 | OUTPATIENT
Start: 2023-04-28

## 2023-05-01 RX ORDER — ESCITALOPRAM OXALATE 20 MG/1
TABLET ORAL
Qty: 90 TABLET | Refills: 1 | Status: SHIPPED | OUTPATIENT
Start: 2023-05-01

## 2023-05-15 ENCOUNTER — TELEPHONE (OUTPATIENT)
Dept: UROLOGY | Facility: CLINIC | Age: 73
End: 2023-05-15
Payer: MEDICARE

## 2023-05-15 RX ORDER — SEMAGLUTIDE 1.34 MG/ML
1 INJECTION, SOLUTION SUBCUTANEOUS WEEKLY
Qty: 9 ML | Refills: 3 | Status: SHIPPED | OUTPATIENT
Start: 2023-05-15

## 2023-05-15 NOTE — TELEPHONE ENCOUNTER
Called number provided, spoke with representative and received authorization number [id number] valid from 5/5-8/2. Gave information to Rosa Andrew.

## 2023-05-15 NOTE — TELEPHONE ENCOUNTER
[employer] called to report that the CT is currently pending denial due to lack of information. You can call to see what they need or set up a peer to peer. Call [phone].

## 2023-05-17 ENCOUNTER — HOSPITAL ENCOUNTER (OUTPATIENT)
Dept: CT IMAGING | Facility: HOSPITAL | Age: 73
Discharge: HOME | End: 2023-05-17
Admitting: UROLOGY
Payer: MEDICARE

## 2023-05-17 DIAGNOSIS — C64.1 RENAL CELL ADENOCARCINOMA, RIGHT: ICD-10-CM

## 2023-05-17 PROCEDURE — 25510000001 IOPAMIDOL PER 1 ML: Performed by: UROLOGY

## 2023-05-17 PROCEDURE — 74178 CT ABD&PLV WO CNTR FLWD CNTR: CPT

## 2023-05-17 RX ADMIN — IOPAMIDOL 100 ML: 755 INJECTION, SOLUTION INTRAVENOUS at 13:34

## 2023-05-24 ENCOUNTER — OFFICE VISIT (OUTPATIENT)
Dept: UROLOGY | Facility: CLINIC | Age: 73
End: 2023-05-24
Payer: MEDICARE

## 2023-05-24 VITALS — WEIGHT: 252.6 LBS | BODY MASS INDEX: 39.65 KG/M2 | HEIGHT: 67 IN

## 2023-05-24 DIAGNOSIS — C64.1 RENAL CELL ADENOCARCINOMA, RIGHT: Primary | ICD-10-CM

## 2023-05-24 LAB
BILIRUB BLD-MCNC: NEGATIVE MG/DL
CLARITY, POC: CLEAR
COLOR UR: YELLOW
EXPIRATION DATE: ABNORMAL
GLUCOSE UR STRIP-MCNC: ABNORMAL MG/DL
KETONES UR QL: NEGATIVE
LEUKOCYTE EST, POC: NEGATIVE
Lab: ABNORMAL
NITRITE UR-MCNC: NEGATIVE MG/ML
PH UR: 6.5 [PH] (ref 5–8)
PROT UR STRIP-MCNC: NEGATIVE MG/DL
RBC # UR STRIP: ABNORMAL /UL
SP GR UR: 1.01 (ref 1–1.03)
UROBILINOGEN UR QL: NORMAL

## 2023-05-24 NOTE — PROGRESS NOTES
"Chief Complaint  Renal cell adenocarcinoma, right    Subjective          Sowmya Hodges presents to [facility] UROLOGY  History of Present Illness  The patient is a 71 year old /White female who returns for a routine post-operative follow-up visit after undergoing right partial nephrectomy for right renal cell carcinoma with negative margins, stage T1a lesion on 12/02/2014.      Since the patient's surgery, she complains of no significant symptoms or problems since the surgery.      Her CXR and CT scan were normal in (August 2018).  She has no evidence of recurrence.        She had a CT scan in July 2021 that was negative.  She has no evidence of recurrence.    She had a CT scan in May 2023 that shows no evidence of recurrence.        Objective   Vital Signs:   Ht 170.2 cm (67\")   Wt 115 kg (252 lb 9.6 oz)   BMI 39.56 kg/m²       Physical Exam  Vitals and nursing note reviewed.   Constitutional:       Appearance: Normal appearance. She is well-developed.   Pulmonary:      Effort: Pulmonary effort is normal.      Breath sounds: Normal air entry.   Neurological:      Mental Status: She is alert and oriented to person, place, and time.      Motor: Motor function is intact.   Psychiatric:         Mood and Affect: Mood normal.         Behavior: Behavior normal.          Result Review :   The following data was reviewed by: Modesta Butts MD on 05/24/2023:    Results for orders placed or performed in visit on 05/24/23   POC Urinalysis Dipstick, Automated    Specimen: Urine   Result Value Ref Range    Color Yellow Yellow, Straw, Dark Yellow, [place]    Clarity, UA Clear Clear    Specific Gravity  1.010 1.005 - 1.030    pH, Urine 6.5 5.0 - 8.0    Leukocytes Negative Negative    Nitrite, UA Negative Negative    Protein, POC Negative Negative mg/dL    Glucose, UA >=1000 mg/dL (3+) (A) Negative mg/dL    Ketones, UA Negative Negative    Urobilinogen, UA Normal Normal, 0.2 E.U./dL    Bilirubin " Negative Negative    Blood, UA Trace (A) Negative    Lot Number [id number]     Expiration Date 72,024          Data reviewed: Radiologic studies CT scan:   Results    Procedure Component Value Ref Range Date/Time   CT Abdomen Pelvis With & Without Contrast [[id number]] Collected: 05/18/23 1026   Order Status: Completed Updated: 05/18/23 1030   Narrative:     PROCEDURE: CT ABDOMEN PELVIS W WO CONTRAST       COMPARISON: [facility], CT, CT ABDOMEN PELVIS W WO CONTRAST, 5/19/2022, 7:51.   INDICATIONS: Renal Cell Adenocarcinoma, right       TECHNIQUE: After obtaining the patient's consent, CT images of the abdomen were created without and   with non-ionic intravenous contrast material, and CT images of the pelvis were obtained with   non-ionic intravenous contrast material.         PROTOCOL:   Standard imaging protocol performed       RADIATION:   DLP: 418.32 mGy*cm     Automated exposure control was utilized to minimize radiation dose.   CONTRAST: 100 cc Isovue 370 I.V.   LABS:   eGFR: 73 ml/min/1.73m2       FINDINGS:   Pectus excavatum deformity.  Lung bases are clear1.01 no free air free fluid in the abdomen or   pelvis.  Enlarged liver with nodular contour question cirrhosis.  Status post cholecystectomy.  No   focal hepatic lesions.  Portal vein is well opacified.  No bile duct dilatation.  The spleen is   enlarged.  No focal splenic lesions.       The adrenal glands and pancreas are normal appearance.  There is a duodenal diverticulum at the 2nd   portion duodenum extending towards the head and uncinate process of the pancreas.  This measures up   to 2.7 centimeters.       The appendix is not well seen.  No inflammatory findings in the right lower quadrant.  Large stool   burden.  No abnormally dilated loops of bowel.  No significant bowel wall thickening.       The benign cyst superior pole right kidney.  Partial nephrectomy inferior pole right kidney.  No   suspicious renal masses.  No hydronephrosis.   Urinary bladder normal appearance.       Uterus and adnexa unremarkable.       Moderate atherosclerotic calcification abdominal aorta.  No pathologically enlarged lymph nodes.       Soft tissues are unremarkable.  Moderate degenerative disc disease L4-5 L5-S1.       Impression:       1. Findings of partial right nephrectomy.  No suspicious renal masses.  Tiny subcentimeter renal   cysts right kidney.   2. Hepatomegaly with probable cirrhosis.  Status post cholecystectomy.  Mild splenomegaly.   3. Large stool burden.                DARRIUS FERGUSON MD         Electronically Signed and Approved By: DARRIUS FERGUSON MD on 5/18/2023 at 10:26                 Assessment and Plan    Diagnoses and all orders for this visit:    1. Renal cell adenocarcinoma, right (Primary)  -     POC Urinalysis Dipstick, Automated    See her back in 2 years with a CT scan prior.  She will call sooner if she has any other issues.        Follow Up       No follow-ups on file.  Patient was given instructions and counseling regarding her condition or for health maintenance advice. Please see specific information pulled into the AVS if appropriate.

## 2023-05-30 RX ORDER — TRAZODONE HYDROCHLORIDE 50 MG/1
TABLET ORAL
Qty: 30 TABLET | Refills: 2 | Status: SHIPPED | OUTPATIENT
Start: 2023-05-30

## 2023-05-31 RX ORDER — DAPAGLIFLOZIN 10 MG/1
1 TABLET, FILM COATED ORAL DAILY
Qty: 90 TABLET | Refills: 1 | Status: SHIPPED | OUTPATIENT
Start: 2023-05-31

## 2023-05-31 RX ORDER — ATORVASTATIN CALCIUM 20 MG/1
20 TABLET, FILM COATED ORAL DAILY
Qty: 90 TABLET | Refills: 1 | Status: SHIPPED | OUTPATIENT
Start: 2023-05-31

## 2023-05-31 NOTE — TELEPHONE ENCOUNTER
"Caller: Sowmya Hodges \"Alecia\"    Relationship: Self    Best call back number: [phone]    Requested Prescriptions:   Requested Prescriptions     Pending Prescriptions Disp Refills   • atorvastatin (LIPITOR) 20 MG tablet 90 tablet 1     Sig: Take 1 tablet by mouth Daily.        Pharmacy where request should be sent: [facility]Y #[id number] - [place], [place] - [address] [facility] [phone] [facility] [phone] FX     Last office visit with prescribing clinician: 4/5/2023   Last telemedicine visit with prescribing clinician: Visit date not found   Next office visit with prescribing clinician: 6/19/2023     Does the patient have less than a 3 day supply:  [x] Yes  [] No    Would you like a call back once the refill request has been completed: [] Yes [x] No    If the office needs to give you a call back, can they leave a voicemail: [] Yes [x] No    Mounika De Santiago Rep   05/31/23 10:59 EDT       "

## 2023-06-19 ENCOUNTER — OFFICE VISIT (OUTPATIENT)
Dept: INTERNAL MEDICINE | Facility: CLINIC | Age: 73
End: 2023-06-19
Payer: MEDICARE

## 2023-06-19 VITALS
BODY MASS INDEX: 38.94 KG/M2 | HEART RATE: 67 BPM | RESPIRATION RATE: 18 BRPM | OXYGEN SATURATION: 98 % | WEIGHT: 248.13 LBS | TEMPERATURE: 98.1 F | SYSTOLIC BLOOD PRESSURE: 124 MMHG | DIASTOLIC BLOOD PRESSURE: 74 MMHG | HEIGHT: 67 IN

## 2023-06-19 DIAGNOSIS — E11.65 TYPE 2 DIABETES MELLITUS WITH HYPERGLYCEMIA, WITHOUT LONG-TERM CURRENT USE OF INSULIN: Primary | ICD-10-CM

## 2023-06-19 DIAGNOSIS — F41.9 ANXIETY: ICD-10-CM

## 2023-06-19 DIAGNOSIS — E78.5 HYPERLIPIDEMIA, UNSPECIFIED HYPERLIPIDEMIA TYPE: ICD-10-CM

## 2023-06-19 LAB
ALBUMIN SERPL-MCNC: 4.2 G/DL (ref 3.5–5.2)
ALBUMIN UR-MCNC: <1.2 MG/DL
ALBUMIN/GLOB SERPL: 1.4 G/DL
ALP SERPL-CCNC: 86 U/L (ref 39–117)
ALT SERPL W P-5'-P-CCNC: 39 U/L (ref 1–33)
ANION GAP SERPL CALCULATED.3IONS-SCNC: 10 MMOL/L (ref 5–15)
AST SERPL-CCNC: 37 U/L (ref 1–32)
BASOPHILS # BLD AUTO: 0.02 10*3/MM3 (ref 0–0.2)
BASOPHILS NFR BLD AUTO: 0.5 % (ref 0–1.5)
BILIRUB SERPL-MCNC: 0.5 MG/DL (ref 0–1.2)
BUN SERPL-MCNC: 10 MG/DL (ref 8–23)
BUN/CREAT SERPL: 12.8 (ref 7–25)
CALCIUM SPEC-SCNC: 9.4 MG/DL (ref 8.6–10.5)
CHLORIDE SERPL-SCNC: 109 MMOL/L (ref 98–107)
CHOLEST SERPL-MCNC: 139 MG/DL (ref 0–200)
CO2 SERPL-SCNC: 25 MMOL/L (ref 22–29)
CREAT SERPL-MCNC: 0.78 MG/DL (ref 0.57–1)
DEPRECATED RDW RBC AUTO: 43.4 FL (ref 37–54)
EGFRCR SERPLBLD CKD-EPI 2021: 81.8 ML/MIN/1.73
EOSINOPHIL # BLD AUTO: 0.05 10*3/MM3 (ref 0–0.4)
EOSINOPHIL NFR BLD AUTO: 1.3 % (ref 0.3–6.2)
ERYTHROCYTE [DISTWIDTH] IN BLOOD BY AUTOMATED COUNT: 13.4 % (ref 12.3–15.4)
GLOBULIN UR ELPH-MCNC: 3 GM/DL
GLUCOSE SERPL-MCNC: 123 MG/DL (ref 65–99)
HBA1C MFR BLD: 7.3 % (ref 4.8–5.6)
HCT VFR BLD AUTO: 37.9 % (ref 34–46.6)
HDLC SERPL-MCNC: 45 MG/DL (ref 40–60)
HGB BLD-MCNC: 12.7 G/DL (ref 12–15.9)
IMM GRANULOCYTES # BLD AUTO: 0.02 10*3/MM3 (ref 0–0.05)
IMM GRANULOCYTES NFR BLD AUTO: 0.5 % (ref 0–0.5)
LDLC SERPL CALC-MCNC: 75 MG/DL (ref 0–100)
LDLC/HDLC SERPL: 1.63 {RATIO}
LYMPHOCYTES # BLD AUTO: 1.3 10*3/MM3 (ref 0.7–3.1)
LYMPHOCYTES NFR BLD AUTO: 35 % (ref 19.6–45.3)
MCH RBC QN AUTO: 29.9 PG (ref 26.6–33)
MCHC RBC AUTO-ENTMCNC: 33.5 G/DL (ref 31.5–35.7)
MCV RBC AUTO: 89.2 FL (ref 79–97)
MONOCYTES # BLD AUTO: 0.24 10*3/MM3 (ref 0.1–0.9)
MONOCYTES NFR BLD AUTO: 6.5 % (ref 5–12)
NEUTROPHILS NFR BLD AUTO: 2.08 10*3/MM3 (ref 1.7–7)
NEUTROPHILS NFR BLD AUTO: 56.2 % (ref 42.7–76)
NRBC BLD AUTO-RTO: 0 /100 WBC (ref 0–0.2)
PLATELET # BLD AUTO: 244 10*3/MM3 (ref 140–450)
PMV BLD AUTO: 9.4 FL (ref 6–12)
POTASSIUM SERPL-SCNC: 4 MMOL/L (ref 3.5–5.2)
PROT SERPL-MCNC: 7.2 G/DL (ref 6–8.5)
RBC # BLD AUTO: 4.25 10*6/MM3 (ref 3.77–5.28)
SODIUM SERPL-SCNC: 144 MMOL/L (ref 136–145)
TRIGL SERPL-MCNC: 104 MG/DL (ref 0–150)
TSH SERPL DL<=0.05 MIU/L-ACNC: 1.05 UIU/ML (ref 0.27–4.2)
VLDLC SERPL-MCNC: 19 MG/DL (ref 5–40)
WBC NRBC COR # BLD: 3.71 10*3/MM3 (ref 3.4–10.8)

## 2023-06-19 RX ORDER — BUSPIRONE HYDROCHLORIDE 15 MG/1
15 TABLET ORAL 3 TIMES DAILY PRN
Qty: 270 TABLET | Refills: 1 | Status: SHIPPED | OUTPATIENT
Start: 2023-06-19

## 2023-06-19 RX ORDER — CLONAZEPAM 1 MG/1
1 TABLET ORAL 2 TIMES DAILY PRN
Qty: 60 TABLET | Refills: 2 | Status: SHIPPED | OUTPATIENT
Start: 2023-06-19

## 2023-06-19 RX ORDER — LEVETIRACETAM 750 MG/1
750 TABLET ORAL 2 TIMES DAILY
Qty: 180 TABLET | Refills: 1 | Status: SHIPPED | OUTPATIENT
Start: 2023-06-19

## 2023-06-19 NOTE — PROGRESS NOTES
"Chief Complaint  Follow-up (6 month follow up for diabetes /Also needs refills on keppra and klonopin, wants to talk about not sleeping well still ) and Diabetes    Subjective       Sowmya Hodges presents to [facility]    HPI   Presenting for 6 month follow up of chronic conditions.     DM: well controlled on recent labs, A1C 6.4%, denies numbness/tingling, vision changes, urinary changes, dizziness, nausea    HLD: on statin, tolerating well, denies muscle pain/weakness    Not sleeping well.     Objective     Vitals:    06/19/23 0926   BP: 124/74   BP Location: Left arm   Patient Position: Sitting   Cuff Size: Adult   Pulse: 67   Resp: 18   Temp: 98.1 °F (36.7 °C)   TempSrc: Temporal   SpO2: 98%   Weight: 113 kg (248 lb 2 oz)   Height: 170.2 cm (67\")      Wt Readings from Last 3 Encounters:   06/19/23 113 kg (248 lb 2 oz)   05/24/23 115 kg (252 lb 9.6 oz)   04/17/23 111 kg (245 lb)      BP Readings from Last 3 Encounters:   06/19/23 124/74   04/17/23 122/54   04/05/23 122/66        Body mass index is 38.86 kg/m².    Class 2 Severe Obesity (BMI >=35 and <=39.9). Obesity-related health conditions include the following: diabetes mellitus and dyslipidemias. Obesity is unchanged. BMI is is above average; BMI management plan is completed. We discussed portion control and increasing exercise.       Physical Exam     Result Review :   The following data was reviewed by: Romi Rubio MD on 06/19/2023:  CMP          8/25/2022    10:19 12/19/2022    15:26   CMP   Glucose  154    BUN  15    Creatinine  0.78    EGFR  81.8    Sodium  141    Potassium  3.9    Chloride  106    Calcium  9.3    Total Protein 7.0  6.7    Albumin 4.10  4.10    Globulin  2.6    Total Bilirubin 0.4  0.4    Alkaline Phosphatase 104  100    AST (SGOT) 53  34    ALT (SGPT) 52  38    Albumin/Globulin Ratio  1.6    BUN/Creatinine Ratio  19.2    Anion Gap  6.3      CBC          8/25/2022    10:19 " 12/19/2022    15:26   CBC   WBC 4.51  4.76    RBC 4.12  3.91    Hemoglobin 12.5  11.9    Hematocrit 37.9  36.1    MCV 92.0  92.3    MCH 30.3  30.4    MCHC 33.0  33.0    RDW 13.5  13.1    Platelets 241  218      Lipid Panel          12/19/2022    15:26   Lipid Panel   Total Cholesterol 153    Triglycerides 172    HDL Cholesterol 47    VLDL Cholesterol 29    LDL Cholesterol  77    LDL/HDL Ratio 1.52      TSH          12/19/2022    15:26   TSH   TSH 1.130      A1C Last 3 Results          12/19/2022    15:26   HGBA1C Last 3 Results   Hemoglobin A1C 6.40            Procedures    Assessment and Plan   Diagnoses and all orders for this visit:    1. Type 2 diabetes mellitus with hyperglycemia, without long-term current use of insulin (Primary)  Assessment & Plan:  Previously well controlled  Checking follow up labs today    Orders:  -     CBC & Differential  -     Comprehensive Metabolic Panel  -     Hemoglobin A1c  -     MicroAlbumin, Urine, Random - Urine, Clean Catch  -     TSH    2. Anxiety  Assessment & Plan:  Taking Buspar and Clonazepam as needed for symptoms.    Orders:  -     busPIRone (BUSPAR) 15 MG tablet; Take 1 tablet by mouth 3 (Three) Times a Day As Needed (Anxiety).  Dispense: 270 tablet; Refill: 1  -     clonazePAM (KlonoPIN) 1 MG tablet; Take 1 tablet by mouth 2 (Two) Times a Day As Needed for Anxiety.  Dispense: 60 tablet; Refill: 2    3. Hyperlipidemia, unspecified hyperlipidemia type  Assessment & Plan:  On statin, tolerating well  Checking follow up labs today    Orders:  -     Lipid Panel    Other orders  -     levETIRAcetam (KEPPRA) 750 MG tablet; Take 1 tablet by mouth 2 (Two) Times a Day.  Dispense: 180 tablet; Refill: 1          Follow Up   Return in about 6 months (around 12/19/2023) for Next scheduled follow up.  Patient was given instructions and counseling regarding her condition or for health maintenance advice. Please see specific information pulled into the AVS if appropriate.

## 2023-08-19 RX ORDER — TRAZODONE HYDROCHLORIDE 50 MG/1
TABLET ORAL
Qty: 30 TABLET | Refills: 2 | Status: SHIPPED | OUTPATIENT
Start: 2023-08-19

## 2023-08-23 ENCOUNTER — TELEPHONE (OUTPATIENT)
Dept: GASTROENTEROLOGY | Facility: CLINIC | Age: 73
End: 2023-08-23
Payer: MEDICARE

## 2023-08-23 NOTE — TELEPHONE ENCOUNTER
Radha from the hub called and said that patient wanted to reschedule her appointment. When I spoke with patient, she didn't want to reschedule her appointment. She just wanted to know if she could come in a little bit earlier than 9:15.

## 2023-08-31 ENCOUNTER — TELEPHONE (OUTPATIENT)
Dept: GASTROENTEROLOGY | Facility: CLINIC | Age: 73
End: 2023-08-31
Payer: MEDICARE

## 2023-09-01 ENCOUNTER — PROCEDURE VISIT (OUTPATIENT)
Dept: OTHER | Facility: HOSPITAL | Age: 73
End: 2023-09-01
Payer: MEDICARE

## 2023-09-07 ENCOUNTER — OFFICE VISIT (OUTPATIENT)
Dept: GASTROENTEROLOGY | Facility: CLINIC | Age: 73
End: 2023-09-07
Payer: MEDICARE

## 2023-09-07 VITALS
BODY MASS INDEX: 38.58 KG/M2 | HEIGHT: 67 IN | SYSTOLIC BLOOD PRESSURE: 105 MMHG | DIASTOLIC BLOOD PRESSURE: 58 MMHG | HEART RATE: 68 BPM | WEIGHT: 245.8 LBS

## 2023-09-07 DIAGNOSIS — R12 HEARTBURN: ICD-10-CM

## 2023-09-07 DIAGNOSIS — K76.0 FATTY LIVER: Primary | ICD-10-CM

## 2023-09-07 DIAGNOSIS — K74.69 OTHER CIRRHOSIS OF LIVER: ICD-10-CM

## 2023-09-07 DIAGNOSIS — R79.89 ELEVATED LFTS: ICD-10-CM

## 2023-09-07 DIAGNOSIS — K58.9 IRRITABLE BOWEL SYNDROME, UNSPECIFIED TYPE: ICD-10-CM

## 2023-09-07 PROBLEM — I51.9 HEART DISEASE: Status: ACTIVE | Noted: 2023-09-07

## 2023-09-07 PROBLEM — E66.3 OVERWEIGHT: Status: ACTIVE | Noted: 2023-04-24

## 2023-09-07 PROBLEM — E11.42 DIABETIC PERIPHERAL NEUROPATHY: Status: ACTIVE | Noted: 2019-06-28

## 2023-09-07 PROBLEM — J33.8 POLYP, SINUS MAXILLARY: Status: ACTIVE | Noted: 2023-09-07

## 2023-09-07 PROBLEM — S82.899A CLOSED FRACTURE OF ANKLE: Status: ACTIVE | Noted: 2017-09-26

## 2023-09-07 PROBLEM — M47.816 LUMBAR SPONDYLOSIS: Status: ACTIVE | Noted: 2018-11-08

## 2023-09-07 PROBLEM — R06.02 SHORTNESS OF BREATH: Status: ACTIVE | Noted: 2023-09-07

## 2023-09-07 PROBLEM — R10.30 LOWER ABDOMINAL PAIN: Status: ACTIVE | Noted: 2023-09-07

## 2023-09-07 PROBLEM — I10 HTN (HYPERTENSION): Status: ACTIVE | Noted: 2023-09-07

## 2023-09-07 PROBLEM — M79.89 LIMB SWELLING: Status: ACTIVE | Noted: 2023-09-07

## 2023-09-07 PROBLEM — G47.33 OSA (OBSTRUCTIVE SLEEP APNEA): Status: ACTIVE | Noted: 2023-09-07

## 2023-09-07 PROBLEM — D64.9 ANEMIA: Status: ACTIVE | Noted: 2023-09-07

## 2023-09-07 PROBLEM — M51.369 DEGENERATION OF LUMBAR INTERVERTEBRAL DISC: Status: ACTIVE | Noted: 2018-11-08

## 2023-09-07 PROBLEM — K57.92 DIVERTICULITIS: Status: ACTIVE | Noted: 2023-09-07

## 2023-09-07 PROBLEM — M19.90 ARTHRITIS: Status: ACTIVE | Noted: 2023-09-07

## 2023-09-07 PROBLEM — M51.36 DEGENERATION OF LUMBAR INTERVERTEBRAL DISC: Status: ACTIVE | Noted: 2018-11-08

## 2023-09-07 RX ORDER — OXYCODONE AND ACETAMINOPHEN 7.5; 325 MG/1; MG/1
TABLET ORAL
COMMUNITY
Start: 2023-08-24

## 2023-09-07 NOTE — PROGRESS NOTES
Liver Elastography  Performed by: Carmen Dillon APRN  Authorized by: Chloe Euceda APRN   Ordering Provider: Chloe Euceda APRN    Probe:  XL+  Procedure Details:  Procedure: After providing an oral and written explanation of the Fibroscan vibration controlled transient elastography (VTCE) test procedure to the patient. The patient was placed in supine position with right arm in maximum abduction to allow optimal exposure of right lateral abdomen. Patient was briefly assessed, identifying terminus of the xyphoid process and locating an ideal transient elastography testing site, midline and lateral to this point. Patient was instructed to breathe normally and remain stationary during the test process. Pre-measurement data confirmed the transient elastography probe was centered over the liver parenchyma. A series of ten 50 Hz mechanical pulses were applied with controlled application pressure to induce a mechanical shear wave in the liver tissue. For each measurement, the shear wave propagation speed was detected, displayed and converted to its equivalent liver stiffness value in kilopascals. Skin to liver capsules distance and shear wave characteristics were monitored during the entire examination to assure quality data. Median liver stiffness measurement and interquartile range were calculated and displayed in real time. Acquired measurement data was stored and submitted to the provider for review and interpretation. Patient tolerated the procedure well and was discharged without incident.   Clinical Information:     NPO 3 Hours or More: Yes      Actively Drinking: No    Findings:     Median Liver Stiffness Score:  10.6    Interquartile Range (IQR) to Median Ratio:  17    Fatimah Stiffness Consistent with:  F3 Significant Fibrosis    Current Scan Considered Reliab: Yes      Median Controlled Attenuation Parameter (dB/m):  356    IQR:  14    CAP SCORE:  Moderate/severe liver fat

## 2023-09-07 NOTE — PROGRESS NOTES
Patient Name: Sowmya Hodges   Visit Date: 09/07/2023   Patient ID: [id number]  Provider: LORENZO Paz    Sex: female  Location:  Location Address:  Location Phone: [address]  [place] [address] [phone]    YOB: 1950  Age: 73 y.o.   Primary Care Provider Romi Rubio MD      Referring Provider: No ref. provider found        Chief Complaint  Fatty Liver  (Follow up )    History of Present Illness    Patient initially presented in 2017 with abdominal pain and heartburn.  GES December 2017 within normal limits.  PMH significant for renal cell carcinoma 2014 status post partial right nephrectomy, follows w DR Butts.  History of fatty liver seen on CT scan, hepatic work-up has been completed 4/2020 and negative.  Patient did report a cousin with cirrhosis and was told he had a genetic issue      History of intermittent diarrhea-Xifaxan improved symptoms February 2022      EGD for persistent nausea vomiting 6/9/2022-esophagus normal, gastritis noted-biopsy with mild chronic inactive gastritis, normal duodenum, Carafate was given      Colonoscopy 1/17/2023 for lower abd pain/cramping : good bowel prep, small polyp in the ascending colon-adenomatous, repeat 5 yrs, random colon bx negative     CT abdomen and pelvis with and without contrast 5/17/2023: Enlarged liver with nodular contour questionable cirrhosis, mild splenomegaly, large stool burden, no suspicious renal masses     Patient was last seen 7/3/2023, reported no longer having diarrhea but crampy discomfort after eating, she did not remember taking Levsin she did feel like Xifaxan helped improve symptom of diarrhea, she requested to try Bentyl again.  This was prescribed along with Protonix.-Protonix increased to 40 mg/day.  FibroScan ordered for CT findings showing possible cirrhosis     9/1/2023 Fibroscan S3, F3    Pt has not been having diarrhea, cramping better. States has had some this morning she feels  related to not eating and related distressed.  Patient is nervous about her FibroScan results.  Pt has not been having as much pain w meals, feels Bentyl has helped, does have early satiety, no HB w Protonix, feels better with 40 mg/d.  Past Medical History:   Diagnosis Date    Anemia     Anxiety     Arthritis     Constipation     Depression     Diabetes     Diverticulitis     Epilepsy     Fracture of distal fibula 09/26/2017    right    GERD (gastroesophageal reflux disease)     Heart disease     HTN (hypertension)     Hyperlipidemia     Limb swelling     Lower abdominal pain     TAYLOR (obstructive sleep apnea)     Polyp, sinus maxillary     Renal cell adenocarcinoma, right 12/10/2014    Seasonal allergies     Shortness of breath        Past Surgical History:   Procedure Laterality Date    CARDIAC SURGERY      open heart at age 4     CARDIAC VALVE SURGERY      CHOLECYSTECTOMY      COLONOSCOPY  04/12/2017    dr monique    COLONOSCOPY N/A 1/17/2023    Procedure: COLONOSCOPY WITH BIOPSIES, POLYPECTOMY;  Surgeon: Sean Monique MD;  Location: [facility];  Service: Gastroenterology;  Laterality: N/A;  COLON POLYP    ENDOSCOPY  04/12/2017    dr monique    ENDOSCOPY N/A 6/9/2022    Procedure: ESOPHAGOGASTRODUODENOSCOPY with biopsy;  Surgeon: Sean Monique MD;  Location: [facility] ENDOSCOPY;  Service: Gastroenterology;  Laterality: N/A;  gastritis    EYE SURGERY      implant    NEPHRECTOMY PARTIAL Right 12/02/2014    robotic right partial nephrectomy w dr garvey    OTHER SURGICAL HISTORY      joint surgery    SALIVARY GLAND SURGERY      TONSILLECTOMY      TOTAL KNEE ARTHROPLASTY Right     UPPER GASTROINTESTINAL ENDOSCOPY  12/22/2020    Dr. Monique    WRIST SURGERY Left        Allergies   Allergen Reactions    Codeine Rash              Family History   Problem Relation Age of Onset    Arthritis Mother     Arthritis Father     Cancer Father     Arthritis Sister     Arthritis Brother     Colon cancer Neg Hx      "Malig Hyperthermia Neg Hx         Social History     Tobacco Use    Smoking status: Former     Types: Cigarettes     Quit date: 2000     Years since quittin.6    Smokeless tobacco: Never    Tobacco comments:     Less than 5 years   Vaping Use    Vaping Use: Never used   Substance Use Topics    Alcohol use: Never     Comment: drinks less than 1 drink per day     Drug use: Never       Objective     Vital Signs:   /58 (BP Location: Left arm, Patient Position: Sitting, Cuff Size: Adult)   Pulse 68   Ht 170.2 cm (67\")   Wt 111 kg (245 lb 12.8 oz)   BMI 38.50 kg/m²       Physical Exam  Constitutional:       General: The patient is not in acute distress.     Appearance: Normal appearance.   HENT:      Head: Normocephalic and atraumatic.      Nose: Nose normal.   Pulmonary:      Effort: Pulmonary effort is normal. No respiratory distress.   Abdominal:      General: Abdomen is flat.      Palpations: Abdomen is soft. There is no mass.      Tenderness: There is no abdominal tenderness. There is no guarding.   Musculoskeletal:      Cervical back: Neck supple.      Right lower leg: No edema.      Left lower leg: No edema.   Skin:     General: Skin is warm and dry.   Neurological:      General: No focal deficit present.      Mental Status: The patient is alert and oriented to person, place, and time.      Gait: Gait normal.   Psychiatric:         Mood and Affect: Mood normal.         Speech: Speech normal.         Behavior: Behavior normal.         Thought Content: Thought content normal.     Result Review :   The following data was reviewed by: LORENZO Paz on 2023:    CBC w/diff          2022    15:26 2023    10:02   CBC w/Diff   WBC 4.76  3.71    RBC 3.91  4.25    Hemoglobin 11.9  12.7    Hematocrit 36.1  37.9    MCV 92.3  89.2    MCH 30.4  29.9    MCHC 33.0  33.5    RDW 13.1  13.4    Platelets 218  244    Neutrophil Rel % 54.9  56.2    Immature Granulocyte Rel % 0.0  0.5  "   Lymphocyte Rel % 36.1  35.0    Monocyte Rel % 7.1  6.5    Eosinophil Rel % 1.5  1.3    Basophil Rel % 0.4  0.5      CMP          12/19/2022    15:26 6/19/2023    10:02   CMP   Glucose 154  123    BUN 15  10    Creatinine 0.78  0.78    EGFR 81.8  81.8    Sodium 141  144    Potassium 3.9  4.0    Chloride 106  109    Calcium 9.3  9.4    Total Protein 6.7  7.2    Albumin 4.10  4.2    Globulin 2.6  3.0    Total Bilirubin 0.4  0.5    Alkaline Phosphatase 100  86    AST (SGOT) 34  37    ALT (SGPT) 38  39    Albumin/Globulin Ratio 1.6  1.4    BUN/Creatinine Ratio 19.2  12.8    Anion Gap 6.3  10.0        Liver Workup   A-1 Antitrypsin   Date Value Ref Range Status   04/23/2020 145 101 - 187 mg/dL Final     ds DNA Antibody   Date Value Ref Range Status   04/23/2020 NEGATIVE [IU]/mL Final     Ceruloplasmin   Date Value Ref Range Status   04/23/2020 28.2 19.0 - 39.0 mg/dL Final     Ferritin   Date Value Ref Range Status   04/23/2020 201 (H) 10 - 200 ng/mL Final     Comment:     <10 ng/ml usually associated with Iron Deficiency Anemia.  Above normal range levels may be due to Hepatic and/or  Chronic Inflammatory Disease.       Immunofixation Result, Serum   Date Value Ref Range Status   04/23/2020 COMMENT NA Final     Comment:     No monoclonality detected.     IgA   Date Value Ref Range Status   04/23/2020 164 87 - 352 mg/dL Final     Iron   Date Value Ref Range Status   04/23/2020 58 (L) 60 - 170 ug/dL Final     TIBC   Date Value Ref Range Status   04/23/2020 399 245 - 450 ug/dL Final     Comment:     As of 10/15/03, the chemistry department began utilizing a Transferrin  assay. Data suggests that Transferrin is a better estimate of Total Iron  binding capacity than the TIBC assay. As a result the TIBC will now be a  calculated estimate from the measured Transferrin.       Iron Saturation   Date Value Ref Range Status   04/23/2020 15 (L) 20 - 55 % Final     Transferrin   Date Value Ref Range Status   04/23/2020 279.00 250.00  - 380.00 mg/dL Final     Mitochondrial Ab   Date Value Ref Range Status   04/23/2020 <20.0 0.0 - 20.0 Units Final     Comment:                                                    Negative    0.0 - 20.0                                                 Equivocal  20.1 - 24.9                                                 Positive         >24.9                 Mitochondrial (M2) Antibodies are found in 90-96% of                 patients with primary biliary cirrhosis.       Protime   Date Value Ref Range Status   08/25/2022 12.9 11.8 - 14.9 Seconds Final     INR   Date Value Ref Range Status   08/25/2022 0.96 0.86 - 1.15 Final     AFP   Date Value Ref Range Status   04/23/2020 1.1 0.0 - 8.7 ng/mL Final     Comment:     Test Information: AFP (Tumor Marker)      The Roche e601 by ECLIA method was used to perform this  test. Results obtained with different assay methods cannot be  used interchangeably.  This result is not absolute evidence of the presence or absence  of Malignant disease. AFP results should be considered with  clinical evaluation and other diagnostic procedures.  ---------------------------------------------------------------  AFP Values in Benign and Malignant Disease  Sample Category  N   0-8.7  8.8-15.0 15.1-20.0 20.1-100  >100  Appar Healthy   140   136     4          0         0       0  males            70   68      2          0         0       0  females          70   68      2          0         0       0  Malig.Diseases  165   112     6          4        15      28  Test.Cancer  -seminoma        9     6      1          0         2       0  -non-seminoma    62   22      3          4        11      22  Liver Cancer     12    8      0          0         1       3  Other Cancer  -colorectal.     13   12      1          0         0       0  -genitourinary   36   35      1          0         0       0  -ovarian          8    6      0          0         0       2  -pancreatic       8    8      0           0         0       0  -breast           5    5      0          0         0       0  -stomach          2    1      0          0         1       0  -other           10    9      0          0         0       1  Benign Diseases  76   74      1          0         1       0  -cirrhosis       16   16      0          0         0       0  -hepatitis       17   15      1          0         1       0  -pancreatitis     5    5      0          0         0       0  -gi and pid      10   10      0          0         0       0  -BPH             21   21      0          0         0       0  -urogenital       7    7      0          0         0       0  In this study,95% of the apparently healthy subjects had  AFP values < or =8.30 ng/ml.  -------------------------------------------------------------       Acute HEP Panel   Hep A IgM   Date Value Ref Range Status   04/23/2020 Negative Negative NA Final     Hep B Core IgM   Date Value Ref Range Status   04/23/2020 Negative Negative NA Final               Assessment and Plan    Diagnoses and all orders for this visit:    1. Fatty liver (Primary)  -     CBC (No Diff); Future  -     Hepatic Function Panel; Future  -     Protime-INR; Future  -     US Liver; Future    2. Elevated LFTs  -     CBC (No Diff); Future  -     Hepatic Function Panel; Future  -     Protime-INR; Future  -     US Liver; Future    3. Irritable bowel syndrome, unspecified type    4. Other cirrhosis of liver    5. Heartburn    Other orders  -     hepatitis A-hepatitis B (TWINRIX) 720-20 ELU-MCG/ML injection; Inject 1 mL into the appropriate muscle as directed by prescriber 1 (One) Time for 1 dose. Repeat 6 months  Dispense: 1 mL; Refill: 1              Follow Up   Return in about 6 months (around 3/7/2024).  Liver US in Nov   Labs in Dec   Hepatitis A & B vaccines will send Rx   Explained cirrhosis is suspected with CT findings and splenomegaly, FibroScan of F3 supports chronic liver disease as well.  Patient is well  compensated at this time.  Discussed with patient the importance of low-carb diet, 10% weight loss, 2 to 4 cups of coffee per day, monitoring with liver ultrasound and labs every 6 months.  Call if abd pain returns, cont Bentyl     Patient was given instructions and counseling regarding her condition or for health maintenance advice. Please see specific information pulled into the AVS if appropriate.

## 2023-09-12 ENCOUNTER — TELEPHONE (OUTPATIENT)
Dept: GASTROENTEROLOGY | Facility: CLINIC | Age: 73
End: 2023-09-12
Payer: MEDICARE

## 2023-09-12 NOTE — TELEPHONE ENCOUNTER
Hub staff attempted to follow warm transfer process and was unsuccessful     Caller: MADDIE CARBONE    Best call back number: [phone]    Patient is needing: PT WANTING TO KNOW IF THERE IS A DIET SHE CAN FOLLOW, PARTICULARLY WHAT CARBS ARE GOOD TO EAT AND WHICH ONES TO AVOID.

## 2023-09-13 NOTE — TELEPHONE ENCOUNTER
Pt called to make sure when to complete labs, I informed pt a week or so before her next OV, pt agreeable.   PERRL/conjunctiva clear

## 2023-09-25 RX ORDER — LEVOCETIRIZINE DIHYDROCHLORIDE 5 MG/1
TABLET, FILM COATED ORAL
Qty: 90 TABLET | Refills: 1 | Status: SHIPPED | OUTPATIENT
Start: 2023-09-25

## 2023-09-27 ENCOUNTER — NUTRITION (OUTPATIENT)
Dept: DIABETES SERVICES | Facility: HOSPITAL | Age: 73
End: 2023-09-27
Payer: MEDICARE

## 2023-09-27 DIAGNOSIS — K76.0 FATTY LIVER: Primary | ICD-10-CM

## 2023-09-27 DIAGNOSIS — E66.01 CLASS 2 SEVERE OBESITY WITH SERIOUS COMORBIDITY AND BODY MASS INDEX (BMI) OF 39.0 TO 39.9 IN ADULT, UNSPECIFIED OBESITY TYPE: ICD-10-CM

## 2023-09-27 DIAGNOSIS — E11.65 TYPE 2 DIABETES MELLITUS WITH HYPERGLYCEMIA, WITHOUT LONG-TERM CURRENT USE OF INSULIN: ICD-10-CM

## 2023-09-27 PROCEDURE — 97802 MEDICAL NUTRITION INDIV IN: CPT | Performed by: DIETITIAN, REGISTERED

## 2023-10-25 ENCOUNTER — OFFICE VISIT (OUTPATIENT)
Dept: ORTHOPEDIC SURGERY | Facility: CLINIC | Age: 73
End: 2023-10-25
Payer: MEDICARE

## 2023-10-25 VITALS
BODY MASS INDEX: 34.69 KG/M2 | HEART RATE: 62 BPM | SYSTOLIC BLOOD PRESSURE: 104 MMHG | DIASTOLIC BLOOD PRESSURE: 69 MMHG | OXYGEN SATURATION: 95 % | HEIGHT: 67 IN | WEIGHT: 221 LBS

## 2023-10-25 DIAGNOSIS — M65.332 TRIGGER MIDDLE FINGER OF LEFT HAND: Primary | ICD-10-CM

## 2023-10-25 RX ORDER — TRIAMCINOLONE ACETONIDE 40 MG/ML
40 INJECTION, SUSPENSION INTRA-ARTICULAR; INTRAMUSCULAR
Status: COMPLETED | OUTPATIENT
Start: 2023-10-25 | End: 2023-10-25

## 2023-10-25 RX ORDER — LIDOCAINE HYDROCHLORIDE 10 MG/ML
1 INJECTION, SOLUTION INFILTRATION; PERINEURAL
Status: COMPLETED | OUTPATIENT
Start: 2023-10-25 | End: 2023-10-25

## 2023-10-25 RX ORDER — DICLOFENAC SODIUM 75 MG/1
75 TABLET, DELAYED RELEASE ORAL 2 TIMES DAILY
Qty: 60 TABLET | Refills: 1 | Status: SHIPPED | OUTPATIENT
Start: 2023-10-25

## 2023-10-25 RX ADMIN — LIDOCAINE HYDROCHLORIDE 1 ML: 10 INJECTION, SOLUTION INFILTRATION; PERINEURAL at 10:15

## 2023-10-25 RX ADMIN — TRIAMCINOLONE ACETONIDE 40 MG: 40 INJECTION, SUSPENSION INTRA-ARTICULAR; INTRAMUSCULAR at 10:15

## 2023-10-25 NOTE — PROGRESS NOTES
"Chief Complaint  Pain and Initial Evaluation of the Left Hand    Subjective          Sowmya Hodges presents to [facility] for   History of Present Illness    The patient presents here today for evaluation of the left hand. The patient reports triggering and locking to the left 3rd finger. She denies injury or trauma. She has no other complaints. She denies numbness and tingling.     Allergies   Allergen Reactions    Codeine Rash               Social History     Socioeconomic History    Marital status: Single   Tobacco Use    Smoking status: Former     Types: Cigarettes     Quit date:      Years since quittin.8    Smokeless tobacco: Never    Tobacco comments:     Less than 5 years   Vaping Use    Vaping Use: Never used   Substance and Sexual Activity    Alcohol use: Never     Comment: drinks less than 1 drink per day     Drug use: Never    Sexual activity: Not Currently     Partners: Male     Birth control/protection: None        I reviewed the patient's chief complaint, history of present illness, review of systems, past medical history, surgical history, family history, social history, medications, and allergy list.     REVIEW OF SYSTEMS    Constitutional: Denies fevers, chills, weight loss  Cardiovascular: Denies chest pain, shortness of breath  Skin: Denies rashes, acute skin changes  Neurologic: Denies headache, loss of consciousness  MSK: Left hand pain      Objective   Vital Signs:   /69   Pulse 62   Ht 170.2 cm (67\")   Wt 100 kg (221 lb)   SpO2 95%   BMI 34.61 kg/m²     Body mass index is 34.61 kg/m².    Physical Exam    General: Alert. No acute distress.   Left hand- active triggering and locking to the 3rd finger. Neurovascularly intact. Sensation to light touch median, radial, ulnar nerve. Positive AIN, PIN, ulnar nerve motor function. Positive pulses. Tender to the A-1 pulley of the left 3rd finger.     Small Joint Arthrocentesis  Consent given by: " patient  Site marked: site marked  Timeout: Immediately prior to procedure a time out was called to verify the correct patient, procedure, equipment, support staff and site/side marked as required   Procedure Details  Location: long finger (LEFT) -   Preparation: Patient was prepped and draped in the usual sterile fashion  Needle gauge: 23 G.  Medications administered: 1 mL lidocaine 1 %; 40 mg triamcinolone acetonide 40 MG/ML  Patient tolerance: patient tolerated the procedure well with no immediate complications      Imaging Results (Most Recent)       None                     Assessment and Plan        No results found.     Diagnoses and all orders for this visit:    1. Trigger middle finger of left hand (Primary)        Discussed the treatment plan with the patient.  Discussed the risks and benefits of left 3rd finger trigger finger injection. The patient expressed understanding and wished to proceed. She tolerated the injection well. Home exercises given today.       Call or return if worsening symptoms.    Scribed for Mustapha Alfaro MD by Arielle Montanez  10/25/2023   10:48 EDT         Follow Up       3 months    Patient was given instructions and counseling regarding her condition or for health maintenance advice. Please see specific information pulled into the AVS if appropriate.       I have personally performed the services described in this document as scribed by the above individual and it is both accurate and complete.     Mustapha Alfaro MD  10/25/23  10:56 EDT

## 2023-10-26 RX ORDER — PANTOPRAZOLE SODIUM 40 MG/1
40 TABLET, DELAYED RELEASE ORAL DAILY
Qty: 90 TABLET | Refills: 1 | Status: SHIPPED | OUTPATIENT
Start: 2023-10-26

## 2023-10-26 RX ORDER — LISINOPRIL 2.5 MG/1
TABLET ORAL
Qty: 90 TABLET | Refills: 1 | Status: SHIPPED | OUTPATIENT
Start: 2023-10-26

## 2023-10-26 RX ORDER — ESCITALOPRAM OXALATE 20 MG/1
TABLET ORAL
Qty: 90 TABLET | Refills: 1 | Status: SHIPPED | OUTPATIENT
Start: 2023-10-26

## 2023-10-26 RX ORDER — SEMAGLUTIDE 1.34 MG/ML
1 INJECTION, SOLUTION SUBCUTANEOUS WEEKLY
Qty: 9 ML | Refills: 3 | Status: SHIPPED | OUTPATIENT
Start: 2023-10-26

## 2023-10-31 ENCOUNTER — TELEPHONE (OUTPATIENT)
Dept: INTERNAL MEDICINE | Facility: CLINIC | Age: 73
End: 2023-10-31
Payer: MEDICARE

## 2023-10-31 NOTE — TELEPHONE ENCOUNTER
"  Caller: Sowmya Hodges \"Alecia\"    Relationship: Self    Best call back number: [phone]    What is the best time to reach you: ANY    Who are you requesting to speak with (clinical staff, provider,  specific staff member): CLINICAL    What was the call regarding: PATIENT STATED SHE IS HAVING DIFFICULTY FINDING MEDICATION OZEMPIC, SHE CALLED [facility] BUT THEY DO NOT TAKE HER HEALTH INSURANCE SO THE PRICE IS TOO HIGH. SHE WOULD LIKE ADVICE ON WHAT TO DO BECAUSE SHE DOESN'T KNOW WHAT TO DO AT THIS POINT.       "

## 2023-11-01 NOTE — TELEPHONE ENCOUNTER
There is a shortage on this medication and there are no pharmacies that have it in stock. Please advise.

## 2023-11-02 ENCOUNTER — HOSPITAL ENCOUNTER (OUTPATIENT)
Dept: ULTRASOUND IMAGING | Facility: HOSPITAL | Age: 73
Discharge: HOME | End: 2023-11-02
Admitting: NURSE PRACTITIONER
Payer: MEDICARE

## 2023-11-02 DIAGNOSIS — K76.0 FATTY LIVER: ICD-10-CM

## 2023-11-02 DIAGNOSIS — R79.89 ELEVATED LFTS: ICD-10-CM

## 2023-11-02 PROCEDURE — 76705 ECHO EXAM OF ABDOMEN: CPT

## 2023-11-02 RX ORDER — TIRZEPATIDE 2.5 MG/.5ML
2.5 INJECTION, SOLUTION SUBCUTANEOUS WEEKLY
Qty: 2 ML | Refills: 0 | Status: SHIPPED | OUTPATIENT
Start: 2023-11-02

## 2023-11-10 ENCOUNTER — TELEPHONE (OUTPATIENT)
Dept: INTERNAL MEDICINE | Facility: CLINIC | Age: 73
End: 2023-11-10
Payer: MEDICARE

## 2023-11-10 VITALS — HEIGHT: 67 IN | BODY MASS INDEX: 36.99 KG/M2 | WEIGHT: 235.67 LBS

## 2023-11-10 NOTE — PROGRESS NOTES
"  Sowmya Hodges is a 73 y.o. female who presents to [facility] for gastro nutrition consult r/t diagnosis of fatty liver, obesity BMI 39-39.9.  Pt also states she has dx/o T2DM, diagnosed 3-4 years ago.  Sowmya Hodges is referred by LOERNZO Powell.    Past Medical History:   Diagnosis Date    Anemia     Anxiety     Arthritis     Constipation     Depression     Diabetes     Diverticulitis     Epilepsy     Fracture of distal fibula 09/26/2017    right    GERD (gastroesophageal reflux disease)     Heart disease     HTN (hypertension)     Hyperlipidemia     Limb swelling     Lower abdominal pain     TAYLOR (obstructive sleep apnea)     Polyp, sinus maxillary     Renal cell adenocarcinoma, right 12/10/2014    Seasonal allergies     Shortness of breath        Anthropometrics    170.2 cm (67\")  107 kg (235 lb 10.8 oz)  36.91 kg/m²    Noted 10# wt loss from previous provider visit.    Diabetes History    Diabetes History  What type of diabetes do you have?: Type 2  Length of Diabetes Diagnosis: 1 - 5 years  Current DM knowledge: good  Have you had diabetes education/teaching in the past?: no  Do you test your blood sugar at home?: yes  Typical readings: fasting 130s, bedtime 140s    Education Preferences    Education Preferences  What areas of diabetes would you like to learn about?: diet information    Nutrition Information    Nutrition Information  Enter everything you can remember eating in the last 24 hours (1 day): breakfast- eggs, frias; lunch- tuna on keto bread; dinner- grilled chicken salad; snacks- apple, usually nothing after 6pm; beverages- black coffee, water, unsweet tea, no sodas  What is the biggest challenge you have with your diet?: Knowledge    Education Needs    DM Education Needs  Meter: Has own  Medication: Oral, Other injectables  Healthy Eating: RD consult, Reviewed meal plan, Basic meal plan provided  Physical Activity: Walking  Physical Activity Frequency: " Regularly  Motivation: Engaged  Teaching Method: Explanation, Discussion, Handouts  Patient Response: Verbalized understanding    DM Goals    DM Goals  Healthy Eating - Goal: Today  Being Active - Goal: Today  Taking Medication - Goal: Today  Monitoring - Goal: Today      Medications    Current Outpatient Medications   Medication    atorvastatin    Accu-Chek Sabrina Plus    busPIRone    clonazePAM    diclofenac    Diclofenac Sodium    dicyclomine    escitalopram    Farxiga    fluticasone    glucose blood    glucose blood    HYDROcodone-acetaminophen    Pen Needles    levETIRAcetam    levocetirizine    lidocaine    lisinopril    oxyCODONE-acetaminophen    pantoprazole    Mounjaro    traZODone    vitamin D3     Labs       Lab Results   Component Value Date    CHOL 139 06/19/2023    TRIG 104 06/19/2023    HDL 45 06/19/2023    LDL 75 06/19/2023 June 2023 A1c 7.3%    Nutrition counseling provided on carbohydrate counting, limited intake of fats, portion control, measuring and reading labels, overall adequate and balanced calorie intake.  Discussed eating out and gave suggestions on controlling carbohydrate intake and making healthier food choices.     Meal Plan:     Total Carbohydrates per meal: 2-3 carb servings/meal, at least 3 meals/day  Lean protein with meals.  Limit added fats.  Snacks: 1 carbohydrate serving (</= 22 g) + 1 protein serving.     Daily exercise encouraged (as recommended by healthcare provider). Discussed the benefits of exercise in lowering blood glucose, blood pressure, cholesterol, stress and controlling body weight.     Discussed glucose monitoring to assist with understanding of factors affecting blood glucose and assist with management of diabetes.  Discussed and provided with target BG ranges.     Literature provided: Diabetes Nutrition Placemat, Choose Your Foods Booklet    Dietitian contact number provided.  Patient encouraged to call with questions or concerns.     Time spent with  patient: 40 minutes    Glory Charles RDN, LD  09/27/2023

## 2023-11-10 NOTE — TELEPHONE ENCOUNTER
I called the patient and let her know that showing in her chart on my end that she is up to date until 10/3/24. She understood and had no other questions or concerns at this time.

## 2023-11-10 NOTE — TELEPHONE ENCOUNTER
"  Caller: Sowmya Hodges \"Alecia\"    Relationship: Self    Best call back number: [phone]     What is the medical concern/diagnosis: MY CHART REMINDER TO GET MAMMO     What specialty or service is being requested: MAMMOGRAM    What is the provider, practice or medical service name:     What is the office location:     What is the office phone number:     Any additional details: PATIENT GOT REMINDER TO GET MAMMO         "

## 2023-11-13 ENCOUNTER — TELEPHONE (OUTPATIENT)
Dept: INTERNAL MEDICINE | Facility: CLINIC | Age: 73
End: 2023-11-13
Payer: MEDICARE

## 2023-11-13 NOTE — TELEPHONE ENCOUNTER
"     Caller: Sowmya Hodges \"Alecia\"    Relationship: Self    Best call back number: [phone]    Who is your current provider: MD BURRIS    Is your current provider offboarding? NO    Who would you like your new provider to be: CHELSEA GARCIA OR HASEEB ENGLISH     What are your reasons for transferring care: PATIENT GAVE NO REASONS SHE WOULD JUST LIKE A CHANGE       "

## 2023-11-14 NOTE — TELEPHONE ENCOUNTER
Spoke with pt and relayed information about controlled substances. she decided to stay with Dr Rubio for now.

## 2023-11-17 ENCOUNTER — TELEPHONE (OUTPATIENT)
Dept: INTERNAL MEDICINE | Facility: CLINIC | Age: 73
End: 2023-11-17
Payer: MEDICARE

## 2023-11-17 NOTE — TELEPHONE ENCOUNTER
"    Caller: Sowmya Hodges \"Alecia\"    Relationship: Self    Best call back number: [phone]     Requested Prescriptions:      [facility]     Pharmacy where request should be sent: [facility] [address] [place], [place] [address] [place] [facility] [address] [facility] [phone] [facility] [phone] FX     Last office visit with prescribing clinician: 6/19/2023   Last telemedicine visit with prescribing clinician: Visit date not found   Next office visit with prescribing clinician: 12/20/2023     Additional details provided by patient: REQUESTING A WRITTEN PRESCRIPTION     Does the patient have less than a 3 day supply:  [x] Yes  [] No    Would you like a call back once the refill request has been completed: [x] Yes [] No    If the office needs to give you a call back, can they leave a voicemail: [x] Yes [] No    Mounika Priest Rep   11/17/23 10:51 EST         "

## 2023-11-17 NOTE — TELEPHONE ENCOUNTER
I called the patient and she stated she never started the Mounjaro because she looked it up and it was for type 1 diabetes so she was not comfortable taking it. She would like to go back onto ozempic and it needs to be sent to [facility] pharmacy in [place].

## 2023-11-20 RX ORDER — BLOOD SUGAR DIAGNOSTIC
STRIP MISCELLANEOUS SEE ADMIN INSTRUCTIONS
Qty: 200 EACH | Refills: 12 | Status: SHIPPED | OUTPATIENT
Start: 2023-11-20

## 2023-11-20 RX ORDER — DAPAGLIFLOZIN 10 MG/1
TABLET, FILM COATED ORAL
Qty: 90 TABLET | Refills: 1 | Status: SHIPPED | OUTPATIENT
Start: 2023-11-20 | End: 2023-11-27

## 2023-11-21 ENCOUNTER — TELEPHONE (OUTPATIENT)
Dept: URGENT CARE | Facility: CLINIC | Age: 73
End: 2023-11-21
Payer: MEDICARE

## 2023-11-22 RX ORDER — SEMAGLUTIDE 1.34 MG/ML
1 INJECTION, SOLUTION SUBCUTANEOUS
Qty: 9 ML | Refills: 0 | Status: SHIPPED | OUTPATIENT
Start: 2023-11-22

## 2023-11-27 RX ORDER — PANTOPRAZOLE SODIUM 20 MG/1
TABLET, DELAYED RELEASE ORAL
Qty: 90 TABLET | Refills: 1 | OUTPATIENT
Start: 2023-11-27

## 2023-11-29 RX ORDER — TRAZODONE HYDROCHLORIDE 50 MG/1
TABLET ORAL
Qty: 30 TABLET | Refills: 2 | Status: SHIPPED | OUTPATIENT
Start: 2023-11-29

## 2023-11-29 RX ORDER — PANTOPRAZOLE SODIUM 40 MG/1
40 TABLET, DELAYED RELEASE ORAL DAILY
Qty: 90 TABLET | Refills: 1 | Status: SHIPPED | OUTPATIENT
Start: 2023-11-29

## 2023-11-29 RX ORDER — ATORVASTATIN CALCIUM 20 MG/1
20 TABLET, FILM COATED ORAL DAILY
Qty: 90 TABLET | Refills: 1 | Status: SHIPPED | OUTPATIENT
Start: 2023-11-29

## 2023-11-29 RX ORDER — PANTOPRAZOLE SODIUM 40 MG/1
40 TABLET, DELAYED RELEASE ORAL DAILY
Qty: 90 TABLET | Refills: 0 | OUTPATIENT
Start: 2023-11-29

## 2023-11-29 NOTE — TELEPHONE ENCOUNTER
Dose of pantoprazole was increased in July by gastroenterology. Refill of 40mg dose sent.    Refill of atorvastatin sent.

## 2023-11-29 NOTE — TELEPHONE ENCOUNTER
The original prescription was discontinued on 7/3/2023 by Chloe Euceda APRN for the following reason: Dose adjustment. Renewing this prescription may not be appropriate.         Please advise

## 2023-12-01 NOTE — TELEPHONE ENCOUNTER
Called pt, she states this refill request had been sent automatically from the pharmacy. Pt states she is taking Protonix 40mg

## 2023-12-04 ENCOUNTER — OFFICE VISIT (OUTPATIENT)
Dept: INTERNAL MEDICINE | Facility: CLINIC | Age: 73
End: 2023-12-04
Payer: MEDICARE

## 2023-12-04 VITALS
TEMPERATURE: 96.9 F | HEART RATE: 74 BPM | HEIGHT: 67 IN | SYSTOLIC BLOOD PRESSURE: 108 MMHG | BODY MASS INDEX: 34.53 KG/M2 | WEIGHT: 220 LBS | RESPIRATION RATE: 18 BRPM | OXYGEN SATURATION: 96 % | DIASTOLIC BLOOD PRESSURE: 60 MMHG

## 2023-12-04 DIAGNOSIS — H69.91 ETD (EUSTACHIAN TUBE DYSFUNCTION), RIGHT: ICD-10-CM

## 2023-12-04 DIAGNOSIS — Z76.0 MEDICATION REFILL: Primary | ICD-10-CM

## 2023-12-04 DIAGNOSIS — I10 PRIMARY HYPERTENSION: ICD-10-CM

## 2023-12-04 DIAGNOSIS — J01.00 ACUTE NON-RECURRENT MAXILLARY SINUSITIS: ICD-10-CM

## 2023-12-04 DIAGNOSIS — F41.1 GAD (GENERALIZED ANXIETY DISORDER): ICD-10-CM

## 2023-12-04 RX ORDER — CLONAZEPAM 1 MG/1
1 TABLET ORAL 2 TIMES DAILY PRN
Qty: 60 TABLET | Refills: 2 | Status: CANCELLED | OUTPATIENT
Start: 2023-12-04

## 2023-12-04 RX ORDER — LISINOPRIL 2.5 MG/1
2.5 TABLET ORAL DAILY
Qty: 90 TABLET | Refills: 1 | Status: SHIPPED | OUTPATIENT
Start: 2023-12-04

## 2023-12-04 RX ORDER — LEVOCETIRIZINE DIHYDROCHLORIDE 5 MG/1
5 TABLET, FILM COATED ORAL EVERY EVENING
Qty: 90 TABLET | Refills: 1 | Status: SHIPPED | OUTPATIENT
Start: 2023-12-04

## 2023-12-04 NOTE — TELEPHONE ENCOUNTER
Last follow up visit date: 12/4/23    Last urine drug screen date: 4/5/23    Last consent/contract date: 4/11/23    Does patient utilize [facility] pharmacy (yes or no)? [facility]

## 2023-12-04 NOTE — PROGRESS NOTES
"Chief Complaint  Acute recurrent maxillary sinusitis ( follow up 11/27/23) and Earache (Right sided- pain into teeth and jaw- almost done with antibiotics )    Subjective          Sowmya Hodges presents to [facility]  History of Present Illness  Patient was seen at  on 11/27. Dx with ETD and sinusitis  Currently taking cefdinir and previously on amoxicillin  Denies fever, chills, body aches  Mild cough, occasional, improving   Denies soa    She reports feeling anxious at times. She is requesting refill at this time    She is also requesting refills on other medications today  HTN-well controlled  Denies chest pain, dizziness    Objective   Vital Signs:   /60 (BP Location: Left arm, Patient Position: Sitting, Cuff Size: Large Adult)   Pulse 74   Temp 96.9 °F (36.1 °C)   Resp 18   Ht 170.2 cm (67\")   Wt 99.8 kg (220 lb)   SpO2 96%   BMI 34.46 kg/m²     Physical Exam  Vitals and nursing note reviewed.   Constitutional:       General: She is not in acute distress.     Appearance: Normal appearance.   HENT:      Head: Normocephalic and atraumatic.      Right Ear: Tympanic membrane, ear canal and external ear normal.      Left Ear: Tympanic membrane, ear canal and external ear normal.      Nose: Nose normal.      Mouth/Throat:      Mouth: Mucous membranes are moist.   Eyes:      Conjunctiva/sclera: Conjunctivae normal.   Cardiovascular:      Rate and Rhythm: Normal rate and regular rhythm.      Pulses: Normal pulses.      Heart sounds: Normal heart sounds. No murmur heard.     No friction rub. No gallop.   Pulmonary:      Effort: Pulmonary effort is normal. No respiratory distress.      Breath sounds: No wheezing, rhonchi or rales.   Musculoskeletal:      Cervical back: Neck supple.   Skin:     General: Skin is warm and dry.   Neurological:      General: No focal deficit present.      Mental Status: She is alert and oriented to person, place, and time. "   Psychiatric:         Mood and Affect: Mood normal.         Behavior: Behavior normal.        Result Review :          Procedures      Assessment and Plan    Diagnoses and all orders for this visit:    1. Medication refill (Primary)    2. Primary hypertension  Comments:  well controlled,    3. Acute non-recurrent maxillary sinusitis  Comments:  improving. she will finish abx as prescribed    4. KADE (generalized anxiety disorder)  Comments:  discussed medications at length. encouraged use of buspar with mild to moderate anxiety and reserved klonopin for severe anxiety.    5. ETD (Eustachian tube dysfunction), right  Comments:  improving, cont with current meds    Other orders  -     levocetirizine (XYZAL) 5 MG tablet; Take 1 tablet by mouth Every Evening.  Dispense: 90 tablet; Refill: 1  -     lisinopril (PRINIVIL,ZESTRIL) 2.5 MG tablet; Take 1 tablet by mouth Daily.  Dispense: 90 tablet; Refill: 1              Follow Up   Return in about 4 weeks (around 1/1/2024) for with Dr Rubio for DM, anxiety.  Patient was given instructions and counseling regarding her condition or for health maintenance advice. Please see specific information pulled into the AVS if appropriate.

## 2023-12-07 ENCOUNTER — TELEPHONE (OUTPATIENT)
Dept: OTOLARYNGOLOGY | Facility: CLINIC | Age: 73
End: 2023-12-07

## 2023-12-07 NOTE — TELEPHONE ENCOUNTER
"    Caller: Sowmya Hodges \"Alecia\"    Relationship to patient: Self    Best call back number: [phone]    Chief complaint: HAS SALAVARY GLAND STONES AND WAS NEEDING TO GET IN ASAP    Type of visit: FOLLOW UP    Requested date: ASAP    Additional notes: WOULD LIKE A CALL BACK, OK TO LEAVE VM  "

## 2023-12-11 RX ORDER — CLONAZEPAM 1 MG/1
1 TABLET ORAL 2 TIMES DAILY PRN
Qty: 60 TABLET | Refills: 2 | Status: SHIPPED | OUTPATIENT
Start: 2023-12-11

## 2023-12-13 ENCOUNTER — TELEPHONE (OUTPATIENT)
Dept: INTERNAL MEDICINE | Facility: CLINIC | Age: 73
End: 2023-12-13

## 2023-12-13 ENCOUNTER — OFFICE VISIT (OUTPATIENT)
Dept: INTERNAL MEDICINE | Facility: CLINIC | Age: 73
End: 2023-12-13
Payer: MEDICARE

## 2023-12-13 VITALS
RESPIRATION RATE: 18 BRPM | TEMPERATURE: 97.5 F | WEIGHT: 221.25 LBS | BODY MASS INDEX: 34.73 KG/M2 | SYSTOLIC BLOOD PRESSURE: 110 MMHG | HEART RATE: 69 BPM | OXYGEN SATURATION: 96 % | DIASTOLIC BLOOD PRESSURE: 64 MMHG | HEIGHT: 67 IN

## 2023-12-13 DIAGNOSIS — K13.70 ORAL LESION: Primary | ICD-10-CM

## 2023-12-13 PROCEDURE — 99213 OFFICE O/P EST LOW 20 MIN: CPT | Performed by: PHYSICIAN ASSISTANT

## 2023-12-13 PROCEDURE — 1159F MED LIST DOCD IN RCRD: CPT | Performed by: PHYSICIAN ASSISTANT

## 2023-12-13 PROCEDURE — 3074F SYST BP LT 130 MM HG: CPT | Performed by: PHYSICIAN ASSISTANT

## 2023-12-13 PROCEDURE — 1160F RVW MEDS BY RX/DR IN RCRD: CPT | Performed by: PHYSICIAN ASSISTANT

## 2023-12-13 PROCEDURE — 3051F HG A1C>EQUAL 7.0%<8.0%: CPT | Performed by: PHYSICIAN ASSISTANT

## 2023-12-13 PROCEDURE — 3078F DIAST BP <80 MM HG: CPT | Performed by: PHYSICIAN ASSISTANT

## 2023-12-13 NOTE — TELEPHONE ENCOUNTER
"Caller: Sowmya Hodges \"Alecia\"    Relationship: Self    Best call back number: [phone]    What are your current symptoms: DIAGNOSED WITH THRUSH 12/13/2023    Have you had these symptoms before:    [x] Yes  [] No    Have you been treated for these symptoms before:   [] Yes  [x] No    If a prescription is needed, what is your preferred pharmacy and phone number: [id number] [place] - [address] [facility] [phone] [facility] [phone]      Additional notes:  [facility]. PATIENT WOULD LIKE TO KNOW IF THERE IS ANOTHER MEDICATION OR SOMETHING SHE CAN TAKE TO HELP UNTIL THEY GET THE SHIPMENT TOMORROW.   "

## 2023-12-13 NOTE — TELEPHONE ENCOUNTER
"  Caller: Sowmya Hodges \"Alecia\"    Relationship: Self    Best call back number: [phone]    What was the call regarding: PATIENT WOULD LIKE FOR THE MEDICINE TO BE SENT TO [facility] [address] [place] [address], [place] - [address] [facility] [phone] [facility] [phone]  [phone] . SHE IS REQUESTING MOUTH WASH FORM IF AVAILABLE.     "

## 2023-12-13 NOTE — PROGRESS NOTES
Chief Complaint  Oral Pain (Has mouth pain under tongue, did have a white stone come out from under tongue. )    Subjective          Sowmya Hodges presents to [facility]  History of Present Illness  Pt states she had surgery on salivary glands by Dr. Nath years ago to remove stones  She noticed white spot under tongue last wk when she had URI . White spot is no longer visible  URI sx have resolved but pain under tongue persist  Denies sore throat, fever  No change in taste.  She has noticed slight white on tongue and irritation to tongue  She is utd on dental exams    Past Medical History:   Diagnosis Date    Anemia     Anxiety     Arthritis     Constipation     Depression     Diabetes     Diverticulitis     Epilepsy     Fracture of distal fibula 09/26/2017    right    GERD (gastroesophageal reflux disease)     Heart disease     HTN (hypertension)     Hyperlipidemia     Limb swelling     Lower abdominal pain     Nausea and vomiting in adult 06/03/2022    TAYLOR (obstructive sleep apnea)     Polyp, sinus maxillary     Renal cell adenocarcinoma, right 12/10/2014    Seasonal allergies     Shortness of breath         Past Surgical History:   Procedure Laterality Date    CARDIAC SURGERY      open heart at age 4     CARDIAC VALVE SURGERY      CHOLECYSTECTOMY      COLONOSCOPY  04/12/2017    dr monique    COLONOSCOPY N/A 1/17/2023    Procedure: COLONOSCOPY WITH BIOPSIES, POLYPECTOMY;  Surgeon: Sean Monique MD;  Location: [facility] ENDOSCOPY;  Service: Gastroenterology;  Laterality: N/A;  COLON POLYP    ENDOSCOPY  04/12/2017    dr monique    ENDOSCOPY N/A 6/9/2022    Procedure: ESOPHAGOGASTRODUODENOSCOPY with biopsy;  Surgeon: Sean Monique MD;  Location: [facility] ENDOSCOPY;  Service: Gastroenterology;  Laterality: N/A;  gastritis    EYE SURGERY      implant    NEPHRECTOMY PARTIAL Right 12/02/2014    robotic right partial nephrectomy w dr garvey    OTHER SURGICAL HISTORY       joint surgery    SALIVARY GLAND SURGERY      TONSILLECTOMY      TOTAL KNEE ARTHROPLASTY Right     UPPER GASTROINTESTINAL ENDOSCOPY  12/22/2020    Dr. Oneil    WRIST SURGERY Left         Current Outpatient Medications on File Prior to Visit   Medication Sig Dispense Refill    atorvastatin (LIPITOR) 20 MG tablet TAKE 1 TABLET BY MOUTH EVERY DAY 90 tablet 1    Blood Glucose Monitoring Suppl (Accu-Chek Sabrina Plus) w/Device kit 1 kit Take As Directed. To checke blood sugar up to 3 times a day. DX E11.9 1 kit 0    busPIRone (BUSPAR) 15 MG tablet Take 1 tablet by mouth 3 (Three) Times a Day As Needed.      clonazePAM (KlonoPIN) 1 MG tablet Take 1 tablet by mouth 2 (Two) Times a Day As Needed for Anxiety. 60 tablet 2    dapagliflozin Propanediol (Farxiga) 10 MG tablet Take 10 mg by mouth Daily.      diclofenac (VOLTAREN) 75 MG EC tablet Take 1 tablet by mouth 2 (Two) Times a Day.      Diclofenac Sodium (VOLTAREN) 1 % gel gel Apply 4 g topically to the appropriate area as directed 3 (Three) Times a Day As Needed.      dicyclomine (BENTYL) 20 MG tablet Take 1 po before meals and at bedtimes as needed for cramping 90 tablet 3    escitalopram (LEXAPRO) 20 MG tablet TAKE 1 TABLET BY MOUTH EVERY DAY 90 tablet 1    fluticasone (FLONASE) 50 MCG/ACT nasal spray USE 1-2 SPRAYS IN EACH NOSTRIL ONCE DAILY AS NEEDED 48 mL 1    glucose blood (Glucose Meter Test) test strip USE AS INSTRUCTED 200 each 12    glucose blood test strip Advanced Glucose Meter Test Strips   USE AS INSTRUCTED      Insulin Pen Needle (Pen Needles) 32G X 4 MM misc 1 each 1 (One) Time Per Week. 90 each 1    levETIRAcetam (KEPPRA) 750 MG tablet Take 1 tablet by mouth 2 (Two) Times a Day. 180 tablet 1    levocetirizine (XYZAL) 5 MG tablet Take 1 tablet by mouth Every Evening. 90 tablet 1    lidocaine (LIDODERM) 5 % Place 1 patch on the skin as directed by provider Daily.      lisinopril (PRINIVIL,ZESTRIL) 2.5 MG tablet Take 1 tablet by mouth Daily. 90 tablet 1  "   oxyCODONE-acetaminophen (PERCOCET) 7.5-325 MG per tablet Take 1 tablet every 8 hours by oral route as needed for 30 days.      Ozempic, 1 MG/DOSE, 4 MG/3ML solution pen-injector Inject 1 mg under the skin into the appropriate area as directed Every 7 (Seven) Days. 9 mL 0    pantoprazole (PROTONIX) 40 MG EC tablet Take 1 tablet by mouth Daily. 90 tablet 1    traZODone (DESYREL) 50 MG tablet TAKE 1 TABLET BY MOUTH EVERY DAY AT NIGHT 30 tablet 2    vitamin D3 125 MCG (5000 UT) capsule capsule Take 1 capsule by mouth Daily.       No current facility-administered medications on file prior to visit.        Allergies   Allergen Reactions    Codeine Rash              Social History     Tobacco Use   Smoking Status Former    Types: Cigarettes    Quit date:     Years since quittin.9   Smokeless Tobacco Never   Tobacco Comments    Less than 5 years          Objective   Vital Signs:   /64 (BP Location: Left arm, Patient Position: Sitting, Cuff Size: Adult)   Pulse 69   Temp 97.5 °F (36.4 °C) (Temporal)   Resp 18   Ht 170.2 cm (67\")   Wt 100 kg (221 lb 4 oz)   SpO2 96%   BMI 34.65 kg/m²     Physical Exam  Vitals reviewed.   Constitutional:       Appearance: Normal appearance.   HENT:      Head: Normocephalic and atraumatic.      Nose: Nose normal.      Mouth/Throat:      Mouth: Mucous membranes are moist.      Comments: Lesion noted under tongue. Please see attached photo  Eyes:      Extraocular Movements: Extraocular movements intact.      Conjunctiva/sclera: Conjunctivae normal.      Pupils: Pupils are equal, round, and reactive to light.   Cardiovascular:      Rate and Rhythm: Normal rate and regular rhythm.   Pulmonary:      Effort: Pulmonary effort is normal.      Breath sounds: Normal breath sounds.   Abdominal:      General: Abdomen is flat. Bowel sounds are normal.      Palpations: Abdomen is soft.   Musculoskeletal:         General: Normal range of motion.   Neurological:      General: No focal " deficit present.      Mental Status: She is alert and oriented to person, place, and time.   Psychiatric:         Mood and Affect: Mood normal.        Result Review :                            Assessment and Plan    Diagnoses and all orders for this visit:    1. Oral lesion (Primary)  Comments:  Referred to ENT, scheduled appt for 12/28 to be evaluated. will try Nystatin for symptomatic relief but discussed lesion may need to be removed/biopsy    Other orders  -     nystatin (MYCOSTATIN) 100,000 unit/mL suspension; Swish and swallow 5 mL 4 (Four) Times a Day.  Dispense: 473 mL; Refill: 0        Follow Up   Return if symptoms worsen or fail to improve, for Next scheduled follow up.  Patient was given instructions and counseling regarding her condition or for health maintenance advice. Please see specific information pulled into the AVS if appropriate.

## 2023-12-14 NOTE — TELEPHONE ENCOUNTER
[facility], medication was sent to [facility] pharmacy per pt request.

## 2023-12-14 NOTE — TELEPHONE ENCOUNTER
Called and spoke with pt, informed her to take medication until follow up appt per provider recommendations.

## 2023-12-14 NOTE — TELEPHONE ENCOUNTER
"  Caller: Sowmya Hodges \"Alecia\"    Relationship: Self    Best call back number: [phone]    What was the call regarding: PATIENT IS WANTING TO KNOW HOW MUCH TO TAKE.   "

## 2023-12-14 NOTE — TELEPHONE ENCOUNTER
Pt called into office, [facility] stated [facility] thought [facility] would, pt requested script be sent to [facility] pharmacy.

## 2023-12-16 DIAGNOSIS — M65.332 TRIGGER FINGER, LEFT MIDDLE FINGER: ICD-10-CM

## 2023-12-18 RX ORDER — DICLOFENAC SODIUM 75 MG/1
75 TABLET, DELAYED RELEASE ORAL 2 TIMES DAILY
Qty: 60 TABLET | Refills: 1 | Status: SHIPPED | OUTPATIENT
Start: 2023-12-18

## 2024-01-22 ENCOUNTER — TELEPHONE (OUTPATIENT)
Dept: GASTROENTEROLOGY | Facility: CLINIC | Age: 74
End: 2024-01-22
Payer: MEDICARE

## 2024-01-31 ENCOUNTER — TELEPHONE (OUTPATIENT)
Dept: GASTROENTEROLOGY | Facility: CLINIC | Age: 74
End: 2024-01-31
Payer: MEDICARE

## 2024-01-31 NOTE — TELEPHONE ENCOUNTER
"Hub staff attempted to follow warm transfer process and was unsuccessful     Caller: Sowmya Hodges \"Alecia\"    Relationship to patient: Self    Best call back number: [phone]    Patient is needing: PATIENT CALLED IN AND STATED THAT SHE HAD RECEIVED A MESSAGE FROM MANJU THAT SHE WAS UNSURE ABOUT AND WOULD LIKE A CALL BACK. PLEASE CALL BACK ANYTIME.        "

## 2024-02-02 NOTE — TELEPHONE ENCOUNTER
Attempted to contact pt to remind of overdue results. No answer but left message for pt to complete at any  lab

## 2024-02-17 RX ORDER — LEVETIRACETAM 750 MG/1
750 TABLET ORAL 2 TIMES DAILY
Qty: 180 TABLET | Refills: 1 | Status: SHIPPED | OUTPATIENT
Start: 2024-02-17

## 2024-02-17 NOTE — TELEPHONE ENCOUNTER
Keppra refilled.     She has Ozemopic on her med list. Please clarify whether she is taking this or Mounjaro. She should not be on both,

## 2024-02-19 NOTE — TELEPHONE ENCOUNTER
She is currently on ozempic and is needing a refill as well on it. I did let her know not to take the mounjaro if she had any just the ozempic. She also stated she needed refills on the other pended medications.

## 2024-02-21 RX ORDER — TRAZODONE HYDROCHLORIDE 50 MG/1
50 TABLET ORAL
Qty: 90 TABLET | Refills: 1 | Status: SHIPPED | OUTPATIENT
Start: 2024-02-21

## 2024-02-21 RX ORDER — SEMAGLUTIDE 1.34 MG/ML
1 INJECTION, SOLUTION SUBCUTANEOUS
Qty: 9 ML | Refills: 0 | Status: SHIPPED | OUTPATIENT
Start: 2024-02-21

## 2024-02-21 RX ORDER — ATORVASTATIN CALCIUM 20 MG/1
20 TABLET, FILM COATED ORAL DAILY
Qty: 90 TABLET | Refills: 1 | Status: SHIPPED | OUTPATIENT
Start: 2024-02-21

## 2024-02-21 RX ORDER — TIRZEPATIDE 2.5 MG/.5ML
INJECTION, SOLUTION SUBCUTANEOUS
OUTPATIENT
Start: 2024-02-21

## 2024-02-21 RX ORDER — PANTOPRAZOLE SODIUM 40 MG/1
40 TABLET, DELAYED RELEASE ORAL DAILY
Qty: 90 TABLET | Refills: 1 | Status: SHIPPED | OUTPATIENT
Start: 2024-02-21

## 2024-03-01 ENCOUNTER — LAB (OUTPATIENT)
Dept: LAB | Facility: HOSPITAL | Age: 74
End: 2024-03-01
Payer: MEDICARE

## 2024-03-01 DIAGNOSIS — K76.0 FATTY LIVER: ICD-10-CM

## 2024-03-01 DIAGNOSIS — R79.89 ELEVATED LFTS: ICD-10-CM

## 2024-03-01 LAB
ALBUMIN SERPL-MCNC: 4.1 G/DL (ref 3.5–5.2)
ALP SERPL-CCNC: 109 U/L (ref 39–117)
ALT SERPL W P-5'-P-CCNC: 41 U/L (ref 1–33)
AST SERPL-CCNC: 36 U/L (ref 1–32)
BILIRUB CONJ SERPL-MCNC: <0.2 MG/DL (ref 0–0.3)
BILIRUB INDIRECT SERPL-MCNC: ABNORMAL MG/DL
BILIRUB SERPL-MCNC: 0.4 MG/DL (ref 0–1.2)
DEPRECATED RDW RBC AUTO: 44.2 FL (ref 37–54)
ERYTHROCYTE [DISTWIDTH] IN BLOOD BY AUTOMATED COUNT: 12.9 % (ref 12.3–15.4)
HCT VFR BLD AUTO: 37.6 % (ref 34–46.6)
HGB BLD-MCNC: 12.5 G/DL (ref 12–15.9)
INR PPP: 0.99 (ref 0.86–1.15)
MCH RBC QN AUTO: 31.3 PG (ref 26.6–33)
MCHC RBC AUTO-ENTMCNC: 33.2 G/DL (ref 31.5–35.7)
MCV RBC AUTO: 94 FL (ref 79–97)
PLATELET # BLD AUTO: 236 10*3/MM3 (ref 140–450)
PMV BLD AUTO: 9.2 FL (ref 6–12)
PROT SERPL-MCNC: 6.8 G/DL (ref 6–8.5)
PROTHROMBIN TIME: 13.3 SECONDS (ref 11.8–14.9)
RBC # BLD AUTO: 4 10*6/MM3 (ref 3.77–5.28)
WBC NRBC COR # BLD AUTO: 6.16 10*3/MM3 (ref 3.4–10.8)

## 2024-03-01 PROCEDURE — 85610 PROTHROMBIN TIME: CPT

## 2024-03-01 PROCEDURE — 36415 COLL VENOUS BLD VENIPUNCTURE: CPT

## 2024-03-01 PROCEDURE — 80076 HEPATIC FUNCTION PANEL: CPT

## 2024-03-01 PROCEDURE — 85027 COMPLETE CBC AUTOMATED: CPT

## 2024-03-06 ENCOUNTER — OFFICE VISIT (OUTPATIENT)
Dept: INTERNAL MEDICINE | Facility: CLINIC | Age: 74
End: 2024-03-06
Payer: MEDICARE

## 2024-03-06 VITALS
BODY MASS INDEX: 35.16 KG/M2 | TEMPERATURE: 97.6 F | RESPIRATION RATE: 16 BRPM | OXYGEN SATURATION: 96 % | SYSTOLIC BLOOD PRESSURE: 102 MMHG | HEIGHT: 67 IN | DIASTOLIC BLOOD PRESSURE: 58 MMHG | WEIGHT: 224 LBS | HEART RATE: 79 BPM

## 2024-03-06 DIAGNOSIS — Z51.81 ENCOUNTER FOR THERAPEUTIC DRUG LEVEL MONITORING: ICD-10-CM

## 2024-03-06 DIAGNOSIS — Z78.0 POSTMENOPAUSAL: ICD-10-CM

## 2024-03-06 DIAGNOSIS — I10 PRIMARY HYPERTENSION: ICD-10-CM

## 2024-03-06 DIAGNOSIS — F41.9 ANXIETY: ICD-10-CM

## 2024-03-06 DIAGNOSIS — G40.909 NONINTRACTABLE EPILEPSY WITHOUT STATUS EPILEPTICUS, UNSPECIFIED EPILEPSY TYPE: ICD-10-CM

## 2024-03-06 DIAGNOSIS — E78.5 HYPERLIPIDEMIA, UNSPECIFIED HYPERLIPIDEMIA TYPE: ICD-10-CM

## 2024-03-06 DIAGNOSIS — E11.65 TYPE 2 DIABETES MELLITUS WITH HYPERGLYCEMIA, WITHOUT LONG-TERM CURRENT USE OF INSULIN: ICD-10-CM

## 2024-03-06 DIAGNOSIS — Z00.00 MEDICARE ANNUAL WELLNESS VISIT, SUBSEQUENT: Primary | ICD-10-CM

## 2024-03-06 DIAGNOSIS — F32.A DEPRESSION, UNSPECIFIED DEPRESSION TYPE: ICD-10-CM

## 2024-03-06 PROBLEM — R05.9 COUGH: Status: RESOLVED | Noted: 2022-01-10 | Resolved: 2024-03-06

## 2024-03-06 RX ORDER — CEFDINIR 300 MG/1
300 CAPSULE ORAL 2 TIMES DAILY
COMMUNITY
End: 2024-03-06

## 2024-03-06 RX ORDER — BACLOFEN 10 MG/1
1 TABLET ORAL 3 TIMES DAILY
COMMUNITY
Start: 2024-02-20

## 2024-03-06 RX ORDER — LISINOPRIL 2.5 MG/1
2.5 TABLET ORAL DAILY
Qty: 90 TABLET | Refills: 1 | Status: SHIPPED | OUTPATIENT
Start: 2024-03-06

## 2024-03-06 RX ORDER — ESCITALOPRAM OXALATE 20 MG/1
20 TABLET ORAL DAILY
Qty: 90 TABLET | Refills: 1 | Status: SHIPPED | OUTPATIENT
Start: 2024-03-06

## 2024-03-06 RX ORDER — DOXYCYCLINE HYCLATE 100 MG/1
1 CAPSULE ORAL 2 TIMES DAILY
COMMUNITY
End: 2024-03-06
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
## 2024-03-07 ENCOUNTER — OFFICE VISIT (OUTPATIENT)
Dept: GASTROENTEROLOGY | Facility: CLINIC | Age: 74
End: 2024-03-07
Payer: MEDICARE

## 2024-03-07 VITALS
WEIGHT: 225.6 LBS | SYSTOLIC BLOOD PRESSURE: 107 MMHG | BODY MASS INDEX: 35.41 KG/M2 | HEIGHT: 67 IN | DIASTOLIC BLOOD PRESSURE: 50 MMHG | HEART RATE: 64 BPM

## 2024-03-07 DIAGNOSIS — K58.9 IRRITABLE BOWEL SYNDROME, UNSPECIFIED TYPE: ICD-10-CM

## 2024-03-07 DIAGNOSIS — K76.0 FATTY LIVER: Primary | ICD-10-CM

## 2024-03-07 DIAGNOSIS — K74.69 OTHER CIRRHOSIS OF LIVER: ICD-10-CM

## 2024-03-07 DIAGNOSIS — E66.01 CLASS 2 SEVERE OBESITY WITH SERIOUS COMORBIDITY AND BODY MASS INDEX (BMI) OF 35.0 TO 35.9 IN ADULT, UNSPECIFIED OBESITY TYPE: ICD-10-CM

## 2024-03-07 PROBLEM — R10.30 LOWER ABDOMINAL PAIN: Status: RESOLVED | Noted: 2023-09-07 | Resolved: 2024-03-07

## 2024-03-07 PROBLEM — Z00.00 MEDICARE ANNUAL WELLNESS VISIT, SUBSEQUENT: Status: RESOLVED | Noted: 2022-01-10 | Resolved: 2024-03-07

## 2024-03-07 PROBLEM — R11.2 NAUSEA AND VOMITING IN ADULT: Status: RESOLVED | Noted: 2022-06-03 | Resolved: 2024-03-07

## 2024-03-07 PROBLEM — Z79.899 MEDICATION MANAGEMENT: Status: RESOLVED | Noted: 2022-01-10 | Resolved: 2024-03-07

## 2024-03-07 PROBLEM — M79.89 LIMB SWELLING: Status: RESOLVED | Noted: 2023-09-07 | Resolved: 2024-03-07

## 2024-03-07 PROBLEM — R06.02 SHORTNESS OF BREATH: Status: RESOLVED | Noted: 2023-09-07 | Resolved: 2024-03-07

## 2024-03-07 PROBLEM — S82.899A CLOSED FRACTURE OF ANKLE: Status: RESOLVED | Noted: 2017-09-26 | Resolved: 2024-03-07

## 2024-03-07 PROBLEM — Z00.00 MEDICARE ANNUAL WELLNESS VISIT, SUBSEQUENT: Status: ACTIVE | Noted: 2022-01-10

## 2024-03-07 NOTE — ASSESSMENT & PLAN NOTE
Hypertension is stable and controlled  Continue current treatment regimen.  Blood pressure will be reassessed  at next follow up  .
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
## 2024-03-07 NOTE — PROGRESS NOTES
Patient Name: Sowmya Hodges   Visit Date: 03/07/2024   Patient ID: [id number]  Provider: LORENZO Paz    Sex: female  Location:  Location Address:  Location Phone: [phone]  [place] [address] [phone]    YOB: 1950  Age: 73 y.o.   Primary Care Provider Tamara Singer PA-C      Referring Provider: No ref. provider found        Chief Complaint  Heartburn    History of Present Illness  Patient initially presented in 2017 with abdominal pain and heartburn.  GES December 2017 within normal limits.  PMH significant for renal cell carcinoma 2014 status post partial right nephrectomy, follows w DR Butts.  History of fatty liver seen on CT scan, hepatic work-up has been completed 4/2020 and negative.  Patient did report a cousin with cirrhosis and was told he had a genetic issue      History of intermittent diarrhea-Xifaxan improved symptoms February 2022      EGD for persistent nausea vomiting 6/9/2022-esophagus normal, gastritis noted-biopsy with mild chronic inactive gastritis, normal duodenum, Carafate was given  Colonoscopy 1/17/2023 for lower abd pain/cramping : good bowel prep, small polyp in the ascending colon-adenomatous, repeat 5 yrs, random colon bx negative     CT abdomen and pelvis with and without contrast 5/17/2023: Enlarged liver with nodular contour questionable cirrhosis, mild splenomegaly, large stool burden, no suspicious renal masses     9/1/2023 Fibroscan S3, F3    Patient was last seen 9/7/23 and reported diarrhea and cramping were better as well as abdominal pain, Bentyl helps and she takes. Hep A &B Vaccines ordered. R/w pt well compensated, possible cirrhosis. Lifestyle modifications discussed.     US liver 11/2/23 coarse echotexture compatible with hepatocellular disease and/or steatosis, no mass     Pt has lost 20# since last visit - walking, eating less carbs and portion sizes. Pt states   Pt is drinking black coffee  Pt states she takes Bentyl PRN,  she is doing well currently with GI sx, no c/o abd pain/ diarrhea.   Past Medical History:   Diagnosis Date    Anemia     Anxiety     Arthritis     Constipation     Depression     Diabetes     Diverticulitis     Epilepsy     Fracture of distal fibula 09/26/2017    right    GERD (gastroesophageal reflux disease)     Heart disease     HTN (hypertension)     Hyperlipidemia     Limb swelling     Lower abdominal pain     Nausea and vomiting in adult 06/03/2022    TAYLOR (obstructive sleep apnea)     Polyp, sinus maxillary     Renal cell adenocarcinoma, right 12/10/2014    Seasonal allergies     Shortness of breath        Past Surgical History:   Procedure Laterality Date    CARDIAC SURGERY      open heart at age 4     CARDIAC VALVE SURGERY      CHOLECYSTECTOMY      COLONOSCOPY  04/12/2017    dr monique    COLONOSCOPY N/A 1/17/2023    Procedure: COLONOSCOPY WITH BIOPSIES, POLYPECTOMY;  Surgeon: Sean Monique MD;  Location: [facility] ENDOSCOPY;  Service: Gastroenterology;  Laterality: N/A;  COLON POLYP    ENDOSCOPY  04/12/2017    dr monique    ENDOSCOPY N/A 6/9/2022    Procedure: ESOPHAGOGASTRODUODENOSCOPY with biopsy;  Surgeon: Sean Monique MD;  Location: [facility] ENDOSCOPY;  Service: Gastroenterology;  Laterality: N/A;  gastritis    EYE SURGERY      implant    NEPHRECTOMY PARTIAL Right 12/02/2014    robotic right partial nephrectomy w dr garvey    OTHER SURGICAL HISTORY      joint surgery    SALIVARY GLAND SURGERY      TONSILLECTOMY      TOTAL KNEE ARTHROPLASTY Right     UPPER GASTROINTESTINAL ENDOSCOPY  12/22/2020    Dr. Monique    WRIST SURGERY Left        Allergies   Allergen Reactions    Codeine Rash              Family History   Problem Relation Age of Onset    Arthritis Mother     Arthritis Father     Cancer Father     Arthritis Sister     Arthritis Brother     Colon cancer Neg Hx     Malig Hyperthermia Neg Hx         Social History     Tobacco Use    Smoking status: Former     Current packs/day: 0.00      "Average packs/day: 1 pack/day for 5.0 years (5.0 ttl pk-yrs)     Types: Cigarettes     Start date:      Quit date:      Years since quittin.1    Smokeless tobacco: Never    Tobacco comments:     Less than 5 years   Vaping Use    Vaping status: Never Used   Substance Use Topics    Alcohol use: Never     Comment: drinks less than 1 drink per day     Drug use: Never       Objective     Vital Signs:   /50 (BP Location: Left arm, Patient Position: Sitting, Cuff Size: Small Adult)   Pulse 64   Ht 170.2 cm (67\")   Wt 102 kg (225 lb 9.6 oz)   BMI 35.33 kg/m²       Physical Exam  Constitutional:       General: The patient is not in acute distress.     Appearance: Normal appearance.   HENT:      Head: Normocephalic and atraumatic.      Nose: Nose normal.   Pulmonary:      Effort: Pulmonary effort is normal. No respiratory distress.   Abdominal:      General: Abdomen is flat.      Palpations: Abdomen is soft. There is no mass.      Tenderness: There is no abdominal tenderness. There is no guarding.   Musculoskeletal:      Cervical back: Neck supple.      Right lower leg: No edema.      Left lower leg: No edema.   Skin:     General: Skin is warm and dry.   Neurological:      General: No focal deficit present.      Mental Status: The patient is alert and oriented to person, place, and time.      Gait: Gait normal.   Psychiatric:         Mood and Affect: Mood normal.         Speech: Speech normal.         Behavior: Behavior normal.         Thought Content: Thought content normal.     Result Review :   The following data was reviewed by: LORENZO Paz on 2024:    CBC w/diff          2023    10:02 3/1/2024    06:57   CBC w/Diff   WBC 3.71  6.16    RBC 4.25  4.00    Hemoglobin 12.7  12.5    Hematocrit 37.9  37.6    MCV 89.2  94.0    MCH 29.9  31.3    MCHC 33.5  33.2    RDW 13.4  12.9    Platelets 244  236    Neutrophil Rel % 56.2     Immature Granulocyte Rel % 0.5     Lymphocyte Rel " % 35.0     Monocyte Rel % 6.5     Eosinophil Rel % 1.3     Basophil Rel % 0.5       CMP          6/19/2023    10:02 3/1/2024    06:57   CMP   Glucose 123     BUN 10     Creatinine 0.78     EGFR 81.8     Sodium 144     Potassium 4.0     Chloride 109     Calcium 9.4     Total Protein 7.2  6.8    Albumin 4.2  4.1    Globulin 3.0     Total Bilirubin 0.5  0.4    Alkaline Phosphatase 86  109    AST (SGOT) 37  36    ALT (SGPT) 39  41    Albumin/Globulin Ratio 1.4     BUN/Creatinine Ratio 12.8     Anion Gap 10.0         Liver Workup   A-1 Antitrypsin   Date Value Ref Range Status   04/23/2020 145 101 - 187 mg/dL Final     ds DNA Antibody   Date Value Ref Range Status   04/23/2020 NEGATIVE [IU]/mL Final     Ceruloplasmin   Date Value Ref Range Status   04/23/2020 28.2 19.0 - 39.0 mg/dL Final     Ferritin   Date Value Ref Range Status   04/23/2020 201 (H) 10 - 200 ng/mL Final     Comment:     <10 ng/ml usually associated with Iron Deficiency Anemia.  Above normal range levels may be due to Hepatic and/or  Chronic Inflammatory Disease.       Immunofixation Result, Serum   Date Value Ref Range Status   04/23/2020 COMMENT NA Final     Comment:     No monoclonality detected.     IgA   Date Value Ref Range Status   04/23/2020 164 87 - 352 mg/dL Final     Iron   Date Value Ref Range Status   04/23/2020 58 (L) 60 - 170 ug/dL Final     TIBC   Date Value Ref Range Status   04/23/2020 399 245 - 450 ug/dL Final     Comment:     As of 10/15/03, the chemistry department began utilizing a Transferrin  assay. Data suggests that Transferrin is a better estimate of Total Iron  binding capacity than the TIBC assay. As a result the TIBC will now be a  calculated estimate from the measured Transferrin.       Iron Saturation   Date Value Ref Range Status   04/23/2020 15 (L) 20 - 55 % Final     Transferrin   Date Value Ref Range Status   04/23/2020 279.00 250.00 - 380.00 mg/dL Final     Mitochondrial Ab   Date Value Ref Range Status   04/23/2020  <20.0 0.0 - 20.0 Units Final     Comment:                                                    Negative    0.0 - 20.0                                                 Equivocal  20.1 - 24.9                                                 Positive         >24.9                 Mitochondrial (M2) Antibodies are found in 90-96% of                 patients with primary biliary cirrhosis.       Protime   Date Value Ref Range Status   03/01/2024 13.3 11.8 - 14.9 Seconds Final     INR   Date Value Ref Range Status   03/01/2024 0.99 0.86 - 1.15 Final     AFP   Date Value Ref Range Status   04/23/2020 1.1 0.0 - 8.7 ng/mL Final     Comment:     Test Information: AFP (Tumor Marker)      The Roche e601 by ECL[employer] method was used to perform this  test. Results obtained with different assay methods cannot be  used interchangeably.  This result is not absolute evidence of the presence or absence  of Malignant disease. AFP results should be considered with  clinical evaluation and other diagnostic procedures.  ---------------------------------------------------------------  AFP Values in Benign and Malignant Disease  Sample Category  N   0-8.7  8.8-15.0 15.1-20.0 20.1-100  >100  Appar Healthy   140   136     4          0         0       0  males            70   68      2          0         0       0  females          70   68      2          0         0       0  Malig.Diseases  165   112     6          4        15      28  Test.Cancer  -seminoma        9     6      1          0         2       0  -non-seminoma    62   22      3          4        11      22  Liver Cancer     12    8      0          0         1       3  Other Cancer  -colorectal.     13   12      1          0         0       0  -genitourinary   36   35      1          0         0       0  -ovarian          8    6      0          0         0       2  -pancreatic       8    8      0          0         0       0  -breast           5    5      0          0         0        0  -stomach          2    1      0          0         1       0  -other           10    9      0          0         0       1  Benign Diseases  76   74      1          0         1       0  -cirrhosis       16   16      0          0         0       0  -hepatitis       17   15      1          0         1       0  -pancreatitis     5    5      0          0         0       0  -gi and pid      10   10      0          0         0       0  -BPH             21   21      0          0         0       0  -urogenital       7    7      0          0         0       0  In this study,95% of the apparently healthy subjects had  AFP values < or =8.30 ng/ml.  -------------------------------------------------------------       Acute HEP Panel   Hep A IgM   Date Value Ref Range Status   04/23/2020 Negative Negative NA Final     Hep B Core IgM   Date Value Ref Range Status   04/23/2020 Negative Negative NA Final               Assessment and Plan    Diagnoses and all orders for this visit:    1. Fatty liver (Primary)  -     CBC (No Diff); Future  -     Comprehensive Metabolic Panel; Future  -     Protime-INR; Future  -     AFP Tumor Marker; Future  -     US Liver; Future    2. Other cirrhosis of liver  -     AFP Tumor Marker; Future    3. Class 2 severe obesity with serious comorbidity and body mass index (BMI) of 35.0 to 35.9 in adult, unspecified obesity type    4. Irritable bowel syndrome, unspecified type  Comments:  currently stable              Follow Up   Return in about 6 months (around 9/7/2024).  US in May  Labs in Sept  Cont low carb diet and weight loss, 2-4 c. Coffee/d  Congratulated pt on weight loss, very close to meeting 10% wt loss goal  Cont Maicolyl PRN    Patient was given instructions and counseling regarding her condition or for health maintenance advice. Please see specific information pulled into the AVS if appropriate.

## 2024-03-13 DIAGNOSIS — M65.332 TRIGGER FINGER, LEFT MIDDLE FINGER: ICD-10-CM

## 2024-03-14 RX ORDER — DICLOFENAC SODIUM 75 MG/1
75 TABLET, DELAYED RELEASE ORAL 2 TIMES DAILY
Qty: 60 TABLET | Refills: 1 | Status: SHIPPED | OUTPATIENT
Start: 2024-03-14

## 2024-03-14 RX ORDER — SEMAGLUTIDE 1.34 MG/ML
1 INJECTION, SOLUTION SUBCUTANEOUS
Qty: 3 ML | Refills: 1 | Status: SHIPPED | OUTPATIENT
Start: 2024-03-14

## 2024-03-14 NOTE — TELEPHONE ENCOUNTER
Pt was supposed to return to clinic for fasting labs. We need to see her A1c. Please have her return to clinic asap to have drawn and we can send refill.

## 2024-03-22 ENCOUNTER — TELEPHONE (OUTPATIENT)
Dept: OTOLARYNGOLOGY | Facility: CLINIC | Age: 74
End: 2024-03-22
Payer: MEDICARE

## 2024-03-22 NOTE — TELEPHONE ENCOUNTER
Left message for patient to call our office.    Patient left message about having salivary gland stones and was needing to get in ASAP. Patient has appointment for 5/6/24. We were calling to see if she would like to come in today.

## 2024-04-03 ENCOUNTER — APPOINTMENT (OUTPATIENT)
Dept: GENERAL RADIOLOGY | Facility: HOSPITAL | Age: 74
End: 2024-04-03
Payer: MEDICARE

## 2024-04-03 ENCOUNTER — HOSPITAL ENCOUNTER (EMERGENCY)
Facility: HOSPITAL | Age: 74
Discharge: HOME | End: 2024-04-03
Attending: EMERGENCY MEDICINE | Admitting: EMERGENCY MEDICINE
Payer: MEDICARE

## 2024-04-03 VITALS
RESPIRATION RATE: 16 BRPM | OXYGEN SATURATION: 100 % | BODY MASS INDEX: 34.78 KG/M2 | WEIGHT: 221.56 LBS | HEIGHT: 67 IN | HEART RATE: 66 BPM | SYSTOLIC BLOOD PRESSURE: 139 MMHG | TEMPERATURE: 98.6 F | DIASTOLIC BLOOD PRESSURE: 63 MMHG

## 2024-04-03 DIAGNOSIS — U07.1 COVID-19: ICD-10-CM

## 2024-04-03 DIAGNOSIS — R06.00 DYSPNEA, UNSPECIFIED TYPE: Primary | ICD-10-CM

## 2024-04-03 DIAGNOSIS — F41.9 ANXIETY: ICD-10-CM

## 2024-04-03 PROCEDURE — 99283 EMERGENCY DEPT VISIT LOW MDM: CPT

## 2024-04-03 PROCEDURE — 71045 X-RAY EXAM CHEST 1 VIEW: CPT

## 2024-04-03 NOTE — ED PROVIDER NOTES
Time: 3:57 AM EDT  Date of encounter:  4/3/2024  Independent Historian/Clinical History and Information was obtained by:   Patient    History is limited by: N/A    Chief Complaint: ANXIOUS/NERVOUS/SOA      History of Present Illness:      The patient presents to the emergency department tonight states that she was diagnosed with COVID on Friday.  She states that she was asleep tonight when she woke up and had the sensation that she could not breathe.  She states she has a history of anxiety and has done this previously even without having COVID.  She states that before the ambulance got there she had already started to calm down and was not sure that she wanted to come to the hospital.  She states that she takes Klonopin at home and does sometimes have to wake up and take it in the middle of the night due to her anxiety.  She states that she asked the EMS if she should take it or not and they told her that she should probably just be checked out at the hospital.  She states that she has not had any increased shortness of breath.  She is resting quietly in the room and able to speak in full sentences.  She is wearing a facemask without any difficulties.  She says she has had no increase in swelling to her feet or ankles lately she denies any leg or calf pain.  She reports no hemoptysis or chest pain.  She states she does have some mid back pain that is worse when she takes a deep breath but does also have chronic back pain as well.      History provided by:  Patient   used: No        Patient Care Team  Primary Care Provider: Tamara Singer PA-C    Past Medical History:     Allergies   Allergen Reactions    Codeine Rash            Past Medical History:   Diagnosis Date    Anemia     Anxiety     Arthritis     Constipation     Depression     Diabetes     Diverticulitis     Epilepsy     Fracture of distal fibula 09/26/2017    right    GERD (gastroesophageal reflux disease)     Heart disease     HTN  (hypertension)     Hyperlipidemia     Limb swelling     Lower abdominal pain     Nausea and vomiting in adult 06/03/2022    TAYLOR (obstructive sleep apnea)     Polyp, sinus maxillary     Renal cell adenocarcinoma, right 12/10/2014    Seasonal allergies     Shortness of breath      Past Surgical History:   Procedure Laterality Date    CARDIAC SURGERY      open heart at age 4     CARDIAC VALVE SURGERY      CHOLECYSTECTOMY      COLONOSCOPY  04/12/2017    dr monique    COLONOSCOPY N/A 1/17/2023    Procedure: COLONOSCOPY WITH BIOPSIES, POLYPECTOMY;  Surgeon: Sean Monique MD;  Location: [facility] ENDOSCOPY;  Service: Gastroenterology;  Laterality: N/A;  COLON POLYP    ENDOSCOPY  04/12/2017    dr monique    ENDOSCOPY N/A 6/9/2022    Procedure: ESOPHAGOGASTRODUODENOSCOPY with biopsy;  Surgeon: Sean Monique MD;  Location: [facility] ENDOSCOPY;  Service: Gastroenterology;  Laterality: N/A;  gastritis    EYE SURGERY      implant    NEPHRECTOMY PARTIAL Right 12/02/2014    robotic right partial nephrectomy w dr garvey    OTHER SURGICAL HISTORY      joint surgery    SALIVARY GLAND SURGERY      TONSILLECTOMY      TOTAL KNEE ARTHROPLASTY Right     UPPER GASTROINTESTINAL ENDOSCOPY  12/22/2020    Dr. Monique    WRIST SURGERY Left      Family History   Problem Relation Age of Onset    Arthritis Mother     Arthritis Father     Cancer Father     Arthritis Sister     Arthritis Brother     Colon cancer Neg Hx     Malig Hyperthermia Neg Hx        Home Medications:  Prior to Admission medications    Medication Sig Start Date End Date Taking? Authorizing Provider   atorvastatin (LIPITOR) 20 MG tablet Take 1 tablet by mouth Daily. 2/21/24   Romi Rubio MD   baclofen (LIORESAL) 10 MG tablet Take 1 tablet by mouth 3 times a day. 2/20/24   Adeola Burch MD   baclofen (LIORESAL) 10 MG tablet Take 1 tablet by mouth 3 (Three) Times a Day.    Adeola Burch MD   benzonatate (TESSALON) 100 MG capsule Take 1 capsule  by mouth 3 (Three) Times a Day As Needed for Cough. 3/29/24   Jerrod Kiran MD   Blood Glucose Monitoring Suppl (Accu-Chek Sabrina Plus) w/Device kit 1 kit Take As Directed. To checke blood sugar up to 3 times a day. DX E11.9 7/8/21   Romi Rubio MD   busPIRone (BUSPAR) 15 MG tablet Take 1 tablet by mouth 3 (Three) Times a Day As Needed.    Adeola Burch MD   clonazePAM (KlonoPIN) 1 MG tablet Take 1 tablet by mouth 2 (Two) Times a Day As Needed for Anxiety. 12/11/23   Rosa Jacobs MD   dapagliflozin Propanediol (Farxiga) 10 MG tablet Take 10 mg by mouth Daily.    Adeola Burch MD   diclofenac (VOLTAREN) 75 MG EC tablet TAKE 1 TABLET BY MOUTH TWICE A DAY 3/14/24   Mustapha Alfaro MD   Diclofenac Sodium (VOLTAREN) 1 % gel gel Apply 4 g topically to the appropriate area as directed 3 (Three) Times a Day As Needed. 2/19/22   Adeola Burch MD   dicyclomine (BENTYL) 20 MG tablet Take 1 po before meals and at bedtimes as needed for cramping 7/3/23   Chloe Euceda APRN   escitalopram (LEXAPRO) 20 MG tablet Take 1 tablet by mouth Daily. 3/6/24   Tamara Singer PA-C   fluticasone (FLONASE) 50 MCG/ACT nasal spray USE 1-2 SPRAYS IN EACH NOSTRIL ONCE DAILY AS NEEDED 11/2/22   Romi Rubio MD   Insulin Pen Needle (Pen Needles) 32G X 4 MM misc 1 each 1 (One) Time Per Week. 10/19/22   Romi Rubio MD   levETIRAcetam (KEPPRA) 750 MG tablet TAKE 1 TABLET BY MOUTH TWICE A DAY 2/17/24   Romi Rubio MD   levocetirizine (XYZAL) 5 MG tablet Take 1 tablet by mouth Every Evening. 12/4/23   Janee Arshad APRN   lidocaine (LIDODERM) 5 % Place 1 patch on the skin as directed by provider Daily.    Adeola Burch MD   lisinopril (PRINIVIL,ZESTRIL) 2.5 MG tablet Take 1 tablet by mouth Daily. 3/6/24   Tamara Singer PA-C   Nirmatrelvir & Ritonavir, 300mg/100mg, (PAXLOVID) Take 3 tablets by mouth 2 (Two) Times a Day for 5 days.  3/29/24 4/3/24  Jerrod Kiran MD   oxyCODONE-acetaminophen (PERCOCET) 7.5-325 MG per tablet Take 1 tablet every 8 hours by oral route as needed for 30 days. 23   ProviderAdeola MD   Ozempic, 1 MG/DOSE, 4 MG/3ML solution pen-injector INJECT 1 MG UNDER THE SKIN INTO THE APPROPRIATE AREA AS DIRECTED EVERY 7 (SEVEN) DAYS. 3/14/24   Romi Rubio MD   pantoprazole (PROTONIX) 40 MG EC tablet Take 1 tablet by mouth Daily. 24   Romi Rubio MD   traZODone (DESYREL) 50 MG tablet Take 1 tablet by mouth every night at bedtime. 24   Romi Rubio MD   vitamin D3 125 MCG (5000 UT) capsule capsule Take 1 capsule by mouth Daily.    Provider, MD Adeola        Social History:   Social History     Tobacco Use    Smoking status: Former     Current packs/day: 0.00     Average packs/day: 1 pack/day for 5.0 years (5.0 ttl pk-yrs)     Types: Cigarettes     Start date:      Quit date: 2000     Years since quittin.2    Smokeless tobacco: Never    Tobacco comments:     Less than 5 years   Vaping Use    Vaping status: Never Used   Substance Use Topics    Alcohol use: Never     Comment: drinks less than 1 drink per day     Drug use: Never         Review of Systems:  Review of Systems   Constitutional:  Negative for chills and fever.   HENT:  Negative for congestion, ear pain and sore throat.    Eyes:  Negative for pain.   Respiratory:  Positive for cough. Negative for chest tightness, shortness of breath and wheezing.    Cardiovascular:  Negative for chest pain and leg swelling.   Gastrointestinal:  Negative for abdominal pain, diarrhea, nausea and vomiting.   Genitourinary:  Negative for dysuria, flank pain, hematuria and urgency.   Musculoskeletal:  Positive for back pain. Negative for joint swelling, neck pain and neck stiffness.   Skin:  Negative for pallor and rash.   Neurological:  Negative for seizures and headaches.   Psychiatric/Behavioral:  The  "patient is nervous/anxious.    All other systems reviewed and are negative.       Physical Exam:  /63 (BP Location: Left arm, Patient Position: Sitting)   Pulse 66   Temp 98.6 °F (37 °C) (Oral)   Resp 16   Ht 170.2 cm (67\")   Wt 101 kg (221 lb 9 oz)   SpO2 100%   BMI 34.70 kg/m²     Physical Exam  Vitals and nursing note reviewed.   Constitutional:       General: She is not in acute distress.     Appearance: Normal appearance. She is not ill-appearing or toxic-appearing.   HENT:      Head: Normocephalic and atraumatic.      Mouth/Throat:      Mouth: Mucous membranes are moist.      Pharynx: Oropharynx is clear.   Eyes:      General: No scleral icterus.     Conjunctiva/sclera: Conjunctivae normal.      Pupils: Pupils are equal, round, and reactive to light.   Cardiovascular:      Rate and Rhythm: Normal rate and regular rhythm.      Pulses: Normal pulses.   Pulmonary:      Effort: Pulmonary effort is normal. No respiratory distress.      Breath sounds: Normal breath sounds. No wheezing or rales.   Abdominal:      General: Abdomen is flat.      Palpations: Abdomen is soft.      Tenderness: There is no abdominal tenderness. There is no guarding or rebound.   Musculoskeletal:         General: No swelling or tenderness. Normal range of motion.      Cervical back: Normal range of motion.      Right lower leg: No edema.      Left lower leg: No edema.   Skin:     General: Skin is warm and dry.      Capillary Refill: Capillary refill takes less than 2 seconds.      Findings: No rash.   Neurological:      General: No focal deficit present.      Mental Status: She is alert and oriented to person, place, and time. Mental status is at baseline.   Psychiatric:         Mood and Affect: Mood normal.         Behavior: Behavior normal.                Procedures:  Procedures      Medical Decision Making:      Comorbidities that affect care:    Anemia, arthritis, depression, diverticulitis, GERD, hypertension, limb " swelling, obstructive sleep apnea, renal cell carcinoma, anxiety, constipation, diabetes, epilepsy, heart disease, hyperlipidemia, sinus polyps, distal fibula fracture, shortness of breath    External Notes reviewed:    None      The following orders were placed and all results were independently analyzed by me:  Orders Placed This Encounter   Procedures    XR Chest 1 View       Medications Given in the Emergency Department:  Medications - No data to display     ED Course:         Labs:    Lab Results (last 24 hours)       ** No results found for the last 24 hours. **             Imaging:    XR Chest 1 View    Result Date: 4/3/2024  AP PORTABLE CHEST  HISTORY: Shortness of air. COVID.  COMPARISON: None available.  TECHNIQUE: AP portable chest x-ray.  FINDINGS: Cardiac and mediastinal contours are normal. Pulmonary vascularity is normal. The lungs are clear. No pneumothorax is identified. There is atherosclerotic disease in the aorta. There is elevation of the right hemidiaphragm.      No acute cardiopulmonary findings.  Electronically Signed By-Dr. Jason Saba MD On:4/3/2024 4:04 AM         Differential Diagnosis and Discussion:    Dyspnea: Differential diagnosis includes but is not limited to metabolic acidosis, neurological disorders, psychogenic, asthma, pneumothorax, upper airway obstruction, COPD, pneumonia, noncardiogenic pulmonary edema, interstitial lung disease, anemia, congestive heart failure, and pulmonary embolism    All labs were reviewed and interpreted by me.  All X-rays impressions were independently interpreted by me.  EKG was interpreted by supervising attending.    MDM  Number of Diagnoses or Management Options  Anxiety: established and worsening  COVID-19: new and requires workup  Dyspnea, unspecified type: minor     Amount and/or Complexity of Data Reviewed  Tests in the radiology section of CPT®: reviewed    Risk of Complications, Morbidity, and/or Mortality  Presenting problems:  low  Diagnostic procedures: low  Management options: low    Patient Progress  Patient progress: stable         Patient Care Considerations:    ANTIBIOTICS: I considered prescribing antibiotics as an outpatient however no bacterial focus of infection was found.      Consultants/Shared Management Plan:    None    Social Determinants of Health:    Patient is independent, reliable, and has access to care.       Disposition and Care Coordination:    Discharged: The patient is suitable and stable for discharge with no need for consideration of admission.    I have explained the patient´s condition, diagnoses and treatment plan based on the information available to me at this time. I have answered questions and addressed any concerns. The patient has a good  understanding of the patient´s diagnosis, condition, and treatment plan as can be expected at this point. The vital signs have been stable. The patient´s condition is stable and appropriate for discharge from the emergency department.      The patient will pursue further outpatient evaluation with the primary care physician or other designated or consulting physician as outlined in the discharge instructions. They are agreeable to this plan of care and follow-up instructions have been explained in detail. The patient has received these instructions in written format and has expressed an understanding of the discharge instructions. The patient is aware that any significant change in condition or worsening of symptoms should prompt an immediate return to this or the closest emergency department or call to 911.  I have explained discharge medications and the need for follow up with the patient/caretakers. This was also printed in the discharge instructions. Patient was discharged with the following medications and follow up:      Medication List      No changes were made to your prescriptions during this visit.      Tamara Singer PA-C  [address] [place] [address]  [place] [address]  [phone]    Call today  FOR FOLLOW UP       Final diagnoses:   Dyspnea, unspecified type   Anxiety   COVID-19        ED Disposition       ED Disposition   Discharge    Condition   Stable    Comment   --               This medical record created using voice recognition software.             Maryanne Michaels, LORENZO  04/03/24 0637

## 2024-04-03 NOTE — DISCHARGE INSTRUCTIONS
Rest, drink plenty of fluids.  Continue with your home medications as prescribed.  You may take over-the-counter acetaminophen and Motrin as needed for aches and pains.  Call Tamara Singer PA-C today and advise her of your ER visit so that she may review your visit records and x-ray and follow-up with her as directed.  Return to the emergency department immediately for any acutely developing respiratory distress, any chest pain, any persistent vomiting or persistent fevers of 101 or greater or any new or worse concerns.

## 2024-04-03 NOTE — TELEPHONE ENCOUNTER
Rx Request: Sarai    Last Rx'd: 7.3.23    Last visit with LORENZO Powell 3.7.24  F/U with LORENZO Powell on 9.9.24

## 2024-04-04 RX ORDER — DICYCLOMINE HCL 20 MG
TABLET ORAL
Qty: 90 TABLET | Refills: 3 | Status: SHIPPED | OUTPATIENT
Start: 2024-04-04

## 2024-04-25 ENCOUNTER — HOSPITAL ENCOUNTER (OUTPATIENT)
Dept: BONE DENSITY | Facility: HOSPITAL | Age: 74
Discharge: HOME | End: 2024-04-25
Admitting: PHYSICIAN ASSISTANT
Payer: MEDICARE

## 2024-04-25 DIAGNOSIS — Z78.0 POSTMENOPAUSAL: ICD-10-CM

## 2024-04-25 PROCEDURE — 77080 DXA BONE DENSITY AXIAL: CPT

## 2024-04-28 ENCOUNTER — TELEPHONE (OUTPATIENT)
Dept: URGENT CARE | Facility: CLINIC | Age: 74
End: 2024-04-28

## 2024-04-28 NOTE — TELEPHONE ENCOUNTER
Patient called the office, states that she has gone to [facility] and they do not have her prescription for antibiotic, only the prescription for the cough medicine.  EMR shows that the Z-Jose Guadalupe prescription was received by [facility], but I went ahead and tried to resend the medication, but the prescription failed x 2.  I called the pharmacist at [facility] and he states that he did in fact receive the Z-Jose Guadalupe prescription and has already notified the patient that he has the prescription and is working on it.

## 2024-04-29 DIAGNOSIS — M65.332 TRIGGER FINGER, LEFT MIDDLE FINGER: ICD-10-CM

## 2024-04-29 RX ORDER — DAPAGLIFLOZIN 10 MG/1
10 TABLET, FILM COATED ORAL DAILY
Qty: 90 TABLET | Refills: 0 | Status: SHIPPED | OUTPATIENT
Start: 2024-04-29

## 2024-04-29 RX ORDER — DICLOFENAC SODIUM 75 MG/1
75 TABLET, DELAYED RELEASE ORAL 2 TIMES DAILY
Qty: 60 TABLET | Refills: 1 | Status: SHIPPED | OUTPATIENT
Start: 2024-04-29

## 2024-04-29 NOTE — TELEPHONE ENCOUNTER
Pt was supposed to have labs drawn in march but did not get at her appt. Can you tell her needs labs to get refills pls?

## 2024-04-30 ENCOUNTER — PATIENT ROUNDING (BHMG ONLY) (OUTPATIENT)
Dept: URGENT CARE | Facility: CLINIC | Age: 74
End: 2024-04-30
Payer: MEDICARE

## 2024-04-30 NOTE — ED NOTES
Thank you for letting us care for you in your recent visit to our urgent care center. We would love to hear about your experience with us. Was this the first time you have visited our location?    We’re always looking for ways to make our patients’ experiences even better. Do you have any recommendations on ways we may improve?     I appreciate you taking the time to respond. Please be on the lookout for a survey about your recent visit from [employer] via text or email. We would greatly appreciate if you could fill that out and turn it back in. We want your voice to be heard and we value your feedback.   Thank you for choosing [facility] for your healthcare needs.

## 2024-05-01 ENCOUNTER — HOSPITAL ENCOUNTER (OUTPATIENT)
Dept: ULTRASOUND IMAGING | Facility: HOSPITAL | Age: 74
Discharge: HOME | End: 2024-05-01
Admitting: NURSE PRACTITIONER
Payer: MEDICARE

## 2024-05-01 DIAGNOSIS — K76.0 FATTY LIVER: ICD-10-CM

## 2024-05-01 PROCEDURE — 76705 ECHO EXAM OF ABDOMEN: CPT

## 2024-05-02 ENCOUNTER — TELEPHONE (OUTPATIENT)
Dept: GASTROENTEROLOGY | Facility: CLINIC | Age: 74
End: 2024-05-02
Payer: MEDICARE

## 2024-05-02 NOTE — TELEPHONE ENCOUNTER
"Hub staff attempted to follow warm transfer process and was unsuccessful     Caller: Sowmya Hodges \"Alecia\"    Relationship to patient: Self    Best call back number: [phone]    Patient is needing: PT CALLED STATING THAT SHE WOULD LIKE TO KNOW IF THE RESULTS FROM HER LIVER SCAN, PERFORMED ON 5/1/2024 ARE IN// PLEASE CALL PT BACK WITH UPDATE  "

## 2024-05-06 ENCOUNTER — TELEPHONE (OUTPATIENT)
Dept: OTOLARYNGOLOGY | Facility: CLINIC | Age: 74
End: 2024-05-06

## 2024-05-06 ENCOUNTER — OFFICE VISIT (OUTPATIENT)
Dept: INTERNAL MEDICINE | Facility: CLINIC | Age: 74
End: 2024-05-06
Payer: MEDICARE

## 2024-05-06 VITALS
WEIGHT: 221 LBS | HEIGHT: 67 IN | HEART RATE: 81 BPM | OXYGEN SATURATION: 97 % | TEMPERATURE: 97.1 F | DIASTOLIC BLOOD PRESSURE: 52 MMHG | BODY MASS INDEX: 34.69 KG/M2 | RESPIRATION RATE: 18 BRPM | SYSTOLIC BLOOD PRESSURE: 112 MMHG

## 2024-05-06 DIAGNOSIS — R05.1 ACUTE COUGH: Primary | ICD-10-CM

## 2024-05-06 DIAGNOSIS — E78.5 HYPERLIPIDEMIA, UNSPECIFIED HYPERLIPIDEMIA TYPE: ICD-10-CM

## 2024-05-06 DIAGNOSIS — E11.65 TYPE 2 DIABETES MELLITUS WITH HYPERGLYCEMIA, WITHOUT LONG-TERM CURRENT USE OF INSULIN: ICD-10-CM

## 2024-05-06 LAB
ALBUMIN SERPL-MCNC: 4.2 G/DL (ref 3.5–5.2)
ALBUMIN/GLOB SERPL: 1.6 G/DL
ALP SERPL-CCNC: 101 U/L (ref 39–117)
ALT SERPL W P-5'-P-CCNC: 27 U/L (ref 1–33)
ANION GAP SERPL CALCULATED.3IONS-SCNC: 8.5 MMOL/L (ref 5–15)
AST SERPL-CCNC: 24 U/L (ref 1–32)
BASOPHILS # BLD AUTO: 0.02 10*3/MM3 (ref 0–0.2)
BASOPHILS NFR BLD AUTO: 0.5 % (ref 0–1.5)
BILIRUB SERPL-MCNC: 0.3 MG/DL (ref 0–1.2)
BUN SERPL-MCNC: 11 MG/DL (ref 8–23)
BUN/CREAT SERPL: 13.9 (ref 7–25)
CALCIUM SPEC-SCNC: 9.6 MG/DL (ref 8.6–10.5)
CHLORIDE SERPL-SCNC: 107 MMOL/L (ref 98–107)
CHOLEST SERPL-MCNC: 130 MG/DL (ref 0–200)
CO2 SERPL-SCNC: 26.5 MMOL/L (ref 22–29)
CREAT SERPL-MCNC: 0.79 MG/DL (ref 0.57–1)
DEPRECATED RDW RBC AUTO: 44.8 FL (ref 37–54)
EGFRCR SERPLBLD CKD-EPI 2021: 80.1 ML/MIN/1.73
EOSINOPHIL # BLD AUTO: 0.06 10*3/MM3 (ref 0–0.4)
EOSINOPHIL NFR BLD AUTO: 1.4 % (ref 0.3–6.2)
ERYTHROCYTE [DISTWIDTH] IN BLOOD BY AUTOMATED COUNT: 13.2 % (ref 12.3–15.4)
GLOBULIN UR ELPH-MCNC: 2.7 GM/DL
GLUCOSE SERPL-MCNC: 120 MG/DL (ref 65–99)
HBA1C MFR BLD: 5.9 % (ref 4.8–5.6)
HCT VFR BLD AUTO: 38.6 % (ref 34–46.6)
HDLC SERPL-MCNC: 45 MG/DL (ref 40–60)
HGB BLD-MCNC: 12.5 G/DL (ref 12–15.9)
IMM GRANULOCYTES # BLD AUTO: 0.04 10*3/MM3 (ref 0–0.05)
IMM GRANULOCYTES NFR BLD AUTO: 1 % (ref 0–0.5)
LDLC SERPL CALC-MCNC: 60 MG/DL (ref 0–100)
LDLC/HDLC SERPL: 1.24 {RATIO}
LYMPHOCYTES # BLD AUTO: 1.4 10*3/MM3 (ref 0.7–3.1)
LYMPHOCYTES NFR BLD AUTO: 33.6 % (ref 19.6–45.3)
MCH RBC QN AUTO: 29.7 PG (ref 26.6–33)
MCHC RBC AUTO-ENTMCNC: 32.4 G/DL (ref 31.5–35.7)
MCV RBC AUTO: 91.7 FL (ref 79–97)
MONOCYTES # BLD AUTO: 0.25 10*3/MM3 (ref 0.1–0.9)
MONOCYTES NFR BLD AUTO: 6 % (ref 5–12)
NEUTROPHILS NFR BLD AUTO: 2.4 10*3/MM3 (ref 1.7–7)
NEUTROPHILS NFR BLD AUTO: 57.5 % (ref 42.7–76)
NRBC BLD AUTO-RTO: 0 /100 WBC (ref 0–0.2)
PLATELET # BLD AUTO: 278 10*3/MM3 (ref 140–450)
PMV BLD AUTO: 9.3 FL (ref 6–12)
POTASSIUM SERPL-SCNC: 4.1 MMOL/L (ref 3.5–5.2)
PROT SERPL-MCNC: 6.9 G/DL (ref 6–8.5)
RBC # BLD AUTO: 4.21 10*6/MM3 (ref 3.77–5.28)
SODIUM SERPL-SCNC: 142 MMOL/L (ref 136–145)
TRIGL SERPL-MCNC: 147 MG/DL (ref 0–150)
TSH SERPL DL<=0.05 MIU/L-ACNC: 0.61 UIU/ML (ref 0.27–4.2)
VLDLC SERPL-MCNC: 25 MG/DL (ref 5–40)
WBC NRBC COR # BLD AUTO: 4.17 10*3/MM3 (ref 3.4–10.8)

## 2024-05-06 PROCEDURE — 3074F SYST BP LT 130 MM HG: CPT | Performed by: PHYSICIAN ASSISTANT

## 2024-05-06 PROCEDURE — 3078F DIAST BP <80 MM HG: CPT | Performed by: PHYSICIAN ASSISTANT

## 2024-05-06 PROCEDURE — 80061 LIPID PANEL: CPT | Performed by: PHYSICIAN ASSISTANT

## 2024-05-06 PROCEDURE — 99214 OFFICE O/P EST MOD 30 MIN: CPT | Performed by: PHYSICIAN ASSISTANT

## 2024-05-06 PROCEDURE — 1160F RVW MEDS BY RX/DR IN RCRD: CPT | Performed by: PHYSICIAN ASSISTANT

## 2024-05-06 PROCEDURE — 84443 ASSAY THYROID STIM HORMONE: CPT | Performed by: PHYSICIAN ASSISTANT

## 2024-05-06 PROCEDURE — 83036 HEMOGLOBIN GLYCOSYLATED A1C: CPT | Performed by: PHYSICIAN ASSISTANT

## 2024-05-06 PROCEDURE — 85025 COMPLETE CBC W/AUTO DIFF WBC: CPT | Performed by: PHYSICIAN ASSISTANT

## 2024-05-06 PROCEDURE — 80053 COMPREHEN METABOLIC PANEL: CPT | Performed by: PHYSICIAN ASSISTANT

## 2024-05-06 RX ORDER — DOXYCYCLINE HYCLATE 100 MG/1
100 CAPSULE ORAL 2 TIMES DAILY
Qty: 14 CAPSULE | Refills: 0 | Status: SHIPPED | OUTPATIENT
Start: 2024-05-06 | End: 2024-05-13

## 2024-05-06 RX ORDER — GUAIFENESIN 600 MG/1
1200 TABLET, EXTENDED RELEASE ORAL 2 TIMES DAILY
Qty: 60 TABLET | Refills: 0 | Status: SHIPPED | OUTPATIENT
Start: 2024-05-06 | End: 2024-05-21

## 2024-05-06 NOTE — TELEPHONE ENCOUNTER
Caller: MADDIE CARBONE    Relationship to patient: SELF    Best call back number: [phone]    Chief complaint: PATIENT WAS NOT ABLE TO GET THERE IN TIME FOR HER APPOINTMENT THIS MORNING, ASKING IF THERE IS ANYTHING LATER TODAY. IF NOT SHE WOULD LIKE TO RESCHEDULE    Type of visit: FOLLOW UP     BEST NUMBER IS HER CELL AND IF SHE DOES NOT ANSWER YOU MAY LEAVE A MESSAGE.    HUB AGENT UNABLE TO WARM TRANSFER

## 2024-05-09 DIAGNOSIS — C64.1 RENAL CELL ADENOCARCINOMA, RIGHT: Primary | ICD-10-CM

## 2024-05-14 NOTE — TELEPHONE ENCOUNTER
The original prescription was discontinued on 11/20/2023 by Violet Escobedo RN for the following reason: *Therapy completed. Renewing this prescription may not be appropriate.

## 2024-05-16 RX ORDER — TIRZEPATIDE 2.5 MG/.5ML
INJECTION, SOLUTION SUBCUTANEOUS
OUTPATIENT
Start: 2024-05-16

## 2024-06-24 RX ORDER — BUSPIRONE HYDROCHLORIDE 15 MG/1
15 TABLET ORAL 3 TIMES DAILY PRN
Qty: 270 TABLET | Refills: 0 | Status: SHIPPED | OUTPATIENT
Start: 2024-06-24 | End: 2024-06-26

## 2024-06-24 RX ORDER — TRAZODONE HYDROCHLORIDE 50 MG/1
50 TABLET ORAL
Qty: 90 TABLET | Refills: 1 | Status: SHIPPED | OUTPATIENT
Start: 2024-06-24

## 2024-06-24 RX ORDER — SEMAGLUTIDE 1.34 MG/ML
1 INJECTION, SOLUTION SUBCUTANEOUS
Qty: 3 ML | Refills: 2 | Status: SHIPPED | OUTPATIENT
Start: 2024-06-24

## 2024-06-26 ENCOUNTER — OFFICE VISIT (OUTPATIENT)
Dept: GASTROENTEROLOGY | Facility: CLINIC | Age: 74
End: 2024-06-26
Payer: MEDICARE

## 2024-06-26 VITALS
HEIGHT: 67 IN | HEART RATE: 67 BPM | DIASTOLIC BLOOD PRESSURE: 48 MMHG | WEIGHT: 234.2 LBS | BODY MASS INDEX: 36.76 KG/M2 | SYSTOLIC BLOOD PRESSURE: 114 MMHG

## 2024-06-26 DIAGNOSIS — K74.69 OTHER CIRRHOSIS OF LIVER: Primary | ICD-10-CM

## 2024-06-26 DIAGNOSIS — E66.01 CLASS 2 SEVERE OBESITY WITH SERIOUS COMORBIDITY AND BODY MASS INDEX (BMI) OF 36.0 TO 36.9 IN ADULT, UNSPECIFIED OBESITY TYPE: ICD-10-CM

## 2024-06-26 DIAGNOSIS — M65.332 TRIGGER FINGER, LEFT MIDDLE FINGER: ICD-10-CM

## 2024-06-26 DIAGNOSIS — K58.1 IRRITABLE BOWEL SYNDROME WITH CONSTIPATION: ICD-10-CM

## 2024-06-26 RX ORDER — DICLOFENAC SODIUM 75 MG/1
75 TABLET, DELAYED RELEASE ORAL 2 TIMES DAILY
Qty: 60 TABLET | Refills: 1 | Status: SHIPPED | OUTPATIENT
Start: 2024-06-26

## 2024-06-26 RX ORDER — TENAPANOR HYDROCHLORIDE 53.2 MG/1
50 TABLET ORAL 2 TIMES DAILY
Qty: 60 TABLET | Refills: 3 | Status: SHIPPED | OUTPATIENT
Start: 2024-06-26

## 2024-06-26 NOTE — PROGRESS NOTES
Patient Name: Sowmya Hodges   Visit Date: 06/26/2024   Patient ID: [id number]  Provider: LORENZO Paz    Sex: female  Location:  Location Address:  Location Phone: [phone]  [place] [address] [phone]    YOB: 1950  Age: 73 y.o.   Primary Care Provider Tamara Singer PA-C      Referring Provider: No ref. provider found        Chief Complaint  Fatty Liver (Follow up ), Abdominal Pain (Lower ABD pain/cramping some days, taking bentyl ), and Constipation (Pt states having 1 Bm weekly with straining, having hard stool )    History of Present Illness  Patient initially presented in 2017 with abdominal pain and heartburn.  GES December 2017 within normal limits.  PMH significant for renal cell carcinoma 2014 status post partial right nephrectomy, follows w DR Butts.  History of fatty liver seen on CT scan, hepatic work-up has been completed 4/2020 and negative.  Patient did report a cousin with cirrhosis and was told he had a genetic issue   Pt dx w cirrhosis 5/2023, noted on CT.  Hep A&B vaccines ordered.  9/1/2023 Fibroscan S3, F3  History of intermittent diarrhea-Xifaxan improved symptoms February 2022; has used Bentyl intermittently also      LAST EGD for persistent nausea vomiting 6/9/2022-esophagus normal, gastritis noted-biopsy with mild chronic inactive gastritis, normal duodenum, Carafate was given  LAST Colonoscopy 1/17/2023 for lower abd pain/cramping : good bowel prep, small polyp in the ascending colon-adenomatous, repeat 5 yrs, random colon bx negative       Patient last seen 3/7/2024: She had lost 20 pounds intentionally with walking and dietary change, she had no GI complaints at the time    Ultrasound liver 5/1/2024: Coarsened hepatic echotexture compatible with cirrhosis, no liver lesion  Labs in May look great - LFTs improved to normal    Today, pt made appt sooner d/t constipation. Has c/o abd cramping also. Takes Bentyl for this.   Has straining and only  having 1 BM/week. Taking Colace and Dulcolax.   Patient has gained 9 pounds since last visit, she's had trouble walking in the heat lately. Plans to start walking in the mall  Has noticed some early satiety, no other GI c/o      Past Medical History:   Diagnosis Date    Anemia     Anxiety     Arthritis     Bronchitis     Constipation     Depression     Diabetes     Diverticulitis     Dyspnea 04/03/2024    Epilepsy     Fracture of distal fibula 09/26/2017    right    GERD (gastroesophageal reflux disease)     Heart disease     HTN (hypertension)     Hyperlipidemia     Limb swelling     Lower abdominal pain     Nausea and vomiting in adult 06/03/2022    TAYLOR (obstructive sleep apnea)     Pneumonia     Polyp, sinus maxillary     Renal cell adenocarcinoma, right 12/10/2014    Seasonal allergies     Shortness of breath        Past Surgical History:   Procedure Laterality Date    CARDIAC SURGERY      open heart at age 4     CARDIAC VALVE SURGERY      CHOLECYSTECTOMY      COLONOSCOPY  04/12/2017    dr monique    COLONOSCOPY N/A 1/17/2023    Procedure: COLONOSCOPY WITH BIOPSIES, POLYPECTOMY;  Surgeon: Sean Monique MD;  Location: [facility];  Service: Gastroenterology;  Laterality: N/A;  COLON POLYP    ENDOSCOPY  04/12/2017    dr monique    ENDOSCOPY N/A 6/9/2022    Procedure: ESOPHAGOGASTRODUODENOSCOPY with biopsy;  Surgeon: Sean Monique MD;  Location: [facility];  Service: Gastroenterology;  Laterality: N/A;  gastritis    EYE SURGERY      implant    NEPHRECTOMY PARTIAL Right 12/02/2014    robotic right partial nephrectomy w dr garvey    OTHER SURGICAL HISTORY      joint surgery    SALIVARY GLAND SURGERY      TONSILLECTOMY      TOTAL KNEE ARTHROPLASTY Right     UPPER GASTROINTESTINAL ENDOSCOPY  12/22/2020    Dr. Monique    WRIST SURGERY Left        Allergies   Allergen Reactions    Codeine Rash              Family History   Problem Relation Age of Onset    Arthritis Mother     Arthritis Father      "Cancer Father     Arthritis Sister     Arthritis Brother     Colon cancer Neg Hx     Malig Hyperthermia Neg Hx         Social History     Tobacco Use    Smoking status: Former     Current packs/day: 0.00     Average packs/day: 1 pack/day for 5.0 years (5.0 ttl pk-yrs)     Types: Cigarettes     Start date:      Quit date:      Years since quittin.5    Smokeless tobacco: Never    Tobacco comments:     Less than 5 years   Vaping Use    Vaping status: Never Used   Substance Use Topics    Alcohol use: Never     Comment: drinks less than 1 drink per day     Drug use: Never       Objective     Vital Signs:   /48 (BP Location: Left arm, Patient Position: Sitting, Cuff Size: Adult)   Pulse 67   Ht 170.2 cm (67\")   Wt 106 kg (234 lb 3.2 oz)   BMI 36.68 kg/m²       Physical Exam  Constitutional:       General: The patient is not in acute distress.     Appearance: Normal appearance.   HENT:      Head: Normocephalic and atraumatic.      Nose: Nose normal.   Pulmonary:      Effort: Pulmonary effort is normal. No respiratory distress.   Abdominal:      General: Abdomen is flat.      Palpations: Abdomen is soft. There is no mass.      Tenderness: There is no abdominal tenderness. There is no guarding.   Musculoskeletal:      Cervical back: Neck supple.      Right lower leg: No edema.      Left lower leg: No edema.   Skin:     General: Skin is warm and dry.   Neurological:      General: No focal deficit present.      Mental Status: The patient is alert and oriented to person, place, and time.      Gait: Gait normal.   Psychiatric:         Mood and Affect: Mood normal.         Speech: Speech normal.         Behavior: Behavior normal.         Thought Content: Thought content normal.     Result Review :   The following data was reviewed by: LORENZO Paz on 2024:    CBC w/diff          3/1/2024    06:57 2024    12:09   CBC w/Diff   WBC 6.16  4.17    RBC 4.00  4.21    Hemoglobin 12.5  " 12.5    Hematocrit 37.6  38.6    MCV 94.0  91.7    MCH 31.3  29.7    MCHC 33.2  32.4    RDW 12.9  13.2    Platelets 236  278    Neutrophil Rel %  57.5    Immature Granulocyte Rel %  1.0    Lymphocyte Rel %  33.6    Monocyte Rel %  6.0    Eosinophil Rel %  1.4    Basophil Rel %  0.5      CMP          3/1/2024    06:57 5/6/2024    12:09   CMP   Glucose  120    BUN  11    Creatinine  0.79    EGFR  80.1    Sodium  142    Potassium  4.1    Chloride  107    Calcium  9.6    Total Protein 6.8  6.9    Albumin 4.1  4.2    Globulin  2.7    Total Bilirubin 0.4  0.3    Alkaline Phosphatase 109  101    AST (SGOT) 36  24    ALT (SGPT) 41  27    Albumin/Globulin Ratio  1.6    BUN/Creatinine Ratio  13.9    Anion Gap  8.5        AFP   AFP   Date Value Ref Range Status   04/23/2020 1.1 0.0 - 8.7 ng/mL Final     Comment:     Test Information: AFP (Tumor Marker)      The Roche e601 by ECLIA method was used to perform this  test. Results obtained with different assay methods cannot be  used interchangeably.  This result is not absolute evidence of the presence or absence  of Malignant disease. AFP results should be considered with  clinical evaluation and other diagnostic procedures.  ---------------------------------------------------------------  AFP Values in Benign and Malignant Disease  Sample Category  N   0-8.7  8.8-15.0 15.1-20.0 20.1-100  >100  Appar Healthy   140   136     4          0         0       0  males            70   68      2          0         0       0  females          70   68      2          0         0       0  Malig.Diseases  165   112     6          4        15      28  Test.Cancer  -seminoma        9     6      1          0         2       0  -non-seminoma    62   22      3          4        11      22  Liver Cancer     12    8      0          0         1       3  Other Cancer  -colorectal.     13   12      1          0         0       0  -genitourinary   36   35      1          0         0       0  -ovarian           8    6      0          0         0       2  -pancreatic       8    8      0          0         0       0  -breast           5    5      0          0         0       0  -stomach          2    1      0          0         1       0  -other           10    9      0          0         0       1  Benign Diseases  76   74      1          0         1       0  -cirrhosis       16   16      0          0         0       0  -hepatitis       17   15      1          0         1       0  -pancreatitis     5    5      0          0         0       0  -gi and pid      10   10      0          0         0       0  -BPH             21   21      0          0         0       0  -urogenital       7    7      0          0         0       0  In this study,95% of the apparently healthy subjects had  AFP values < or =8.30 ng/ml.  -------------------------------------------------------------        PT/INR   Protime   Date Value Ref Range Status   03/01/2024 13.3 11.8 - 14.9 Seconds Final     INR   Date Value Ref Range Status   03/01/2024 0.99 0.86 - 1.15 Final               Assessment and Plan    Diagnoses and all orders for this visit:    1. Other cirrhosis of liver (Primary)  -     CBC (No Diff); Future  -     Comprehensive Metabolic Panel; Future  -     Protime-INR; Future  -     AFP Tumor Marker; Future  -     US Liver; Future  -     Case Request; Standing  -     Case Request    2. Irritable bowel syndrome with constipation    3. Class 2 severe obesity with serious comorbidity and body mass index (BMI) of 36.0 to 36.9 in adult, unspecified obesity type    Other orders  -     Tenapanor HCl (Ibsrela) 50 MG tablet; Take 1 tablet by mouth 2 (Two) Times a Day.  Dispense: 60 tablet; Refill: 3  -     Follow Anesthesia Guidelines / Protocol; Standing  -     Follow Anesthesia Guidelines / Protocol; Future  -     Obtain Informed Consent; Future  -     Verify NPO; Standing  -     Obtain Informed Consent; Standing              Follow Up   Return  in about 6 months (around 12/26/2024), or if symptoms worsen or fail to improve.  Labs and US in Nov  Try Ibsrela - samples - start with daily dosing , call if any issues.  Stop Bentyl, stop stool softeners and laxatives, r/w pt Bentyl's SE of constipation likely worsening sx and cramping may be due to constipation.   EGD to screen for EV Surgical Risk and Benefits: Possible risks/complications, benefits, and alternatives to surgical or invasive procedure have been explained to patient and/or legal guardian; risks include bleeding, infection, and perforation. Patient has been evaluated and can tolerate anesthesia and/or sedation. Risks, benefits, and alternatives to anesthesia and sedation have been explained to patient and/or legal guardian.   Hold Ozempic x 7 days before EGD    Patient was given instructions and counseling regarding her condition or for health maintenance advice. Please see specific information pulled into the AVS if appropriate.

## 2024-06-26 NOTE — PATIENT INSTRUCTIONS
Take Ibsrela once daily w meals - let me know how this works for you  Stop bentyl, stop stool softeners and laxatives

## 2024-07-01 ENCOUNTER — TELEPHONE (OUTPATIENT)
Dept: GASTROENTEROLOGY | Facility: CLINIC | Age: 74
End: 2024-07-01

## 2024-07-01 RX ORDER — BUSPIRONE HYDROCHLORIDE 15 MG/1
15 TABLET ORAL 3 TIMES DAILY PRN
Qty: 270 TABLET | Refills: 0 | OUTPATIENT
Start: 2024-07-01

## 2024-07-01 RX ORDER — LEVOCETIRIZINE DIHYDROCHLORIDE 5 MG/1
5 TABLET, FILM COATED ORAL EVERY EVENING
Qty: 90 TABLET | Refills: 1 | Status: SHIPPED | OUTPATIENT
Start: 2024-07-01

## 2024-07-01 RX ORDER — LEVETIRACETAM 750 MG/1
750 TABLET ORAL 2 TIMES DAILY
Qty: 180 TABLET | Refills: 1 | Status: SHIPPED | OUTPATIENT
Start: 2024-07-01

## 2024-07-01 NOTE — TELEPHONE ENCOUNTER
The original prescription was discontinued on 6/26/2024 by Senait Salinas MA for the following reason: Patient Reported Not Taking. Renewing this prescription may not be appropriate.

## 2024-07-01 NOTE — TELEPHONE ENCOUNTER
Hub staff attempted to follow warm transfer process and was unsuccessful     Caller: KAMARI CARBONE    Relationship to patient: SELF    Best call back number: [phone]    Patient is needing: PT WOULD LIKE CLARIFICATION ON ORDERS PLACED BY PORTER RAYMOND ON 6.26

## 2024-07-25 ENCOUNTER — TELEPHONE (OUTPATIENT)
Dept: INTERNAL MEDICINE | Facility: CLINIC | Age: 74
End: 2024-07-25
Payer: MEDICARE

## 2024-07-29 ENCOUNTER — OFFICE VISIT (OUTPATIENT)
Dept: ORTHOPEDIC SURGERY | Facility: CLINIC | Age: 74
End: 2024-07-29
Payer: MEDICARE

## 2024-07-29 VITALS
BODY MASS INDEX: 36.73 KG/M2 | DIASTOLIC BLOOD PRESSURE: 62 MMHG | WEIGHT: 234 LBS | HEART RATE: 75 BPM | OXYGEN SATURATION: 94 % | SYSTOLIC BLOOD PRESSURE: 108 MMHG | HEIGHT: 67 IN

## 2024-07-29 DIAGNOSIS — M65.332 TRIGGER FINGER, LEFT MIDDLE FINGER: Primary | ICD-10-CM

## 2024-07-29 PROCEDURE — 1159F MED LIST DOCD IN RCRD: CPT | Performed by: STUDENT IN AN ORGANIZED HEALTH CARE EDUCATION/TRAINING PROGRAM

## 2024-07-29 PROCEDURE — 3074F SYST BP LT 130 MM HG: CPT | Performed by: STUDENT IN AN ORGANIZED HEALTH CARE EDUCATION/TRAINING PROGRAM

## 2024-07-29 PROCEDURE — 20550 NJX 1 TENDON SHEATH/LIGAMENT: CPT | Performed by: STUDENT IN AN ORGANIZED HEALTH CARE EDUCATION/TRAINING PROGRAM

## 2024-07-29 PROCEDURE — 99213 OFFICE O/P EST LOW 20 MIN: CPT | Performed by: STUDENT IN AN ORGANIZED HEALTH CARE EDUCATION/TRAINING PROGRAM

## 2024-07-29 PROCEDURE — 3078F DIAST BP <80 MM HG: CPT | Performed by: STUDENT IN AN ORGANIZED HEALTH CARE EDUCATION/TRAINING PROGRAM

## 2024-07-29 PROCEDURE — 1160F RVW MEDS BY RX/DR IN RCRD: CPT | Performed by: STUDENT IN AN ORGANIZED HEALTH CARE EDUCATION/TRAINING PROGRAM

## 2024-07-29 RX ORDER — LIDOCAINE HYDROCHLORIDE 10 MG/ML
1 INJECTION, SOLUTION INFILTRATION; PERINEURAL
Status: COMPLETED | OUTPATIENT
Start: 2024-07-29 | End: 2024-07-29

## 2024-07-29 RX ORDER — TRIAMCINOLONE ACETONIDE 40 MG/ML
40 INJECTION, SUSPENSION INTRA-ARTICULAR; INTRAMUSCULAR
Status: COMPLETED | OUTPATIENT
Start: 2024-07-29 | End: 2024-07-29

## 2024-07-29 RX ADMIN — LIDOCAINE HYDROCHLORIDE 1 ML: 10 INJECTION, SOLUTION INFILTRATION; PERINEURAL at 10:45

## 2024-07-29 RX ADMIN — TRIAMCINOLONE ACETONIDE 40 MG: 40 INJECTION, SUSPENSION INTRA-ARTICULAR; INTRAMUSCULAR at 10:45

## 2024-07-29 NOTE — PROGRESS NOTES
"Chief Complaint  Follow-up of the Left Hand    Subjective          Sowmya Hodges presents to [facility] for a follow up for her left hand.     History of Present Illness    The patient presents here today for a follow up for her left hand. She was last seen in the office on 10/25/23 where she had a left 3rd trigger finger injection. She reports this gave her relief however this has been bothering her for several months now. She reports no new injury, trauma or falls since her last visit.     Allergies   Allergen Reactions    Codeine Rash               Social History     Socioeconomic History    Marital status: Single   Tobacco Use    Smoking status: Former     Current packs/day: 0.00     Average packs/day: 1 pack/day for 5.0 years (5.0 ttl pk-yrs)     Types: Cigarettes     Start date:      Quit date:      Years since quittin.5    Smokeless tobacco: Never    Tobacco comments:     Less than 5 years   Vaping Use    Vaping status: Never Used   Substance and Sexual Activity    Alcohol use: Never     Comment: drinks less than 1 drink per day     Drug use: Never    Sexual activity: Not Currently     Partners: Male     Birth control/protection: None        I reviewed the patient's chief complaint, history of present illness, review of systems, past medical history, surgical history, family history, social history, medications, and allergy list.     REVIEW OF SYSTEMS    Constitutional: Denies fevers, chills, weight loss  Cardiovascular: Denies chest pain, shortness of breath  Skin: Denies rashes, acute skin changes  Neurologic: Denies headache, loss of consciousness  MSK: Left hand pain       Objective   Vital Signs:   /62   Pulse 75   Ht 170.2 cm (67\")   Wt 106 kg (234 lb)   SpO2 94%   BMI 36.65 kg/m²     Body mass index is 36.65 kg/m².    Physical Exam    General: Alert. No acute distress.   Left hand- active triggering and locking to the 3rd finger. Neurovascularly " intact. Sensation to light touch median, radial, ulnar nerve. Positive AIN, PIN, ulnar nerve motor function. Positive pulses. Tender to the A-1 pulley of the left 3rd finger.     Small Joint Arthrocentesis  Consent given by: patient  Site marked: site marked  Timeout: Immediately prior to procedure a time out was called to verify the correct patient, procedure, equipment, support staff and site/side marked as required   Supporting Documentation  Indications: pain   Procedure Details  Location: long finger (LEFT) -   Preparation: Patient was prepped and draped in the usual sterile fashion  Needle gauge: 23 G.  Medications administered: 1 mL lidocaine 1 %; 40 mg triamcinolone acetonide 40 MG/ML  Patient tolerance: patient tolerated the procedure well with no immediate complications    This injection documentation was Scribed for Mustapha Alfaro MD by Kenia Castillo.  07/29/24   10:50 EDT    Imaging Results (Most Recent)       None                     Assessment and Plan        No results found.     Diagnoses and all orders for this visit:    1. Trigger finger, left middle finger (Primary)        The patient presents here today for a follow up for her left 3rd trigger finger.     Discussed the risks and benefits of a left third trigger finger injection today in the office. Patient expressed understanding and wishes to proceed. She tolerated the injection well and without any complications.       Call or return if worsening symptoms.    Scribed for Mustapha Alfaro MD by Kimberli Theodore  07/29/2024   10:43 EDT         Follow Up       PRN     Patient was given instructions and counseling regarding her condition or for health maintenance advice. Please see specific information pulled into the AVS if appropriate.     I have personally performed the services described in this document as scribed by the above individual and it is both accurate and complete. Mustapha Alfaro MD 07/29/24 11:53 EDT

## 2024-07-31 NOTE — TELEPHONE ENCOUNTER
Called and spoke with pt, explained to pt letter was drafted and at the  for , pt verbalized understanding and had no further questions at this time.

## 2024-08-08 ENCOUNTER — OFFICE VISIT (OUTPATIENT)
Dept: INTERNAL MEDICINE | Facility: CLINIC | Age: 74
End: 2024-08-08
Payer: MEDICARE

## 2024-08-08 VITALS
RESPIRATION RATE: 16 BRPM | TEMPERATURE: 97 F | BODY MASS INDEX: 35 KG/M2 | HEIGHT: 67 IN | SYSTOLIC BLOOD PRESSURE: 110 MMHG | OXYGEN SATURATION: 96 % | DIASTOLIC BLOOD PRESSURE: 64 MMHG | HEART RATE: 82 BPM | WEIGHT: 223 LBS

## 2024-08-08 DIAGNOSIS — E78.5 HYPERLIPIDEMIA, UNSPECIFIED HYPERLIPIDEMIA TYPE: ICD-10-CM

## 2024-08-08 DIAGNOSIS — E11.65 TYPE 2 DIABETES MELLITUS WITH HYPERGLYCEMIA, WITHOUT LONG-TERM CURRENT USE OF INSULIN: Primary | ICD-10-CM

## 2024-08-08 DIAGNOSIS — F41.1 GENERALIZED ANXIETY DISORDER: ICD-10-CM

## 2024-08-08 DIAGNOSIS — F32.A DEPRESSION, UNSPECIFIED DEPRESSION TYPE: ICD-10-CM

## 2024-08-08 PROCEDURE — 99214 OFFICE O/P EST MOD 30 MIN: CPT | Performed by: PHYSICIAN ASSISTANT

## 2024-08-08 PROCEDURE — 3044F HG A1C LEVEL LT 7.0%: CPT | Performed by: PHYSICIAN ASSISTANT

## 2024-08-08 PROCEDURE — 1126F AMNT PAIN NOTED NONE PRSNT: CPT | Performed by: PHYSICIAN ASSISTANT

## 2024-08-08 PROCEDURE — 3078F DIAST BP <80 MM HG: CPT | Performed by: PHYSICIAN ASSISTANT

## 2024-08-08 PROCEDURE — 3074F SYST BP LT 130 MM HG: CPT | Performed by: PHYSICIAN ASSISTANT

## 2024-08-08 PROCEDURE — 1159F MED LIST DOCD IN RCRD: CPT | Performed by: PHYSICIAN ASSISTANT

## 2024-08-08 PROCEDURE — 1160F RVW MEDS BY RX/DR IN RCRD: CPT | Performed by: PHYSICIAN ASSISTANT

## 2024-08-08 RX ORDER — PANTOPRAZOLE SODIUM 40 MG/1
40 TABLET, DELAYED RELEASE ORAL DAILY
Qty: 90 TABLET | Refills: 1 | Status: SHIPPED | OUTPATIENT
Start: 2024-08-08

## 2024-08-08 RX ORDER — LISINOPRIL 2.5 MG/1
2.5 TABLET ORAL DAILY
Qty: 90 TABLET | Refills: 1 | Status: SHIPPED | OUTPATIENT
Start: 2024-08-08

## 2024-08-08 RX ORDER — BUSPIRONE HYDROCHLORIDE 5 MG/1
5 TABLET ORAL 3 TIMES DAILY
Qty: 90 TABLET | Refills: 1 | Status: SHIPPED | OUTPATIENT
Start: 2024-08-08

## 2024-08-08 RX ORDER — ESCITALOPRAM OXALATE 20 MG/1
20 TABLET ORAL DAILY
Qty: 90 TABLET | Refills: 1 | Status: SHIPPED | OUTPATIENT
Start: 2024-08-08

## 2024-08-08 NOTE — PROGRESS NOTES
I have reviewed the notes, assessments, and/or procedures performed by Tamara Singer PA-C, I concur with her documentation of Sowmya Hodges.

## 2024-08-08 NOTE — PROGRESS NOTES
Chief Complaint  Diabetes, Hyperlipidemia, and Anxiety    Subjective          Sowmya Hodges presents to [facility]  Diabetes    Hyperlipidemia    Anxiety      Pt here for follow up  DM: bg running 124   Taking ozempic without GI symptoms   Denies chest pain, palpitations, dizziness, sob   Denies numbness/tingling of feet    Anxiety: pt does not feel klonopin is working as well, requesting increase in dose. She is using it as needed but needing it more often. She takes it when she feels upset, overwhelmed or anxious. She does not feel dose is working well.   Admits to feeling down at times but not recently   Denies s/hi   States she never got rx for buspar     GERD: improved with protonix     Past Medical History:   Diagnosis Date    Anemia     Anxiety     Arthritis     Bronchitis     Constipation     Depression     Diabetes     Diverticulitis     Dyspnea 04/03/2024    Epilepsy     Fracture of distal fibula 09/26/2017    right    GERD (gastroesophageal reflux disease)     Heart disease     HTN (hypertension)     Hyperlipidemia     Limb swelling     Lower abdominal pain     Nausea and vomiting in adult 06/03/2022    TAYLOR (obstructive sleep apnea)     Pneumonia     Polyp, sinus maxillary     Renal cell adenocarcinoma, right 12/10/2014    Seasonal allergies     Shortness of breath         Past Surgical History:   Procedure Laterality Date    CARDIAC SURGERY      open heart at age 4     CARDIAC VALVE SURGERY      CHOLECYSTECTOMY      COLONOSCOPY  04/12/2017    dr monique    COLONOSCOPY N/A 1/17/2023    Procedure: COLONOSCOPY WITH BIOPSIES, POLYPECTOMY;  Surgeon: Sean Monique MD;  Location: [facility]COPY;  Service: Gastroenterology;  Laterality: N/A;  COLON POLYP    ENDOSCOPY  04/12/2017    dr monique    ENDOSCOPY N/A 6/9/2022    Procedure: ESOPHAGOGASTRODUODENOSCOPY with biopsy;  Surgeon: Sean Monique MD;  Location: [facility] ENDOSCOPY;  Service:  Gastroenterology;  Laterality: N/A;  gastritis    EYE SURGERY      implant    NEPHRECTOMY PARTIAL Right 12/02/2014    robotic right partial nephrectomy w dr garvey    OTHER SURGICAL HISTORY      joint surgery    SALIVARY GLAND SURGERY      TONSILLECTOMY      TOTAL KNEE ARTHROPLASTY Right     UPPER GASTROINTESTINAL ENDOSCOPY  12/22/2020    Dr. Oneil    WRIST SURGERY Left         Current Outpatient Medications on File Prior to Visit   Medication Sig Dispense Refill    atorvastatin (LIPITOR) 20 MG tablet Take 1 tablet by mouth Daily. 90 tablet 1    Blood Glucose Monitoring Suppl (Accu-Chek Sabrina Plus) w/Device kit 1 kit Take As Directed. To checke blood sugar up to 3 times a day. DX E11.9 1 kit 0    clonazePAM (KlonoPIN) 1 MG tablet Take 1 tablet by mouth 2 (Two) Times a Day As Needed for Anxiety. 60 tablet 2    dapagliflozin Propanediol (Farxiga) 10 MG tablet TAKE 10 MG BY MOUTH DAILY. 90 tablet 0    diclofenac (VOLTAREN) 75 MG EC tablet TAKE 1 TABLET BY MOUTH TWICE A DAY 60 tablet 1    Diclofenac Sodium (VOLTAREN) 1 % gel gel Apply 4 g topically to the appropriate area as directed 3 (Three) Times a Day As Needed.      fluticasone (FLONASE) 50 MCG/ACT nasal spray USE 1-2 SPRAYS IN EACH NOSTRIL ONCE DAILY AS NEEDED 48 mL 1    Insulin Pen Needle (Pen Needles) 32G X 4 MM misc 1 each 1 (One) Time Per Week. 90 each 1    levETIRAcetam (KEPPRA) 750 MG tablet TAKE 1 TABLET BY MOUTH TWICE A  tablet 1    levocetirizine (XYZAL) 5 MG tablet TAKE 1 TABLET BY MOUTH EVERY DAY IN THE EVENING 90 tablet 1    lidocaine (LIDODERM) 5 % Place 1 patch on the skin as directed by provider Daily.      oxyCODONE-acetaminophen (PERCOCET) 7.5-325 MG per tablet Take 1 tablet every 8 hours by oral route as needed for 30 days.      Ozempic, 1 MG/DOSE, 4 MG/3ML solution pen-injector INJECT 1 MG UNDER THE SKIN INTO THE APPROPRIATE AREA AS DIRECTED EVERY 7 (SEVEN) DAYS. 3 mL 2    traZODone (DESYREL) 50 MG tablet TAKE 1 TABLET BY MOUTH EVERYDAY  "AT BEDTIME 90 tablet 1    vitamin D3 125 MCG (5000 UT) capsule capsule Take 1 capsule by mouth Daily.      [DISCONTINUED] escitalopram (LEXAPRO) 20 MG tablet Take 1 tablet by mouth Daily. 90 tablet 1    [DISCONTINUED] lisinopril (PRINIVIL,ZESTRIL) 2.5 MG tablet Take 1 tablet by mouth Daily. 90 tablet 1    [DISCONTINUED] pantoprazole (PROTONIX) 40 MG EC tablet Take 1 tablet by mouth Daily. 90 tablet 1    [DISCONTINUED] baclofen (LIORESAL) 10 MG tablet Take 1 tablet by mouth 3 times a day.      [DISCONTINUED] Tenapanor HCl (Ibsrela) 50 MG tablet Take 1 tablet by mouth 2 (Two) Times a Day. (Patient not taking: Reported on 2024) 60 tablet 3     No current facility-administered medications on file prior to visit.        Allergies   Allergen Reactions    Codeine Rash              Social History     Tobacco Use   Smoking Status Former    Current packs/day: 0.00    Average packs/day: 1 pack/day for 5.0 years (5.0 ttl pk-yrs)    Types: Cigarettes    Start date:     Quit date: 2000    Years since quittin.6   Smokeless Tobacco Never   Tobacco Comments    Less than 5 years          Objective   Vital Signs:   /64 (BP Location: Left arm, Patient Position: Sitting, Cuff Size: Large Adult)   Pulse 82   Temp 97 °F (36.1 °C) (Temporal)   Resp 16   Ht 170.2 cm (67\")   Wt 101 kg (223 lb)   SpO2 96%   BMI 34.93 kg/m²     Physical Exam  Vitals reviewed.   Constitutional:       Appearance: Normal appearance.   HENT:      Head: Normocephalic and atraumatic.      Nose: Nose normal.      Mouth/Throat:      Mouth: Mucous membranes are moist.   Eyes:      Extraocular Movements: Extraocular movements intact.      Conjunctiva/sclera: Conjunctivae normal.      Pupils: Pupils are equal, round, and reactive to light.   Cardiovascular:      Rate and Rhythm: Normal rate and regular rhythm.   Pulmonary:      Effort: Pulmonary effort is normal.      Breath sounds: Normal breath sounds.   Abdominal:      General: Abdomen is " flat. Bowel sounds are normal.      Palpations: Abdomen is soft.   Musculoskeletal:         General: Normal range of motion.      Right foot: No deformity.      Left foot: No deformity.   Feet:      Right foot:      Protective Sensation: 6 sites tested.  6 sites sensed.      Skin integrity: No ulcer, blister, skin breakdown, erythema, callus, dry skin or fissure.      Left foot:      Protective Sensation: 6 sites tested.  6 sites sensed.      Skin integrity: No ulcer, blister, skin breakdown, erythema, callus, dry skin or fissure.      Comments: Diabetic Foot Exam Performed      Neurological:      General: No focal deficit present.      Mental Status: She is alert and oriented to person, place, and time.   Psychiatric:         Mood and Affect: Mood normal.        Result Review :                   Assessment and Plan    Diagnoses and all orders for this visit:    1. Type 2 diabetes mellitus with hyperglycemia, without long-term current use of insulin (Primary)  Assessment & Plan:  Labs today, will adjust medicine if indicated based on results.    Orders:  -     Comprehensive Metabolic Panel  -     CBC & Differential  -     TSH  -     Hemoglobin A1c  -     MicroAlbumin, Urine, Random - Urine, Clean Catch    2. Depression, unspecified depression type    3. Generalized anxiety disorder  Assessment & Plan:  Discussed anxiety is not well controlled. Previously prescribed buspar to use prn anxiety and then use Klonopin prn severe symptoms. Pt states she never got the buspar, will start. Discussed that it can be taken up to 3 times per day and used as needed. Discussed possible side effects of dizziness, confusion, headache. Patient understands and agrees.        4. Hyperlipidemia, unspecified hyperlipidemia type  Assessment & Plan:  Return to clinic for fasting labs.    Orders:  -     Lipid Panel    Other orders  -     lisinopril (PRINIVIL,ZESTRIL) 2.5 MG tablet; Take 1 tablet by mouth Daily.  Dispense: 90 tablet; Refill:  1  -     busPIRone (BUSPAR) 5 MG tablet; Take 1 tablet by mouth 3 (Three) Times a Day.  Dispense: 90 tablet; Refill: 1  -     escitalopram (LEXAPRO) 20 MG tablet; Take 1 tablet by mouth Daily.  Dispense: 90 tablet; Refill: 1  -     pantoprazole (PROTONIX) 40 MG EC tablet; Take 1 tablet by mouth Daily.  Dispense: 90 tablet; Refill: 1        Follow Up   Return in about 6 weeks (around 9/19/2024).  Patient was given instructions and counseling regarding her condition or for health maintenance advice. Please see specific information pulled into the AVS if appropriate.

## 2024-08-08 NOTE — ASSESSMENT & PLAN NOTE
Discussed anxiety is not well controlled. Previously prescribed buspar to use prn anxiety and then use Klonopin prn severe symptoms. Pt states she never got the buspar, will start. Discussed that it can be taken up to 3 times per day and used as needed. Discussed possible side effects of dizziness, confusion, headache. Patient understands and agrees.

## 2024-08-12 ENCOUNTER — CLINICAL SUPPORT (OUTPATIENT)
Dept: INTERNAL MEDICINE | Facility: CLINIC | Age: 74
End: 2024-08-12
Payer: MEDICARE

## 2024-08-12 ENCOUNTER — TELEPHONE (OUTPATIENT)
Dept: INTERNAL MEDICINE | Facility: CLINIC | Age: 74
End: 2024-08-12

## 2024-08-12 DIAGNOSIS — M25.511 ACUTE PAIN OF RIGHT SHOULDER: Primary | ICD-10-CM

## 2024-08-12 DIAGNOSIS — D64.9 ANEMIA, UNSPECIFIED TYPE: Primary | ICD-10-CM

## 2024-08-12 DIAGNOSIS — C64.1 RENAL CELL ADENOCARCINOMA, RIGHT: ICD-10-CM

## 2024-08-12 DIAGNOSIS — M19.011 PRIMARY OSTEOARTHRITIS OF RIGHT SHOULDER: Primary | ICD-10-CM

## 2024-08-12 DIAGNOSIS — K76.0 FATTY LIVER: ICD-10-CM

## 2024-08-12 LAB
ALBUMIN SERPL-MCNC: 4.3 G/DL (ref 3.5–5.2)
ALBUMIN/GLOB SERPL: 1.6 G/DL
ALP SERPL-CCNC: 103 U/L (ref 39–117)
ALT SERPL W P-5'-P-CCNC: 50 U/L (ref 1–33)
ANION GAP SERPL CALCULATED.3IONS-SCNC: 13 MMOL/L (ref 5–15)
AST SERPL-CCNC: 42 U/L (ref 1–32)
BASOPHILS # BLD AUTO: 0.02 10*3/MM3 (ref 0–0.2)
BASOPHILS NFR BLD AUTO: 0.4 % (ref 0–1.5)
BILIRUB SERPL-MCNC: 0.5 MG/DL (ref 0–1.2)
BUN SERPL-MCNC: 19 MG/DL (ref 8–23)
BUN/CREAT SERPL: 20.7 (ref 7–25)
CALCIUM SPEC-SCNC: 9.7 MG/DL (ref 8.6–10.5)
CHLORIDE SERPL-SCNC: 101 MMOL/L (ref 98–107)
CHOLEST SERPL-MCNC: 144 MG/DL (ref 0–200)
CO2 SERPL-SCNC: 26 MMOL/L (ref 22–29)
CREAT SERPL-MCNC: 0.92 MG/DL (ref 0.57–1)
DEPRECATED RDW RBC AUTO: 43.6 FL (ref 37–54)
EGFRCR SERPLBLD CKD-EPI 2021: 66.7 ML/MIN/1.73
EOSINOPHIL # BLD AUTO: 0.04 10*3/MM3 (ref 0–0.4)
EOSINOPHIL NFR BLD AUTO: 0.8 % (ref 0.3–6.2)
ERYTHROCYTE [DISTWIDTH] IN BLOOD BY AUTOMATED COUNT: 13.1 % (ref 12.3–15.4)
GLOBULIN UR ELPH-MCNC: 2.7 GM/DL
GLUCOSE SERPL-MCNC: 112 MG/DL (ref 65–99)
HBA1C MFR BLD: 6 % (ref 4.8–5.6)
HCT VFR BLD AUTO: 39.9 % (ref 34–46.6)
HDLC SERPL-MCNC: 52 MG/DL (ref 40–60)
HGB BLD-MCNC: 13.3 G/DL (ref 12–15.9)
IMM GRANULOCYTES # BLD AUTO: 0.01 10*3/MM3 (ref 0–0.05)
IMM GRANULOCYTES NFR BLD AUTO: 0.2 % (ref 0–0.5)
INR PPP: 0.96 (ref 0.86–1.15)
LDLC SERPL CALC-MCNC: 73 MG/DL (ref 0–100)
LDLC/HDLC SERPL: 1.38 {RATIO}
LYMPHOCYTES # BLD AUTO: 1.52 10*3/MM3 (ref 0.7–3.1)
LYMPHOCYTES NFR BLD AUTO: 31 % (ref 19.6–45.3)
MCH RBC QN AUTO: 30.5 PG (ref 26.6–33)
MCHC RBC AUTO-ENTMCNC: 33.3 G/DL (ref 31.5–35.7)
MCV RBC AUTO: 91.5 FL (ref 79–97)
MONOCYTES # BLD AUTO: 0.26 10*3/MM3 (ref 0.1–0.9)
MONOCYTES NFR BLD AUTO: 5.3 % (ref 5–12)
NEUTROPHILS NFR BLD AUTO: 3.05 10*3/MM3 (ref 1.7–7)
NEUTROPHILS NFR BLD AUTO: 62.3 % (ref 42.7–76)
NRBC BLD AUTO-RTO: 0 /100 WBC (ref 0–0.2)
PLATELET # BLD AUTO: 227 10*3/MM3 (ref 140–450)
PMV BLD AUTO: 9.2 FL (ref 6–12)
POTASSIUM SERPL-SCNC: 4.5 MMOL/L (ref 3.5–5.2)
PROT SERPL-MCNC: 7 G/DL (ref 6–8.5)
PROTHROMBIN TIME: 13 SECONDS (ref 11.8–14.9)
RBC # BLD AUTO: 4.36 10*6/MM3 (ref 3.77–5.28)
SODIUM SERPL-SCNC: 140 MMOL/L (ref 136–145)
TRIGL SERPL-MCNC: 102 MG/DL (ref 0–150)
TSH SERPL DL<=0.05 MIU/L-ACNC: 1.15 UIU/ML (ref 0.27–4.2)
VLDLC SERPL-MCNC: 19 MG/DL (ref 5–40)
WBC NRBC COR # BLD AUTO: 4.9 10*3/MM3 (ref 3.4–10.8)

## 2024-08-12 PROCEDURE — 84443 ASSAY THYROID STIM HORMONE: CPT | Performed by: PHYSICIAN ASSISTANT

## 2024-08-12 PROCEDURE — 80061 LIPID PANEL: CPT | Performed by: PHYSICIAN ASSISTANT

## 2024-08-12 PROCEDURE — 85025 COMPLETE CBC W/AUTO DIFF WBC: CPT | Performed by: PHYSICIAN ASSISTANT

## 2024-08-12 PROCEDURE — 83036 HEMOGLOBIN GLYCOSYLATED A1C: CPT | Performed by: PHYSICIAN ASSISTANT

## 2024-08-12 PROCEDURE — 80053 COMPREHEN METABOLIC PANEL: CPT | Performed by: PHYSICIAN ASSISTANT

## 2024-08-12 PROCEDURE — 36415 COLL VENOUS BLD VENIPUNCTURE: CPT | Performed by: PHYSICIAN ASSISTANT

## 2024-08-12 PROCEDURE — 85610 PROTHROMBIN TIME: CPT | Performed by: NURSE PRACTITIONER

## 2024-08-12 NOTE — TELEPHONE ENCOUNTER
Pt wanted to let Tamara Singer know that she's having a lot of pain in her RT shoulder and was wondering if an order could be place for an x-ray.

## 2024-08-12 NOTE — TELEPHONE ENCOUNTER
Please tell her I put in xray order. She can make nurse visit to have done here or can go to [facility] or [facility] to have completed.

## 2024-08-12 NOTE — PROGRESS NOTES
Venipuncture Blood Specimen Collection  Venipuncture performed in [facility] by Carmen Casillas RN with good hemostasis. Patient tolerated the procedure well without complications.   08/12/24   Carmen Casillas RN

## 2024-08-19 ENCOUNTER — TELEPHONE (OUTPATIENT)
Dept: GASTROENTEROLOGY | Facility: CLINIC | Age: 74
End: 2024-08-19
Payer: MEDICARE

## 2024-08-19 NOTE — TELEPHONE ENCOUNTER
"Hub staff attempted to follow warm transfer process and was unsuccessful     Caller: MADDIE CARBONE    Relationship to patient: SELF    Best call back number: [phone]    Patient is needing: PATIENT CALLED IN AND WANTS TO KNOW IF SHE HAS TO BE \"NPO\" FOR HER UPCOMING PROCEDURE SCHEDULED FOR 08/26/2024. PLEASE CALL BACK TO ADVISE.        "

## 2024-08-19 NOTE — PRE-PROCEDURE INSTRUCTIONS
Left detailed voice message with instructions on upcoming EGD with Dr. Oneil on Monday, 8/26 with arrival time of 1000. Instructed to use Entrance C and to have an adult . Instructed on NPO after midnight with exception of sips of water and meds until 0800. Instructed to hold Ozempic for 7 days and to hold Diclofenac and Farxiga Monday morning. Left call back number.

## 2024-08-19 NOTE — TELEPHONE ENCOUNTER
Spoke with patient about her EGD instructions, confirmed with patient no food or drink after midnight the night before procedure. Patient gave verbal understanding.

## 2024-08-21 ENCOUNTER — TELEPHONE (OUTPATIENT)
Dept: ORTHOPEDIC SURGERY | Facility: CLINIC | Age: 74
End: 2024-08-21
Payer: MEDICARE

## 2024-08-21 ENCOUNTER — TELEPHONE (OUTPATIENT)
Dept: INTERNAL MEDICINE | Facility: CLINIC | Age: 74
End: 2024-08-21
Payer: MEDICARE

## 2024-08-21 ENCOUNTER — TELEPHONE (OUTPATIENT)
Dept: ORTHOPEDICS | Facility: OTHER | Age: 74
End: 2024-08-21
Payer: MEDICARE

## 2024-08-21 NOTE — TELEPHONE ENCOUNTER
Spoke with patient and informed her she will need to contact medical records at the hospital, pt verbalized understanding.

## 2024-08-21 NOTE — TELEPHONE ENCOUNTER
Caller: MADDIE    Relationship: ANY    Best call back number: [phone]    What is the best time to reach you: MORNING    Who are you requesting to speak with (clinical staff, provider,  specific staff member):     What was the call regarding: APPT WITH DR THOMAS RIGHT SHOULDER ON 8/27/24- DR THOMAS DID SX ABOUT 10 YRS AGO- SHE WOULD LIKE TO CANCEL AND SCHEDULE WITH DR SAWYER INSTEAD FOR A NEW PROBLEM- NEEDS REVIEW- PLS CALL    Is it okay if the provider responds through MyChart: CALL

## 2024-08-21 NOTE — TELEPHONE ENCOUNTER
"Caller: Sowmya Hodges \"Alecia\"    Relationship: Self    Best call back number: [phone]     What form or medical record are you requesting: RIGHT SHOULDER XRAY RESULTS ON A DISK    Who is requesting this form or medical record from you: SELF    How would you like to receive the form or medical records (pick-up, mail, fax):     Timeframe paperwork needed: ASAP    Additional notes: PATIENT WOULD LIKE A CALL WHEN THE DISK IS READY TO BE PICKED UP  "

## 2024-08-23 ENCOUNTER — TELEPHONE (OUTPATIENT)
Dept: GASTROENTEROLOGY | Facility: CLINIC | Age: 74
End: 2024-08-23
Payer: MEDICARE

## 2024-08-23 NOTE — TELEPHONE ENCOUNTER
Procedure Date: 8/26/24  Procedure: EGD    We rec'd notification from PAT RN that patient did not answer call. Called pt. No answer. Left message for pt to call back.

## 2024-08-26 ENCOUNTER — PREP FOR SURGERY (OUTPATIENT)
Dept: OTHER | Facility: HOSPITAL | Age: 74
End: 2024-08-26
Payer: MEDICARE

## 2024-08-26 ENCOUNTER — HOSPITAL ENCOUNTER (OUTPATIENT)
Facility: HOSPITAL | Age: 74
Setting detail: HOSPITAL OUTPATIENT SURGERY
Discharge: HOME | End: 2024-08-26
Attending: INTERNAL MEDICINE | Admitting: INTERNAL MEDICINE
Payer: MEDICARE

## 2024-08-26 ENCOUNTER — TELEPHONE (OUTPATIENT)
Dept: GASTROENTEROLOGY | Facility: CLINIC | Age: 74
End: 2024-08-26
Payer: MEDICARE

## 2024-08-26 VITALS
HEART RATE: 60 BPM | SYSTOLIC BLOOD PRESSURE: 123 MMHG | TEMPERATURE: 97 F | RESPIRATION RATE: 18 BRPM | BODY MASS INDEX: 35.15 KG/M2 | OXYGEN SATURATION: 99 % | DIASTOLIC BLOOD PRESSURE: 65 MMHG | WEIGHT: 224.43 LBS

## 2024-08-26 DIAGNOSIS — K74.69 OTHER CIRRHOSIS OF LIVER: Primary | ICD-10-CM

## 2024-08-26 PROCEDURE — G0463 HOSPITAL OUTPT CLINIC VISIT: HCPCS | Performed by: INTERNAL MEDICINE

## 2024-08-26 RX ORDER — SODIUM CHLORIDE, SODIUM LACTATE, POTASSIUM CHLORIDE, CALCIUM CHLORIDE 600; 310; 30; 20 MG/100ML; MG/100ML; MG/100ML; MG/100ML
30 INJECTION, SOLUTION INTRAVENOUS CONTINUOUS
Status: DISCONTINUED | OUTPATIENT
Start: 2024-08-26 | End: 2024-08-26 | Stop reason: HOSPADM

## 2024-08-26 NOTE — TELEPHONE ENCOUNTER
"Hub staff attempted to follow warm transfer process and was unsuccessful     Caller: Sowmya Hodges \"Alecia\"    Relationship to patient: Self    Best call back number: [phone]    Patient is needing: PT CALLED TO SCHEDULE REPEAT PROCEDURE FROM 8/26/2024// PLEASE CALL PT BACK  "

## 2024-08-26 NOTE — TELEPHONE ENCOUNTER
Can you put a new case request in for Ms Hodges. She took her Ozempic today and they was unable to due her EGD today. When I called scheduling they said that they need a new case request to schedule. thank you

## 2024-08-26 NOTE — H&P
Pre Procedure History & Physical    Chief Complaint:   Cirrhosis    Subjective     HPI:   Cirrhosis, rule out varices    Past Medical History:   Past Medical History:   Diagnosis Date    Anemia     Anxiety     Arthritis     Bronchitis     Constipation     Depression     Diabetes     Diverticulitis     Dyspnea 04/03/2024    Epilepsy     Fracture of distal fibula 09/26/2017    right    GERD (gastroesophageal reflux disease)     Heart disease     HTN (hypertension)     Hyperlipidemia     Limb swelling     Lower abdominal pain     Nausea and vomiting in adult 06/03/2022    TAYLOR (obstructive sleep apnea)     Pneumonia     Polyp, sinus maxillary     Renal cell adenocarcinoma, right 12/10/2014    Seasonal allergies     Shortness of breath        Past Surgical History:  Past Surgical History:   Procedure Laterality Date    CARDIAC SURGERY      open heart at age 4     CARDIAC VALVE SURGERY      CHOLECYSTECTOMY      COLONOSCOPY  04/12/2017    dr monique    COLONOSCOPY N/A 1/17/2023    Procedure: COLONOSCOPY WITH BIOPSIES, POLYPECTOMY;  Surgeon: Sean Monique MD;  Location: [facility] ENDOSCOPY;  Service: Gastroenterology;  Laterality: N/A;  COLON POLYP    ENDOSCOPY  04/12/2017    dr monique    ENDOSCOPY N/A 6/9/2022    Procedure: ESOPHAGOGASTRODUODENOSCOPY with biopsy;  Surgeon: Sean Monique MD;  Location: [facility] ENDOSCOPY;  Service: Gastroenterology;  Laterality: N/A;  gastritis    EYE SURGERY      implant    NEPHRECTOMY PARTIAL Right 12/02/2014    robotic right partial nephrectomy w dr garvey    OTHER SURGICAL HISTORY      joint surgery    SALIVARY GLAND SURGERY      TONSILLECTOMY      TOTAL KNEE ARTHROPLASTY Right     UPPER GASTROINTESTINAL ENDOSCOPY  12/22/2020    Dr. Monique    WRIST SURGERY Left        Family History:  Family History   Problem Relation Age of Onset    Arthritis Mother     Arthritis Father     Cancer Father     Arthritis Sister     Arthritis Brother     Colon cancer Neg Hx     Malig Hyperthermia  Neg Hx        Social History:   reports that she quit smoking about 24 years ago. Her smoking use included cigarettes. She started smoking about 29 years ago. She has a 5 pack-year smoking history. She has never used smokeless tobacco. She reports that she does not drink alcohol and does not use drugs.    Medications:   Medications Prior to Admission   Medication Sig Dispense Refill Last Dose    atorvastatin (LIPITOR) 20 MG tablet Take 1 tablet by mouth Daily. 90 tablet 1     Blood Glucose Monitoring Suppl (Accu-Chek Sabrina Plus) w/Device kit 1 kit Take As Directed. To checke blood sugar up to 3 times a day. DX E11.9 1 kit 0     busPIRone (BUSPAR) 5 MG tablet Take 1 tablet by mouth 3 (Three) Times a Day. 90 tablet 1     clonazePAM (KlonoPIN) 1 MG tablet Take 1 tablet by mouth 2 (Two) Times a Day As Needed for Anxiety. 60 tablet 2     dapagliflozin Propanediol (Farxiga) 10 MG tablet TAKE 10 MG BY MOUTH DAILY. 90 tablet 0     diclofenac (VOLTAREN) 75 MG EC tablet TAKE 1 TABLET BY MOUTH TWICE A DAY 60 tablet 1     Diclofenac Sodium (VOLTAREN) 1 % gel gel Apply 4 g topically to the appropriate area as directed 3 (Three) Times a Day As Needed.       escitalopram (LEXAPRO) 20 MG tablet Take 1 tablet by mouth Daily. 90 tablet 1     fluticasone (FLONASE) 50 MCG/ACT nasal spray USE 1-2 SPRAYS IN EACH NOSTRIL ONCE DAILY AS NEEDED 48 mL 1     Insulin Pen Needle (Pen Needles) 32G X 4 MM misc 1 each 1 (One) Time Per Week. 90 each 1     levETIRAcetam (KEPPRA) 750 MG tablet TAKE 1 TABLET BY MOUTH TWICE A  tablet 1     levocetirizine (XYZAL) 5 MG tablet TAKE 1 TABLET BY MOUTH EVERY DAY IN THE EVENING 90 tablet 1     lidocaine (LIDODERM) 5 % Place 1 patch on the skin as directed by provider Daily.       lisinopril (PRINIVIL,ZESTRIL) 2.5 MG tablet Take 1 tablet by mouth Daily. 90 tablet 1     oxyCODONE-acetaminophen (PERCOCET) 7.5-325 MG per tablet Take 1 tablet every 8 hours by oral route as needed for 30 days.       Ozempic,  1 MG/DOSE, 4 MG/3ML solution pen-injector INJECT 1 MG UNDER THE SKIN INTO THE APPROPRIATE AREA AS DIRECTED EVERY 7 (SEVEN) DAYS. 3 mL 2     pantoprazole (PROTONIX) 40 MG EC tablet Take 1 tablet by mouth Daily. 90 tablet 1     traZODone (DESYREL) 50 MG tablet TAKE 1 TABLET BY MOUTH EVERYDAY AT BEDTIME 90 tablet 1     vitamin D3 125 MCG (5000 UT) capsule capsule Take 1 capsule by mouth Daily.          Allergies:  Codeine        Objective     Blood pressure 123/65, pulse 60, temperature 97 °F (36.1 °C), temperature source Temporal, resp. rate 18, weight 102 kg (224 lb 6.9 oz), SpO2 99%, not currently breastfeeding.    Physical Exam   Constitutional: Pt is oriented to person, place, and time and well-developed, well-nourished, and in no distress.   Mouth/Throat: Oropharynx is clear and moist.   Neck: Normal range of motion.   Cardiovascular: Normal rate, regular rhythm and normal heart sounds.    Pulmonary/Chest: Effort normal and breath sounds normal.   Abdominal: Soft. Nontender  Skin: Skin is warm and dry.   Psychiatric: Mood, memory, affect and judgment normal.     Assessment & Plan     Diagnosis:  Cirrhosis, rule out esophageal varices    Anticipated Surgical Procedure:  egd    The risks, benefits, and alternatives of this procedure have been discussed with the patient or the responsible party- the patient understands and agrees to proceed.

## 2024-08-26 NOTE — NURSING NOTE
Pt took ozempic on Friday. Cancelled per anesthesia. Instructed to reschedule and not to take med for full week.

## 2024-08-29 NOTE — H&P
Pre Procedure History & Physical    Chief Complaint:   Procedure not done    Subjective     HPI:   Procedure not done    Past Medical History:   Past Medical History:   Diagnosis Date    Anemia     Anxiety     Arthritis     Bronchitis     Constipation     Depression     Diabetes     Diverticulitis     Dyspnea 04/03/2024    Epilepsy     Fracture of distal fibula 09/26/2017    right    GERD (gastroesophageal reflux disease)     Heart disease     HTN (hypertension)     Hyperlipidemia     Limb swelling     Lower abdominal pain     Nausea and vomiting in adult 06/03/2022    TAYLOR (obstructive sleep apnea)     Pneumonia     Polyp, sinus maxillary     Renal cell adenocarcinoma, right 12/10/2014    Seasonal allergies     Shortness of breath        Past Surgical History:  Past Surgical History:   Procedure Laterality Date    CARDIAC SURGERY      open heart at age 4     CARDIAC VALVE SURGERY      CHOLECYSTECTOMY      COLONOSCOPY  04/12/2017    dr monique    COLONOSCOPY N/A 1/17/2023    Procedure: COLONOSCOPY WITH BIOPSIES, POLYPECTOMY;  Surgeon: Sean Monique MD;  Location: [facility] ENDOSCOPY;  Service: Gastroenterology;  Laterality: N/A;  COLON POLYP    ENDOSCOPY  04/12/2017    dr monique    ENDOSCOPY N/A 6/9/2022    Procedure: ESOPHAGOGASTRODUODENOSCOPY with biopsy;  Surgeon: Sean Monique MD;  Location: [facility] ENDOSCOPY;  Service: Gastroenterology;  Laterality: N/A;  gastritis    EYE SURGERY      implant    NEPHRECTOMY PARTIAL Right 12/02/2014    robotic right partial nephrectomy w dr garvey    OTHER SURGICAL HISTORY      joint surgery    SALIVARY GLAND SURGERY      TONSILLECTOMY      TOTAL KNEE ARTHROPLASTY Right     UPPER GASTROINTESTINAL ENDOSCOPY  12/22/2020    Dr. Monique    WRIST SURGERY Left        Family History:  Family History   Problem Relation Age of Onset    Arthritis Mother     Arthritis Father     Cancer Father     Arthritis Sister     Arthritis Brother     Colon cancer Neg Hx     Malig Hyperthermia  Neg Hx        Social History:   reports that she quit smoking about 24 years ago. Her smoking use included cigarettes. She started smoking about 29 years ago. She has a 5 pack-year smoking history. She has never used smokeless tobacco. She reports that she does not drink alcohol and does not use drugs.    Medications:   No medications prior to admission.       Allergies:  Codeine        Objective     Blood pressure 123/65, pulse 60, temperature 97 °F (36.1 °C), temperature source Temporal, resp. rate 18, weight 102 kg (224 lb 6.9 oz), SpO2 99%, not currently breastfeeding.    Physical Exam   Constitutional: Pt is oriented to person, place, and time and well-developed, well-nourished, and in no distress.   Mouth/Throat: Oropharynx is clear and moist.   Neck: Normal range of motion.   Cardiovascular: Normal rate, regular rhythm and normal heart sounds.    Pulmonary/Chest: Effort normal and breath sounds normal.   Abdominal: Soft. Nontender  Skin: Skin is warm and dry.   Psychiatric: Mood, memory, affect and judgment normal.     Assessment & Plan     Diagnosis:  Procedure not done    Anticipated Surgical Procedure:  Canceled the procedure    The risks, benefits, and alternatives of this procedure have been discussed with the patient or the responsible party- the patient understands and agrees to proceed.

## 2024-09-03 ENCOUNTER — TELEPHONE (OUTPATIENT)
Dept: GASTROENTEROLOGY | Facility: CLINIC | Age: 74
End: 2024-09-03
Payer: MEDICARE

## 2024-09-03 RX ORDER — DICYCLOMINE HCL 20 MG
TABLET ORAL
Qty: 90 TABLET | Refills: 0 | Status: SHIPPED | OUTPATIENT
Start: 2024-09-03

## 2024-09-03 NOTE — TELEPHONE ENCOUNTER
Pt called requesting a refill of bentyl. She is experiencing center ABD pain, she describes as a sharp pain with nausea but she hasn't eaten today. She denies having any vomiting or blood in stool, last BM was yesterday, soft stool. Patient is agreeable to go to the ER if the pain worsens.

## 2024-09-04 ENCOUNTER — APPOINTMENT (OUTPATIENT)
Dept: GENERAL RADIOLOGY | Facility: HOSPITAL | Age: 74
End: 2024-09-04
Payer: MEDICARE

## 2024-09-04 ENCOUNTER — HOSPITAL ENCOUNTER (EMERGENCY)
Facility: HOSPITAL | Age: 74
Discharge: HOME | End: 2024-09-05
Attending: EMERGENCY MEDICINE
Payer: MEDICARE

## 2024-09-04 ENCOUNTER — APPOINTMENT (OUTPATIENT)
Dept: CT IMAGING | Facility: HOSPITAL | Age: 74
End: 2024-09-04
Payer: MEDICARE

## 2024-09-04 DIAGNOSIS — Q62.11 STENOSIS OF URETEROPELVIC JUNCTION (UPJ): ICD-10-CM

## 2024-09-04 DIAGNOSIS — N39.0 URINARY TRACT INFECTION IN FEMALE: Primary | ICD-10-CM

## 2024-09-04 DIAGNOSIS — N13.30 HYDRONEPHROSIS, UNSPECIFIED HYDRONEPHROSIS TYPE: ICD-10-CM

## 2024-09-04 DIAGNOSIS — K59.00 CONSTIPATION, UNSPECIFIED CONSTIPATION TYPE: ICD-10-CM

## 2024-09-04 LAB
ALBUMIN SERPL-MCNC: 3.8 G/DL (ref 3.5–5.2)
ALBUMIN/GLOB SERPL: 1.2 G/DL
ALP SERPL-CCNC: 92 U/L (ref 39–117)
ALT SERPL W P-5'-P-CCNC: 27 U/L (ref 1–33)
ANION GAP SERPL CALCULATED.3IONS-SCNC: 9.2 MMOL/L (ref 5–15)
AST SERPL-CCNC: 24 U/L (ref 1–32)
BACTERIA UR QL AUTO: ABNORMAL /HPF
BASOPHILS # BLD AUTO: 0.03 10*3/MM3 (ref 0–0.2)
BASOPHILS NFR BLD AUTO: 0.4 % (ref 0–1.5)
BILIRUB SERPL-MCNC: 0.6 MG/DL (ref 0–1.2)
BILIRUB UR QL STRIP: NEGATIVE
BUN SERPL-MCNC: 17 MG/DL (ref 8–23)
BUN/CREAT SERPL: 14.3 (ref 7–25)
CALCIUM SPEC-SCNC: 9.2 MG/DL (ref 8.6–10.5)
CHLORIDE SERPL-SCNC: 105 MMOL/L (ref 98–107)
CLARITY UR: CLEAR
CO2 SERPL-SCNC: 25.8 MMOL/L (ref 22–29)
COLOR UR: YELLOW
CREAT SERPL-MCNC: 1.19 MG/DL (ref 0.57–1)
D-LACTATE SERPL-SCNC: 0.9 MMOL/L (ref 0.5–2)
DEPRECATED RDW RBC AUTO: 42.1 FL (ref 37–54)
EGFRCR SERPLBLD CKD-EPI 2021: 48.7 ML/MIN/1.73
EOSINOPHIL # BLD AUTO: 0.04 10*3/MM3 (ref 0–0.4)
EOSINOPHIL NFR BLD AUTO: 0.5 % (ref 0.3–6.2)
ERYTHROCYTE [DISTWIDTH] IN BLOOD BY AUTOMATED COUNT: 13.1 % (ref 12.3–15.4)
GLOBULIN UR ELPH-MCNC: 3.3 GM/DL
GLUCOSE SERPL-MCNC: 149 MG/DL (ref 65–99)
GLUCOSE UR STRIP-MCNC: ABNORMAL MG/DL
HCT VFR BLD AUTO: 34.9 % (ref 34–46.6)
HGB BLD-MCNC: 11.8 G/DL (ref 12–15.9)
HGB UR QL STRIP.AUTO: NEGATIVE
HOLD SPECIMEN: NORMAL
HOLD SPECIMEN: NORMAL
HYALINE CASTS UR QL AUTO: ABNORMAL /LPF
IMM GRANULOCYTES # BLD AUTO: 0.03 10*3/MM3 (ref 0–0.05)
IMM GRANULOCYTES NFR BLD AUTO: 0.4 % (ref 0–0.5)
KETONES UR QL STRIP: NEGATIVE
LEUKOCYTE ESTERASE UR QL STRIP.AUTO: ABNORMAL
LIPASE SERPL-CCNC: 35 U/L (ref 13–60)
LYMPHOCYTES # BLD AUTO: 1.47 10*3/MM3 (ref 0.7–3.1)
LYMPHOCYTES NFR BLD AUTO: 19.2 % (ref 19.6–45.3)
MCH RBC QN AUTO: 30.3 PG (ref 26.6–33)
MCHC RBC AUTO-ENTMCNC: 33.8 G/DL (ref 31.5–35.7)
MCV RBC AUTO: 89.7 FL (ref 79–97)
MONOCYTES # BLD AUTO: 0.48 10*3/MM3 (ref 0.1–0.9)
MONOCYTES NFR BLD AUTO: 6.3 % (ref 5–12)
NEUTROPHILS NFR BLD AUTO: 5.6 10*3/MM3 (ref 1.7–7)
NEUTROPHILS NFR BLD AUTO: 73.2 % (ref 42.7–76)
NITRITE UR QL STRIP: NEGATIVE
NRBC BLD AUTO-RTO: 0 /100 WBC (ref 0–0.2)
PH UR STRIP.AUTO: 6 [PH] (ref 5–8)
PLATELET # BLD AUTO: 222 10*3/MM3 (ref 140–450)
PMV BLD AUTO: 8.8 FL (ref 6–12)
POTASSIUM SERPL-SCNC: 3.8 MMOL/L (ref 3.5–5.2)
PROT SERPL-MCNC: 7.1 G/DL (ref 6–8.5)
PROT UR QL STRIP: NEGATIVE
RBC # BLD AUTO: 3.89 10*6/MM3 (ref 3.77–5.28)
RBC # UR STRIP: ABNORMAL /HPF
REF LAB TEST METHOD: ABNORMAL
SODIUM SERPL-SCNC: 140 MMOL/L (ref 136–145)
SP GR UR STRIP: 1.01 (ref 1–1.03)
SQUAMOUS #/AREA URNS HPF: ABNORMAL /HPF
UROBILINOGEN UR QL STRIP: ABNORMAL
WBC # UR STRIP: ABNORMAL /HPF
WBC NRBC COR # BLD AUTO: 7.65 10*3/MM3 (ref 3.4–10.8)
WHOLE BLOOD HOLD COAG: NORMAL
WHOLE BLOOD HOLD SPECIMEN: NORMAL

## 2024-09-04 PROCEDURE — 36415 COLL VENOUS BLD VENIPUNCTURE: CPT

## 2024-09-04 PROCEDURE — 99285 EMERGENCY DEPT VISIT HI MDM: CPT

## 2024-09-04 PROCEDURE — 96374 THER/PROPH/DIAG INJ IV PUSH: CPT

## 2024-09-04 PROCEDURE — 83690 ASSAY OF LIPASE: CPT

## 2024-09-04 PROCEDURE — 85025 COMPLETE CBC W/AUTO DIFF WBC: CPT

## 2024-09-04 PROCEDURE — 80053 COMPREHEN METABOLIC PANEL: CPT

## 2024-09-04 PROCEDURE — 25810000003 SODIUM CHLORIDE 0.9 % SOLUTION

## 2024-09-04 PROCEDURE — 25010000002 ACETAMINOPHEN 10 MG/ML SOLUTION

## 2024-09-04 PROCEDURE — 74018 RADEX ABDOMEN 1 VIEW: CPT

## 2024-09-04 PROCEDURE — 83605 ASSAY OF LACTIC ACID: CPT

## 2024-09-04 PROCEDURE — 74177 CT ABD & PELVIS W/CONTRAST: CPT

## 2024-09-04 PROCEDURE — 25510000001 IOPAMIDOL PER 1 ML: Performed by: EMERGENCY MEDICINE

## 2024-09-04 PROCEDURE — 81001 URINALYSIS AUTO W/SCOPE: CPT

## 2024-09-04 RX ORDER — ACETAMINOPHEN 10 MG/ML
1000 INJECTION, SOLUTION INTRAVENOUS ONCE
Status: COMPLETED | OUTPATIENT
Start: 2024-09-04 | End: 2024-09-04

## 2024-09-04 RX ORDER — SODIUM CHLORIDE 0.9 % (FLUSH) 0.9 %
10 SYRINGE (ML) INJECTION AS NEEDED
Status: DISCONTINUED | OUTPATIENT
Start: 2024-09-04 | End: 2024-09-05 | Stop reason: HOSPADM

## 2024-09-04 RX ORDER — IOPAMIDOL 755 MG/ML
100 INJECTION, SOLUTION INTRAVASCULAR
Status: COMPLETED | OUTPATIENT
Start: 2024-09-05 | End: 2024-09-04

## 2024-09-04 RX ADMIN — ACETAMINOPHEN 1000 MG: 10 INJECTION INTRAVENOUS at 23:24

## 2024-09-04 RX ADMIN — IOPAMIDOL 80 ML: 755 INJECTION, SOLUTION INTRAVENOUS at 23:43

## 2024-09-04 RX ADMIN — SODIUM CHLORIDE 1000 ML: 9 INJECTION, SOLUTION INTRAVENOUS at 23:20

## 2024-09-05 ENCOUNTER — TELEPHONE (OUTPATIENT)
Dept: GASTROENTEROLOGY | Facility: CLINIC | Age: 74
End: 2024-09-05
Payer: MEDICARE

## 2024-09-05 VITALS
HEIGHT: 67 IN | TEMPERATURE: 98.4 F | RESPIRATION RATE: 18 BRPM | HEART RATE: 65 BPM | WEIGHT: 223.33 LBS | SYSTOLIC BLOOD PRESSURE: 120 MMHG | OXYGEN SATURATION: 94 % | BODY MASS INDEX: 35.05 KG/M2 | DIASTOLIC BLOOD PRESSURE: 57 MMHG

## 2024-09-05 RX ORDER — CEPHALEXIN 500 MG/1
500 CAPSULE ORAL 2 TIMES DAILY
Qty: 9 CAPSULE | Refills: 0 | Status: SHIPPED | OUTPATIENT
Start: 2024-09-05 | End: 2024-09-10

## 2024-09-05 RX ADMIN — CEPHALEXIN 500 MG: 250 CAPSULE ORAL at 01:00

## 2024-09-05 NOTE — ED PROVIDER NOTES
Time: 8:54 PM EDT  Date of encounter:  9/4/2024  Independent Historian/Clinical History and Information was obtained by:   Patient    History is limited by: N/A    Chief Complaint   Patient presents with    Abdominal Pain         History of Present Illness:  Patient is a 74 y.o. year old female who presents to the emergency department for evaluation of lower abdominal pain and cramping sensation for 2 to 3 days.  Patient reports she also struggles with constipation and her last bowel movement was reported to have been yesterday.  She does see GI specialist who prescribes her Bentyl but the Bentyl is reported to not be helping with her discomfort.  She has had no nausea, vomiting, diarrhea, fever, or chills.(YONNY Hernandez, APRN) patient has a history of constipation for which she took Linzess for that seemed to help however she was taken off and has a history of IBS.  Patient states that her Bentyl typically takes with the pain but has not been helping this time.      Patient Care Team  Primary Care Provider: Tamara Singer PA-C    Past Medical History:     Allergies   Allergen Reactions    Codeine Rash            Past Medical History:   Diagnosis Date    Anemia     Anxiety     Arthritis     Bronchitis     Constipation     Depression     Diabetes     Diverticulitis     Dyspnea 04/03/2024    Epilepsy     Fracture of distal fibula 09/26/2017    right    GERD (gastroesophageal reflux disease)     Heart disease     HTN (hypertension)     Hyperlipidemia     Limb swelling     Lower abdominal pain     Nausea and vomiting in adult 06/03/2022    TAYLOR (obstructive sleep apnea)     Pneumonia     Polyp, sinus maxillary     Renal cell adenocarcinoma, right 12/10/2014    Seasonal allergies     Shortness of breath      Past Surgical History:   Procedure Laterality Date    CARDIAC SURGERY      open heart at age 4     CARDIAC VALVE SURGERY      CHOLECYSTECTOMY      COLONOSCOPY  04/12/2017    dr monique    COLONOSCOPY N/A  1/17/2023    Procedure: COLONOSCOPY WITH BIOPSIES, POLYPECTOMY;  Surgeon: Sean Monique MD;  Location: [facility] ENDOSCOPY;  Service: Gastroenterology;  Laterality: N/A;  COLON POLYP    ENDOSCOPY  04/12/2017    dr monique    ENDOSCOPY N/A 6/9/2022    Procedure: ESOPHAGOGASTRODUODENOSCOPY with biopsy;  Surgeon: Sean Monique MD;  Location: [facility] ENDOSCOPY;  Service: Gastroenterology;  Laterality: N/A;  gastritis    EYE SURGERY      implant    NEPHRECTOMY PARTIAL Right 12/02/2014    robotic right partial nephrectomy w dr garvey    OTHER SURGICAL HISTORY      joint surgery    SALIVARY GLAND SURGERY      TONSILLECTOMY      TOTAL KNEE ARTHROPLASTY Right     UPPER GASTROINTESTINAL ENDOSCOPY  12/22/2020    Dr. Monique    WRIST SURGERY Left      Family History   Problem Relation Age of Onset    Arthritis Mother     Arthritis Father     Cancer Father     Arthritis Sister     Arthritis Brother     Colon cancer Neg Hx     Malig Hyperthermia Neg Hx        Home Medications:  Prior to Admission medications    Medication Sig Start Date End Date Taking? Authorizing Provider   atorvastatin (LIPITOR) 20 MG tablet Take 1 tablet by mouth Daily. 2/21/24   Romi Rubio MD   Blood Glucose Monitoring Suppl (Accu-Chek Sabrina Plus) w/Device kit 1 kit Take As Directed. To checke blood sugar up to 3 times a day. DX E11.9 7/8/21   Romi Rubio MD   busPIRone (BUSPAR) 5 MG tablet Take 1 tablet by mouth 3 (Three) Times a Day. 8/8/24   Tamara Singer PA-C   clonazePAM (KlonoPIN) 1 MG tablet Take 1 tablet by mouth 2 (Two) Times a Day As Needed for Anxiety. 12/11/23   Rosa Jacobs MD   dapagliflozin Propanediol (Farxiga) 10 MG tablet TAKE 10 MG BY MOUTH DAILY. 4/29/24   Tamara Singer PA-C   diclofenac (VOLTAREN) 75 MG EC tablet TAKE 1 TABLET BY MOUTH TWICE A DAY 6/26/24   Mustapha Alfaro MD   Diclofenac Sodium (VOLTAREN) 1 % gel gel Apply 4 g topically to the appropriate area as directed 3 (Three)  Times a Day As Needed. 22   Adeola Burch MD   dicyclomine (BENTYL) 20 MG tablet Take 1 po before meals and at bedtimes as needed for cramping 9/3/24   Chloe Euceda APRN   escitalopram (LEXAPRO) 20 MG tablet Take 1 tablet by mouth Daily. 24   Tamara Singer PA-C   fluticasone (FLONASE) 50 MCG/ACT nasal spray USE 1-2 SPRAYS IN EACH NOSTRIL ONCE DAILY AS NEEDED 22   Romi Rubio MD   Insulin Pen Needle (Pen Needles) 32G X 4 MM misc 1 each 1 (One) Time Per Week. 10/19/22   Romi Rubio MD   levETIRAcetam (KEPPRA) 750 MG tablet TAKE 1 TABLET BY MOUTH TWICE A DAY 24   Romi Rubio MD   levocetirizine (XYZAL) 5 MG tablet TAKE 1 TABLET BY MOUTH EVERY DAY IN THE EVENING 24   Janee Arshad APRN   lidocaine (LIDODERM) 5 % Place 1 patch on the skin as directed by provider Daily.    Adeola Burch MD   lisinopril (PRINIVIL,ZESTRIL) 2.5 MG tablet Take 1 tablet by mouth Daily. 24   Tamara Singer PA-C   oxyCODONE-acetaminophen (PERCOCET) 7.5-325 MG per tablet Take 1 tablet every 8 hours by oral route as needed for 30 days. 23   Adeola Burch MD   Ozempic, 1 MG/DOSE, 4 MG/3ML solution pen-injector INJECT 1 MG UNDER THE SKIN INTO THE APPROPRIATE AREA AS DIRECTED EVERY 7 (SEVEN) DAYS. 24   Tamara Singer PA-C   pantoprazole (PROTONIX) 40 MG EC tablet Take 1 tablet by mouth Daily. 24   Tamara Singer PA-C   traZODone (DESYREL) 50 MG tablet TAKE 1 TABLET BY MOUTH EVERYDAY AT BEDTIME 24   Romi Rubio MD   vitamin D3 125 MCG (5000 UT) capsule capsule Take 1 capsule by mouth Daily.    ProviderAdeola MD        Social History:   Social History     Tobacco Use    Smoking status: Former     Current packs/day: 0.00     Average packs/day: 1 pack/day for 5.0 years (5.0 ttl pk-yrs)     Types: Cigarettes     Start date:      Quit date:      Years since quittin.6    Smokeless  "tobacco: Never    Tobacco comments:     Less than 5 years   Vaping Use    Vaping status: Never Used   Substance Use Topics    Alcohol use: Never     Comment: drinks less than 1 drink per day     Drug use: Never         Review of Systems:  Review of Systems   Constitutional:  Positive for appetite change.   Gastrointestinal:  Positive for abdominal pain, constipation and nausea. Negative for vomiting.   Genitourinary:  Negative for dysuria, frequency and urgency.   All other systems reviewed and are negative.       Physical Exam:  /57 (BP Location: Right arm, Patient Position: Lying)   Pulse 65   Temp 98.4 °F (36.9 °C) (Oral)   Resp 18   Ht 170.2 cm (67\")   Wt 101 kg (223 lb 5.2 oz)   SpO2 94%   BMI 34.98 kg/m²         Physical Exam  Vitals reviewed.   Constitutional:       Appearance: She is well-developed.   Cardiovascular:      Rate and Rhythm: Normal rate and regular rhythm.      Heart sounds: Normal heart sounds.   Abdominal:      General: Abdomen is flat.      Palpations: Abdomen is soft.      Tenderness: There is abdominal tenderness in the right lower quadrant and left lower quadrant.   Skin:     General: Skin is warm and dry.   Neurological:      Mental Status: She is alert.                    Procedures:  Procedures      Medical Decision Making:      Comorbidities that affect care:    Coronary Artery Disease, Diabetes, Hypertension    External Notes reviewed:    None      The following orders were placed and all results were independently analyzed by me:  Orders Placed This Encounter   Procedures    XR Abdomen KUB    CT Abdomen Pelvis With Contrast    [place] Draw    Comprehensive Metabolic Panel    Lipase    Urinalysis With Microscopic If Indicated (No Culture) - Urine, Clean Catch    Lactic Acid, Plasma    CBC Auto Differential    Urinalysis, Microscopic Only - Urine, Clean Catch    Ambulatory Referral to Nephrology    NPO Diet NPO Type: Strict NPO    Undress & Gown    Insert Peripheral IV    " CBC & Differential    Green Top (Gel)    Lavender Top    Gold Top - SST    Light Blue Top       Medications Given in the Emergency Department:  Medications   sodium chloride 0.9 % flush 10 mL (has no administration in time range)   sodium chloride 0.9 % bolus 1,000 mL (0 mL Intravenous Stopped 9/5/24 0041)   acetaminophen (OFIRMEV) injection 1,000 mg (1,000 mg Intravenous Given 9/4/24 2324)   iopamidol (ISOVUE-370) 76 % injection 100 mL (80 mL Intravenous Given 9/4/24 2343)   cephalexin (KEFLEX) capsule 500 mg (500 mg Oral Given 9/5/24 0100)        ED Course:    The patient was initially evaluated in the triage area where orders were placed. The patient was later dispositioned by Alyce B Seaver, APRN.      The patient was advised to stay for completion of workup which includes but is not limited to communication of labs and radiological results, reassessment and plan. The patient was advised that leaving prior to disposition by a provider could result in critical findings that are not communicated to the patient.     ED Course as of 09/05/24 0107   Wed Sep 04, 2024   2056   --- PROVIDER IN TRIAGE NOTE ---    Patient was seen and evaluated in triage by LORENZO Hughes.  Orders were written and the patient is currently awaiting disposition.   [MS]      ED Course User Index  [MS] Rosy Hernandez APRN       Labs:    Lab Results (last 24 hours)       Procedure Component Value Units Date/Time    CBC & Differential [[id number]]  (Abnormal) Collected: 09/04/24 2049    Specimen: Blood from Arm, Right Updated: 09/04/24 2104    Narrative:      The following orders were created for panel order CBC & Differential.  Procedure                               Abnormality         Status                     ---------                               -----------         ------                     CBC Auto Differential[[id number]]        Abnormal            Final result                 Please view results for these tests on  the individual orders.    Comprehensive Metabolic Panel [[id number]]  (Abnormal) Collected: 09/04/24 2049    Specimen: Blood from Arm, Right Updated: 09/04/24 2123     Glucose 149 mg/dL      BUN 17 mg/dL      Creatinine 1.19 mg/dL      Sodium 140 mmol/L      Potassium 3.8 mmol/L      Chloride 105 mmol/L      CO2 25.8 mmol/L      Calcium 9.2 mg/dL      Total Protein 7.1 g/dL      Albumin 3.8 g/dL      ALT (SGPT) 27 U/L      AST (SGOT) 24 U/L      Alkaline Phosphatase 92 U/L      Total Bilirubin 0.6 mg/dL      Globulin 3.3 gm/dL      A/G Ratio 1.2 g/dL      BUN/Creatinine Ratio 14.3     Anion Gap 9.2 mmol/L      eGFR 48.7 mL/min/1.73     Narrative:      GFR Normal >60  Chronic Kidney Disease <60  Kidney Failure <15    The GFR formula is only valid for adults with stable renal function between ages 18 and 70.    Lipase [[id number]]  (Normal) Collected: 09/04/24 2049    Specimen: Blood from Arm, Right Updated: 09/04/24 2123     Lipase 35 U/L     Lactic Acid, Plasma [[id number]]  (Normal) Collected: 09/04/24 2049    Specimen: Blood from Arm, Right Updated: 09/04/24 2122     Lactate 0.9 mmol/L     CBC Auto Differential [[id number]]  (Abnormal) Collected: 09/04/24 2049    Specimen: Blood from Arm, Right Updated: 09/04/24 2104     WBC 7.65 10*3/mm3      RBC 3.89 10*6/mm3      Hemoglobin 11.8 g/dL      Hematocrit 34.9 %      MCV 89.7 fL      MCH 30.3 pg      MCHC 33.8 g/dL      RDW 13.1 %      RDW-SD 42.1 fl      MPV 8.8 fL      Platelets 222 10*3/mm3      Neutrophil % 73.2 %      Lymphocyte % 19.2 %      Monocyte % 6.3 %      Eosinophil % 0.5 %      Basophil % 0.4 %      Immature Grans % 0.4 %      Neutrophils, Absolute 5.60 10*3/mm3      Lymphocytes, Absolute 1.47 10*3/mm3      Monocytes, Absolute 0.48 10*3/mm3      Eosinophils, Absolute 0.04 10*3/mm3      Basophils, Absolute 0.03 10*3/mm3      Immature Grans, Absolute 0.03 10*3/mm3      nRBC 0.0 /100 WBC     Urinalysis With Microscopic If Indicated (No Culture) - [[id number]]   (Abnormal) Collected: 09/04/24 2114    Specimen: Urine Updated: 09/04/24 2125     Color, UA Yellow     Appearance, UA Clear     pH, UA 6.0     Specific Gravity, UA 1.015     Glucose, UA >=1000 mg/dL (3+)     Ketones, UA Negative     Bilirubin, UA Negative     Blood, UA Negative     Protein, UA Negative     Leuk Esterase, UA Trace     Nitrite, UA Negative     Urobilinogen, UA 0.2 E.U./dL    Urinalysis, Microscopic Only - Urine, Clean Catch [[id number]]  (Abnormal) Collected: 09/04/24 2114    Specimen: Urine Updated: 09/04/24 2134     RBC, UA 0-2 /HPF      WBC, UA 3-5 /HPF      Bacteria, UA Trace /HPF      Squamous Epithelial Cells, UA 0-2 /HPF      Hyaline Casts, UA None Seen /LPF      Methodology Automated Microscopy             Imaging:    CT Abdomen Pelvis With Contrast    Result Date: 9/5/2024  CT ABDOMEN PELVIS W CONTRAST Date of Exam: 9/4/2024 11:40 PM EDT Indication: Lower abdominal pain. History of renal cell cancer. Comparison: 5/17/2023 Technique: Axial CT images were obtained of the abdomen and pelvis after the uneventful intravenous administration of iodinated contrast. Reconstructed coronal and sagittal images were also obtained. Automated exposure control and iterative construction methods were used. Findings: Pectus excavatum deformity is noted. There is a 3 mm left lower lobe nodule on image 27 that is stable since 8/10/2018 and benign. There is a 5 mm right lower lobe nodule on image 8 that is also stable from that study and benign. Lung bases are otherwise  clear. There is atherosclerotic disease with no aortic aneurysm. Gallbladder is surgically absent. No biliary obstruction. Spleen is enlarged measuring 14.4 cm in length. The pancreas and adrenal glands are normal. No liver mass identified. There are 2 small right renal cortical cysts. No follow-up is indicated. Postoperative changes are noted in the lower pole of the right kidney. There has been interval development of moderate left  hydronephrosis with no obstructing lesion identified. The ureter on the left side is normal in caliber. While this could reflect a recently passed stone, this could also potentially be secondary to UPJ stenosis. There is some delayed enhancement and excretion in the left kidney as a result. There is enhancement of the left renal urothelium and left ureter which could reflect ascending urinary tract infection. Urinary bladder is normal. Solid pelvic organs appear age-appropriate. Large bowel is within normal limits. No evidence of acute colitis. The appendix is not clearly seen, but there is no evidence of acute appendicitis. No small bowel obstruction is identified. The stomach is normal. Duodenal diverticulum noted. No adenopathy or free fluid is seen. There is degenerative disease in the lumbar spine. No suspicious osseous lesions are identified.     1. Interval development of left hydronephrosis without hydroureter. No obstructing lesion is seen. This is concerning for a UPJ stenosis although this could potentially be secondary to a recently passed stone. There is diminished enhancement in the left kidney as a result. Additionally, there is enhancement of the left ureter and left renal urothelium that could reflect ascending urinary tract infection. 2. Postoperative changes in the right kidney. 3. No acute findings in the GI tract. 4. Cholecystectomy without biliary obstruction. 5. Additional nonacute findings as above. Electronically Signed: Jason Saba MD  9/5/2024 12:34 AM EDT  Workstation ID: [id number]    XR Abdomen KUB    Result Date: 9/4/2024  XR ABDOMEN KUB Date of Exam: 9/4/2024 9:06 PM EDT Indication: lower ab pain Comparison: CT abdomen pelvis 5/17/2023 Findings: Few mildly dilated loops of small bowel in the central abdomen. Gas noted throughout colon to the level of rectum. Moderate to large volume formed colonic stool. Cholecystectomy clips. No convincing renal calcifications. Few tiny pelvic  phleboliths. Degenerative related changes noted throughout the spine. Overall moderate degenerative osteoarthritic change of the hip joints.     Impression: Nonspecific bowel gas pattern without evidence of high-grade obstruction. Few mildly dilated loops of small bowel in the central abdomen are nonspecific. Moderate to large formed colonic stool, correlate for constipation. Electronically Signed: Maverick Bowman MD  9/4/2024 9:27 PM EDT  Workstation ID: [id number]       Differential Diagnosis and Discussion:      Abdominal Pain: Based on the patient's signs and symptoms, I considered abdominal aortic aneurysm, small bowel obstruction, pancreatitis, acute cholecystitis, acute appendecitis, peptic ulcer disease, gastritis, colitis, endocrine disorders, irritable bowel syndrome and other differential diagnosis an etiology of the patient's abdominal pain.    All labs were reviewed and interpreted by me.  CT scan radiology impression was interpreted by me.    MDM  Number of Diagnoses or Management Options  Constipation, unspecified constipation type  Hydronephrosis, unspecified hydronephrosis type  Stenosis of ureteropelvic junction (UPJ)  Urinary tract infection in female  Diagnosis management comments: Based on the results of the patient's urinalysis and urinary complaints, signs, symptoms, and diagnostic testing is consistent with a urinary tract infection. Patient is resting comfortably, is alert, and is in no distress. The repeat examination is unremarkable and benign. The patient has no signs of urosepsis. The patient was started on antibiotics in the emergency department and will be discharged with antibiotics as an outpatient. The patient was counseled to return to the ER for fever >100.5, intractable pain or vomiting, or any other concerns that the may have. The patient has expressed a clear and thorough understanding and agreed to follow up as instructed.       Amount and/or Complexity of Data  Reviewed  Clinical lab tests: reviewed  Tests in the radiology section of CPT®: reviewed  Decide to obtain previous medical records or to obtain history from someone other than the patient: yes       Patient Care Considerations:    SEPSIS was considered but is NOT present in the emergency department as SIRS criteria is not present.      Consultants/Shared Management Plan:    None    Social Determinants of Health:    Patient is independent, reliable, and has access to care.       Disposition and Care Coordination:    Discharged: The patient is suitable and stable for discharge with no need for consideration of admission.    I have explained the patient´s condition, diagnoses and treatment plan based on the information available to me at this time. I have answered questions and addressed any concerns. The patient has a good  understanding of the patient´s diagnosis, condition, and treatment plan as can be expected at this point. The vital signs have been stable. The patient´s condition is stable and appropriate for discharge from the emergency department.      The patient will pursue further outpatient evaluation with the primary care physician or other designated or consulting physician as outlined in the discharge instructions. They are agreeable to this plan of care and follow-up instructions have been explained in detail. The patient has received these instructions in written format and has expressed an understanding of the discharge instructions. The patient is aware that any significant change in condition or worsening of symptoms should prompt an immediate return to this or the closest emergency department or call to 911.  I have explained discharge medications and the need for follow up with the patient/caretakers. This was also printed in the discharge instructions. Patient was discharged with the following medications and follow up:      Medication List        New Prescriptions      cephalexin 500 MG  capsule  Commonly known as: KEFLEX  Take 1 capsule by mouth 2 (Two) Times a Day for 5 days.     linaclotide 145 MCG capsule capsule  Commonly known as: LINZESS  Take 1 capsule by mouth Every Morning Before Breakfast.               Where to Get Your Medications        These medications were sent to [facility]/pharmacy #[id number] - [place], [place] - [address] - [phone]  - [phone]  [address] [place] [address]      Hours: 24-hours Phone: [phone]   cephalexin 500 MG capsule  linaclotide 145 MCG capsule capsule      [facility] NEPHROLOGY PROVIDER  [address] [facility] [address] [facility] [address] [facility] [address] [phone]        Tamara Singer, ANABELA  [address] [place] [id number] [phone]             Final diagnoses:   Urinary tract infection in female   Stenosis of ureteropelvic junction (UPJ)   Hydronephrosis, unspecified hydronephrosis type   Constipation, unspecified constipation type        ED Disposition       ED Disposition   Discharge    Condition   Stable    Comment   --               This medical record created using voice recognition software.             Seaver, Alyce B, APRN  09/05/24 0107

## 2024-09-05 NOTE — TELEPHONE ENCOUNTER
"Caller: Sowmya Hodges \"Alecia\"    Relationship to patient: Self    Best call back number: [phone]    Patient is needing: WANTING PORTER RAYMOND TO REFER HER TO NEPHROLOGIST TYRONE.   "

## 2024-09-05 NOTE — DISCHARGE INSTRUCTIONS
Follow-up with your primary care provider.  You are being referred to nephrology, they should call you in 2-3 business days to schedule an appointment however if you have not heard from them give them a call using the number provided.  Return to ER symptoms worsen or fail to improve.    You are being sent home with an antibiotic, Keflex.  You received the first dose of this antibiotic in the emergency department and do not need to take a second dose until tomorrow.  Continue take his medication twice daily for the next 5 days.  Make sure to take the full course of antibiotics prevent worsening or reinfection.    You are also being sent home with Linzess, for constipation.  Take 1 tablet daily.

## 2024-09-09 ENCOUNTER — OFFICE VISIT (OUTPATIENT)
Dept: ORTHOPEDIC SURGERY | Facility: CLINIC | Age: 74
End: 2024-09-09
Payer: MEDICARE

## 2024-09-09 VITALS
SYSTOLIC BLOOD PRESSURE: 115 MMHG | HEIGHT: 67 IN | OXYGEN SATURATION: 95 % | DIASTOLIC BLOOD PRESSURE: 76 MMHG | HEART RATE: 59 BPM | BODY MASS INDEX: 35 KG/M2 | WEIGHT: 223 LBS

## 2024-09-09 DIAGNOSIS — M75.41 IMPINGEMENT SYNDROME OF RIGHT SHOULDER: Primary | ICD-10-CM

## 2024-09-09 PROCEDURE — 3074F SYST BP LT 130 MM HG: CPT | Performed by: STUDENT IN AN ORGANIZED HEALTH CARE EDUCATION/TRAINING PROGRAM

## 2024-09-09 PROCEDURE — 20610 DRAIN/INJ JOINT/BURSA W/O US: CPT | Performed by: STUDENT IN AN ORGANIZED HEALTH CARE EDUCATION/TRAINING PROGRAM

## 2024-09-09 PROCEDURE — 3078F DIAST BP <80 MM HG: CPT | Performed by: STUDENT IN AN ORGANIZED HEALTH CARE EDUCATION/TRAINING PROGRAM

## 2024-09-09 PROCEDURE — 1159F MED LIST DOCD IN RCRD: CPT | Performed by: STUDENT IN AN ORGANIZED HEALTH CARE EDUCATION/TRAINING PROGRAM

## 2024-09-09 PROCEDURE — 1160F RVW MEDS BY RX/DR IN RCRD: CPT | Performed by: STUDENT IN AN ORGANIZED HEALTH CARE EDUCATION/TRAINING PROGRAM

## 2024-09-09 PROCEDURE — 99213 OFFICE O/P EST LOW 20 MIN: CPT | Performed by: STUDENT IN AN ORGANIZED HEALTH CARE EDUCATION/TRAINING PROGRAM

## 2024-09-09 RX ORDER — LIDOCAINE HYDROCHLORIDE 10 MG/ML
5 INJECTION, SOLUTION INFILTRATION; PERINEURAL
Status: COMPLETED | OUTPATIENT
Start: 2024-09-09 | End: 2024-09-09

## 2024-09-09 RX ORDER — TRIAMCINOLONE ACETONIDE 40 MG/ML
40 INJECTION, SUSPENSION INTRA-ARTICULAR; INTRAMUSCULAR
Status: COMPLETED | OUTPATIENT
Start: 2024-09-09 | End: 2024-09-09

## 2024-09-09 RX ADMIN — TRIAMCINOLONE ACETONIDE 40 MG: 40 INJECTION, SUSPENSION INTRA-ARTICULAR; INTRAMUSCULAR at 08:22

## 2024-09-09 RX ADMIN — LIDOCAINE HYDROCHLORIDE 5 ML: 10 INJECTION, SOLUTION INFILTRATION; PERINEURAL at 08:22

## 2024-09-09 NOTE — TELEPHONE ENCOUNTER
Pt verbalized understanding that  is not in [facility]. She will keep referral as is and see a  provider.

## 2024-09-09 NOTE — PROGRESS NOTES
"Chief Complaint  Pain and Initial Evaluation of the Right Shoulder    Subjective          Sowmya Hodges presents to [facility] for an evaluation of her right shoulder.     History of Present Illness    The patient presents here today for an evaluation of her right shoulder. She fell and landed on her shoulder early last month. She went to her family doctor where she had x-rays taken of her right shoulder. She has pain with over head motion, reaching and pulling on her right shoulder. She denies any prior surgery on her right shoulder.     Allergies   Allergen Reactions    Codeine Rash               Social History     Socioeconomic History    Marital status: Single   Tobacco Use    Smoking status: Former     Current packs/day: 0.00     Average packs/day: 1 pack/day for 5.0 years (5.0 ttl pk-yrs)     Types: Cigarettes     Start date:      Quit date:      Years since quittin.    Smokeless tobacco: Never    Tobacco comments:     Less than 5 years   Vaping Use    Vaping status: Never Used   Substance and Sexual Activity    Alcohol use: Never     Comment: drinks less than 1 drink per day     Drug use: Never    Sexual activity: Not Currently     Partners: Male     Birth control/protection: None        I reviewed the patient's chief complaint, history of present illness, review of systems, past medical history, surgical history, family history, social history, medications, and allergy list.     REVIEW OF SYSTEMS    Constitutional: Denies fevers, chills, weight loss  Cardiovascular: Denies chest pain, shortness of breath  Skin: Denies rashes, acute skin changes  Neurologic: Denies headache, loss of consciousness  MSK: Right shoulder pain       Objective   Vital Signs:   /76   Pulse 59   Ht 170.2 cm (67\")   Wt 101 kg (223 lb)   SpO2 95%   BMI 34.93 kg/m²     Body mass index is 34.93 kg/m².    Physical Exam    General: Alert. No acute distress.   Right upper extremity: " forward elevation to 180 degrees with pain, external rotation  to 45 degrees, internal rotation to mid lumbar, 5/5 supraspinatus strength, 5/5 infraspinatus, 5/5 subscap, positive  impingement, mild pain with Speed's, mild pain with Chester, neurovascularly intact, sensation intact to the medial , radial and ulnar nerve      Large Joint Arthrocentesis  Date/Time: 9/9/2024 8:22 AM  Consent given by: patient  Site marked: site marked  Timeout: Immediately prior to procedure a time out was called to verify the correct patient, procedure, equipment, support staff and site/side marked as required   Supporting Documentation  Indications: pain   Procedure Details  Location: shoulder (RIGHT) -   Needle gauge: 21 G.  Medications administered: 5 mL lidocaine 1 %; 40 mg triamcinolone acetonide 40 MG/ML  Patient tolerance: patient tolerated the procedure well with no immediate complications    This injection documentation was Scribed for Mustapha Alfaro MD by Kenia Castillo.  09/09/24   08:23 EDT    Imaging Results (Most Recent)       None                     Assessment and Plan        CT Abdomen Pelvis With Contrast    Result Date: 9/5/2024  Narrative: CT ABDOMEN PELVIS W CONTRAST Date of Exam: 9/4/2024 11:40 PM EDT Indication: Lower abdominal pain. History of renal cell cancer. Comparison: 5/17/2023 Technique: Axial CT images were obtained of the abdomen and pelvis after the uneventful intravenous administration of iodinated contrast. Reconstructed coronal and sagittal images were also obtained. Automated exposure control and iterative construction methods were used. Findings: Pectus excavatum deformity is noted. There is a 3 mm left lower lobe nodule on image 27 that is stable since 8/10/2018 and benign. There is a 5 mm right lower lobe nodule on image 8 that is also stable from that study and benign. Lung bases are otherwise  clear. There is atherosclerotic disease with no aortic aneurysm. Gallbladder is surgically absent. No  biliary obstruction. Spleen is enlarged measuring 14.4 cm in length. The pancreas and adrenal glands are normal. No liver mass identified. There are 2 small right renal cortical cysts. No follow-up is indicated. Postoperative changes are noted in the lower pole of the right kidney. There has been interval development of moderate left hydronephrosis with no obstructing lesion identified. The ureter on the left side is normal in caliber. While this could reflect a recently passed stone, this could also potentially be secondary to UPJ stenosis. There is some delayed enhancement and excretion in the left kidney as a result. There is enhancement of the left renal urothelium and left ureter which could reflect ascending urinary tract infection. Urinary bladder is normal. Solid pelvic organs appear age-appropriate. Large bowel is within normal limits. No evidence of acute colitis. The appendix is not clearly seen, but there is no evidence of acute appendicitis. No small bowel obstruction is identified. The stomach is normal. Duodenal diverticulum noted. No adenopathy or free fluid is seen. There is degenerative disease in the lumbar spine. No suspicious osseous lesions are identified.     Impression: 1. Interval development of left hydronephrosis without hydroureter. No obstructing lesion is seen. This is concerning for a UPJ stenosis although this could potentially be secondary to a recently passed stone. There is diminished enhancement in the left kidney as a result. Additionally, there is enhancement of the left ureter and left renal urothelium that could reflect ascending urinary tract infection. 2. Postoperative changes in the right kidney. 3. No acute findings in the GI tract. 4. Cholecystectomy without biliary obstruction. 5. Additional nonacute findings as above. Electronically Signed: Jason Saba MD  9/5/2024 12:34 AM EDT  Workstation ID: [id number]    XR Abdomen KUB    Result Date: 9/4/2024  Narrative: XR  ABDOMEN KUB Date of Exam: 9/4/2024 9:06 PM EDT Indication: lower ab pain Comparison: CT abdomen pelvis 5/17/2023 Findings: Few mildly dilated loops of small bowel in the central abdomen. Gas noted throughout colon to the level of rectum. Moderate to large volume formed colonic stool. Cholecystectomy clips. No convincing renal calcifications. Few tiny pelvic phleboliths. Degenerative related changes noted throughout the spine. Overall moderate degenerative osteoarthritic change of the hip joints.     Impression: Impression: Nonspecific bowel gas pattern without evidence of high-grade obstruction. Few mildly dilated loops of small bowel in the central abdomen are nonspecific. Moderate to large formed colonic stool, correlate for constipation. Electronically Signed: Maverick Bowman MD  9/4/2024 9:27 PM EDT  Workstation ID: [id number]    XR Shoulder 2+ View Right (In Office)    Result Date: 8/12/2024  Narrative: XR SHOULDER 2+ VW RIGHT Date of Exam: 8/12/2024 9:46 AM EDT Indication: pain Comparison: None available. Findings: There are degenerative change involving the AC joint. There are also degenerative change involving the humeral head in the area of the greater tuberosity. No acute osseous abnormality is definitely identified. The visualized right hemithorax seems clear.     Impression: Impression: 1.There are degenerative changes about the shoulder. Electronically Signed: Paddy Ang MD  8/12/2024 9:59 AM EDT  Workstation ID: [id number]      Diagnoses and all orders for this visit:    1. Impingement syndrome of right shoulder (Primary)        The patient presents here today for an evaluation  of her right shoulder.     Discussed the risks and benefits of her right shoulder steroid injection today in the office. Patient expressed understanding and wishes to proceed. She tolerated the injection well and without any complications.     Home exercises given today and will continue over the counter medications for pain  control.      Call or return if worsening symptoms.    Scribed for Mustapha Alfaro MD by Kimberli Theodore  09/09/2024   08:11 EDT         Follow Up       PRN     Patient was given instructions and counseling regarding her condition or for health maintenance advice. Please see specific information pulled into the AVS if appropriate.       I have personally performed the services described in this document as scribed by the above individual and it is both accurate and complete. Mustapha Alfaro MD 09/09/24 08:34 EDT

## 2024-09-12 ENCOUNTER — TELEPHONE (OUTPATIENT)
Dept: GASTROENTEROLOGY | Facility: CLINIC | Age: 74
End: 2024-09-12
Payer: MEDICARE

## 2024-09-12 NOTE — TELEPHONE ENCOUNTER
"         Hub staff attempted to follow warm transfer process and was unsuccessful     Caller: Sowmya Hodges \"Alecia\"    Relationship to patient: Self    Best call back number:  [phone]    Patient is needing: PT HAS VOMITED 3 TIMES ABDOMINAL PAIN GOING ON A FEW DAYS. PT HAS GONE TO THE BATHROOM BUT IS AFRAID SHE IS STILL CONSTIPATED. PAIN IS ON LEFT SIDE. PLEASE CALL AND ADVISE.            "

## 2024-09-13 NOTE — TELEPHONE ENCOUNTER
Spoke with patient, her vomiting and abd pain started yesterday. Stated she vomited 3x yesterday and pain was on her left side. Today, she feels better no to little abd pain, no nausea or vomiting. Advised patient to go to the ER if her pain gets worse and vomiting more .Her bowel movements are okay now. patient aware we will call her Monday

## 2024-09-16 ENCOUNTER — OFFICE VISIT (OUTPATIENT)
Dept: GASTROENTEROLOGY | Facility: CLINIC | Age: 74
End: 2024-09-16
Payer: MEDICARE

## 2024-09-16 VITALS
HEART RATE: 76 BPM | DIASTOLIC BLOOD PRESSURE: 51 MMHG | SYSTOLIC BLOOD PRESSURE: 96 MMHG | BODY MASS INDEX: 34.88 KG/M2 | WEIGHT: 222.2 LBS | HEIGHT: 67 IN

## 2024-09-16 DIAGNOSIS — K74.69 OTHER CIRRHOSIS OF LIVER: ICD-10-CM

## 2024-09-16 DIAGNOSIS — R10.30 LOWER ABDOMINAL PAIN: Primary | ICD-10-CM

## 2024-09-16 DIAGNOSIS — K58.1 IRRITABLE BOWEL SYNDROME WITH CONSTIPATION: ICD-10-CM

## 2024-09-16 PROCEDURE — 1160F RVW MEDS BY RX/DR IN RCRD: CPT | Performed by: NURSE PRACTITIONER

## 2024-09-16 PROCEDURE — 3074F SYST BP LT 130 MM HG: CPT | Performed by: NURSE PRACTITIONER

## 2024-09-16 PROCEDURE — 1159F MED LIST DOCD IN RCRD: CPT | Performed by: NURSE PRACTITIONER

## 2024-09-16 PROCEDURE — 3078F DIAST BP <80 MM HG: CPT | Performed by: NURSE PRACTITIONER

## 2024-09-16 PROCEDURE — 99214 OFFICE O/P EST MOD 30 MIN: CPT | Performed by: NURSE PRACTITIONER

## 2024-09-16 RX ORDER — TENAPANOR HYDROCHLORIDE 53.2 MG/1
50 TABLET ORAL 2 TIMES DAILY
Qty: 60 TABLET | Refills: 3 | Status: SHIPPED | OUTPATIENT
Start: 2024-09-16

## 2024-09-18 ENCOUNTER — HOSPITAL ENCOUNTER (EMERGENCY)
Facility: HOSPITAL | Age: 74
Discharge: HOME | End: 2024-09-18
Attending: EMERGENCY MEDICINE
Payer: MEDICARE

## 2024-09-18 ENCOUNTER — TELEPHONE (OUTPATIENT)
Dept: UROLOGY | Facility: CLINIC | Age: 74
End: 2024-09-18
Payer: MEDICARE

## 2024-09-18 ENCOUNTER — APPOINTMENT (OUTPATIENT)
Dept: CT IMAGING | Facility: HOSPITAL | Age: 74
End: 2024-09-18
Payer: MEDICARE

## 2024-09-18 ENCOUNTER — PREP FOR SURGERY (OUTPATIENT)
Dept: OTHER | Facility: HOSPITAL | Age: 74
End: 2024-09-18
Payer: MEDICARE

## 2024-09-18 VITALS
OXYGEN SATURATION: 100 % | DIASTOLIC BLOOD PRESSURE: 71 MMHG | WEIGHT: 223.77 LBS | TEMPERATURE: 98.2 F | HEART RATE: 71 BPM | RESPIRATION RATE: 20 BRPM | SYSTOLIC BLOOD PRESSURE: 147 MMHG | HEIGHT: 67 IN | BODY MASS INDEX: 35.12 KG/M2

## 2024-09-18 DIAGNOSIS — N13.30 HYDRONEPHROSIS OF LEFT KIDNEY: Primary | ICD-10-CM

## 2024-09-18 DIAGNOSIS — N13.30 HYDRONEPHROSIS, UNSPECIFIED HYDRONEPHROSIS TYPE: ICD-10-CM

## 2024-09-18 DIAGNOSIS — Q62.11 STENOSIS OF URETEROPELVIC JUNCTION (UPJ): Primary | ICD-10-CM

## 2024-09-18 LAB
ALBUMIN SERPL-MCNC: 3.4 G/DL (ref 3.5–5.2)
ALBUMIN/GLOB SERPL: 1.3 G/DL
ALP SERPL-CCNC: 80 U/L (ref 39–117)
ALT SERPL W P-5'-P-CCNC: 20 U/L (ref 1–33)
ANION GAP SERPL CALCULATED.3IONS-SCNC: 13.9 MMOL/L (ref 5–15)
AST SERPL-CCNC: 21 U/L (ref 1–32)
BASOPHILS # BLD AUTO: 0.02 10*3/MM3 (ref 0–0.2)
BASOPHILS NFR BLD AUTO: 0.3 % (ref 0–1.5)
BILIRUB SERPL-MCNC: 0.7 MG/DL (ref 0–1.2)
BILIRUB UR QL STRIP: NEGATIVE
BUN SERPL-MCNC: 14 MG/DL (ref 8–23)
BUN/CREAT SERPL: 12.6 (ref 7–25)
CALCIUM SPEC-SCNC: 8.8 MG/DL (ref 8.6–10.5)
CHLORIDE SERPL-SCNC: 103 MMOL/L (ref 98–107)
CLARITY UR: CLEAR
CO2 SERPL-SCNC: 21.1 MMOL/L (ref 22–29)
COLOR UR: YELLOW
CREAT SERPL-MCNC: 1.11 MG/DL (ref 0.57–1)
D-LACTATE SERPL-SCNC: 2.7 MMOL/L (ref 0.5–2)
DEPRECATED RDW RBC AUTO: 41.3 FL (ref 37–54)
EGFRCR SERPLBLD CKD-EPI 2021: 52.9 ML/MIN/1.73
EOSINOPHIL # BLD AUTO: 0.01 10*3/MM3 (ref 0–0.4)
EOSINOPHIL NFR BLD AUTO: 0.1 % (ref 0.3–6.2)
ERYTHROCYTE [DISTWIDTH] IN BLOOD BY AUTOMATED COUNT: 13.1 % (ref 12.3–15.4)
GLOBULIN UR ELPH-MCNC: 2.7 GM/DL
GLUCOSE SERPL-MCNC: 155 MG/DL (ref 65–99)
GLUCOSE UR STRIP-MCNC: ABNORMAL MG/DL
HCT VFR BLD AUTO: 35.3 % (ref 34–46.6)
HGB BLD-MCNC: 12.1 G/DL (ref 12–15.9)
HGB UR QL STRIP.AUTO: NEGATIVE
HOLD SPECIMEN: NORMAL
HOLD SPECIMEN: NORMAL
IMM GRANULOCYTES # BLD AUTO: 0.03 10*3/MM3 (ref 0–0.05)
IMM GRANULOCYTES NFR BLD AUTO: 0.4 % (ref 0–0.5)
KETONES UR QL STRIP: ABNORMAL
LEUKOCYTE ESTERASE UR QL STRIP.AUTO: NEGATIVE
LIPASE SERPL-CCNC: 28 U/L (ref 13–60)
LYMPHOCYTES # BLD AUTO: 1.5 10*3/MM3 (ref 0.7–3.1)
LYMPHOCYTES NFR BLD AUTO: 20.6 % (ref 19.6–45.3)
MCH RBC QN AUTO: 30 PG (ref 26.6–33)
MCHC RBC AUTO-ENTMCNC: 34.3 G/DL (ref 31.5–35.7)
MCV RBC AUTO: 87.6 FL (ref 79–97)
MONOCYTES # BLD AUTO: 0.53 10*3/MM3 (ref 0.1–0.9)
MONOCYTES NFR BLD AUTO: 7.3 % (ref 5–12)
NEUTROPHILS NFR BLD AUTO: 5.19 10*3/MM3 (ref 1.7–7)
NEUTROPHILS NFR BLD AUTO: 71.3 % (ref 42.7–76)
NITRITE UR QL STRIP: NEGATIVE
NRBC BLD AUTO-RTO: 0 /100 WBC (ref 0–0.2)
PH UR STRIP.AUTO: 7.5 [PH] (ref 5–8)
PLATELET # BLD AUTO: 187 10*3/MM3 (ref 140–450)
PMV BLD AUTO: 8.5 FL (ref 6–12)
POTASSIUM SERPL-SCNC: 4 MMOL/L (ref 3.5–5.2)
PROT SERPL-MCNC: 6.1 G/DL (ref 6–8.5)
PROT UR QL STRIP: NEGATIVE
RBC # BLD AUTO: 4.03 10*6/MM3 (ref 3.77–5.28)
SODIUM SERPL-SCNC: 138 MMOL/L (ref 136–145)
SP GR UR STRIP: 1.03 (ref 1–1.03)
UROBILINOGEN UR QL STRIP: ABNORMAL
WBC NRBC COR # BLD AUTO: 7.28 10*3/MM3 (ref 3.4–10.8)
WHOLE BLOOD HOLD COAG: NORMAL
WHOLE BLOOD HOLD SPECIMEN: NORMAL

## 2024-09-18 PROCEDURE — 96375 TX/PRO/DX INJ NEW DRUG ADDON: CPT

## 2024-09-18 PROCEDURE — 74177 CT ABD & PELVIS W/CONTRAST: CPT

## 2024-09-18 PROCEDURE — 36415 COLL VENOUS BLD VENIPUNCTURE: CPT

## 2024-09-18 PROCEDURE — 81003 URINALYSIS AUTO W/O SCOPE: CPT | Performed by: EMERGENCY MEDICINE

## 2024-09-18 PROCEDURE — 25510000001 IOPAMIDOL PER 1 ML: Performed by: EMERGENCY MEDICINE

## 2024-09-18 PROCEDURE — 83690 ASSAY OF LIPASE: CPT | Performed by: EMERGENCY MEDICINE

## 2024-09-18 PROCEDURE — 80053 COMPREHEN METABOLIC PANEL: CPT | Performed by: EMERGENCY MEDICINE

## 2024-09-18 PROCEDURE — 99285 EMERGENCY DEPT VISIT HI MDM: CPT

## 2024-09-18 PROCEDURE — 25010000002 ONDANSETRON PER 1 MG: Performed by: EMERGENCY MEDICINE

## 2024-09-18 PROCEDURE — 25010000002 HYDROMORPHONE 1 MG/ML SOLUTION: Performed by: EMERGENCY MEDICINE

## 2024-09-18 PROCEDURE — 25810000003 SODIUM CHLORIDE 0.9 % SOLUTION: Performed by: EMERGENCY MEDICINE

## 2024-09-18 PROCEDURE — 96374 THER/PROPH/DIAG INJ IV PUSH: CPT

## 2024-09-18 PROCEDURE — 85025 COMPLETE CBC W/AUTO DIFF WBC: CPT | Performed by: EMERGENCY MEDICINE

## 2024-09-18 PROCEDURE — 83605 ASSAY OF LACTIC ACID: CPT | Performed by: EMERGENCY MEDICINE

## 2024-09-18 RX ORDER — ONDANSETRON 2 MG/ML
4 INJECTION INTRAMUSCULAR; INTRAVENOUS ONCE
Status: COMPLETED | OUTPATIENT
Start: 2024-09-18 | End: 2024-09-18

## 2024-09-18 RX ORDER — IOPAMIDOL 755 MG/ML
100 INJECTION, SOLUTION INTRAVASCULAR
Status: COMPLETED | OUTPATIENT
Start: 2024-09-18 | End: 2024-09-18

## 2024-09-18 RX ORDER — SODIUM CHLORIDE 9 MG/ML
100 INJECTION, SOLUTION INTRAVENOUS CONTINUOUS
Status: CANCELLED | OUTPATIENT
Start: 2024-09-18

## 2024-09-18 RX ORDER — ONDANSETRON 4 MG/1
4 TABLET, ORALLY DISINTEGRATING ORAL EVERY 8 HOURS PRN
Qty: 12 TABLET | Refills: 0 | Status: SHIPPED | OUTPATIENT
Start: 2024-09-18

## 2024-09-18 RX ORDER — SODIUM CHLORIDE 0.9 % (FLUSH) 0.9 %
10 SYRINGE (ML) INJECTION EVERY 12 HOURS SCHEDULED
Status: CANCELLED | OUTPATIENT
Start: 2024-09-18

## 2024-09-18 RX ORDER — SODIUM CHLORIDE 9 MG/ML
40 INJECTION, SOLUTION INTRAVENOUS AS NEEDED
Status: CANCELLED | OUTPATIENT
Start: 2024-09-18

## 2024-09-18 RX ORDER — SODIUM CHLORIDE 0.9 % (FLUSH) 0.9 %
10 SYRINGE (ML) INJECTION AS NEEDED
Status: CANCELLED | OUTPATIENT
Start: 2024-09-18

## 2024-09-18 RX ORDER — SODIUM CHLORIDE 0.9 % (FLUSH) 0.9 %
10 SYRINGE (ML) INJECTION AS NEEDED
Status: DISCONTINUED | OUTPATIENT
Start: 2024-09-18 | End: 2024-09-18 | Stop reason: HOSPADM

## 2024-09-18 RX ADMIN — SODIUM CHLORIDE 1000 ML: 9 INJECTION, SOLUTION INTRAVENOUS at 09:19

## 2024-09-18 RX ADMIN — ONDANSETRON 4 MG: 2 INJECTION INTRAMUSCULAR; INTRAVENOUS at 09:20

## 2024-09-18 RX ADMIN — HYDROMORPHONE HYDROCHLORIDE 1 MG: 1 INJECTION, SOLUTION INTRAMUSCULAR; INTRAVENOUS; SUBCUTANEOUS at 09:22

## 2024-09-18 RX ADMIN — IOPAMIDOL 100 ML: 755 INJECTION, SOLUTION INTRAVENOUS at 10:04

## 2024-09-19 ENCOUNTER — ANESTHESIA (OUTPATIENT)
Dept: PERIOP | Facility: HOSPITAL | Age: 74
End: 2024-09-19
Payer: MEDICARE

## 2024-09-19 ENCOUNTER — ANESTHESIA EVENT (OUTPATIENT)
Dept: PERIOP | Facility: HOSPITAL | Age: 74
End: 2024-09-19
Payer: MEDICARE

## 2024-09-19 ENCOUNTER — APPOINTMENT (OUTPATIENT)
Dept: GENERAL RADIOLOGY | Facility: HOSPITAL | Age: 74
End: 2024-09-19
Payer: MEDICARE

## 2024-09-19 ENCOUNTER — HOSPITAL ENCOUNTER (OUTPATIENT)
Facility: HOSPITAL | Age: 74
Setting detail: HOSPITAL OUTPATIENT SURGERY
Discharge: HOME | End: 2024-09-19
Attending: UROLOGY | Admitting: UROLOGY
Payer: MEDICARE

## 2024-09-19 VITALS
OXYGEN SATURATION: 91 % | HEIGHT: 67 IN | BODY MASS INDEX: 34.43 KG/M2 | SYSTOLIC BLOOD PRESSURE: 123 MMHG | WEIGHT: 219.36 LBS | HEART RATE: 66 BPM | TEMPERATURE: 98.1 F | RESPIRATION RATE: 16 BRPM | DIASTOLIC BLOOD PRESSURE: 51 MMHG

## 2024-09-19 DIAGNOSIS — Q62.11 STENOSIS OF URETEROPELVIC JUNCTION (UPJ): ICD-10-CM

## 2024-09-19 DIAGNOSIS — N13.30 HYDRONEPHROSIS, UNSPECIFIED HYDRONEPHROSIS TYPE: ICD-10-CM

## 2024-09-19 LAB
GLUCOSE BLDC GLUCOMTR-MCNC: 102 MG/DL (ref 70–99)
GLUCOSE BLDC GLUCOMTR-MCNC: 98 MG/DL (ref 70–99)

## 2024-09-19 PROCEDURE — 25010000002 PROPOFOL 10 MG/ML EMULSION: Performed by: NURSE ANESTHETIST, CERTIFIED REGISTERED

## 2024-09-19 PROCEDURE — 25010000002 CEFAZOLIN PER 500 MG: Performed by: NURSE ANESTHETIST, CERTIFIED REGISTERED

## 2024-09-19 PROCEDURE — 52332 CYSTOSCOPY AND TREATMENT: CPT | Performed by: UROLOGY

## 2024-09-19 PROCEDURE — 52351 CYSTOURETERO & OR PYELOSCOPE: CPT | Performed by: UROLOGY

## 2024-09-19 PROCEDURE — C1769 GUIDE WIRE: HCPCS | Performed by: UROLOGY

## 2024-09-19 PROCEDURE — C1758 CATHETER, URETERAL: HCPCS | Performed by: UROLOGY

## 2024-09-19 PROCEDURE — C2617 STENT, NON-COR, TEM W/O DEL: HCPCS | Performed by: UROLOGY

## 2024-09-19 PROCEDURE — 25010000002 METOCLOPRAMIDE PER 10 MG: Performed by: ANESTHESIOLOGY

## 2024-09-19 PROCEDURE — 25010000002 DEXAMETHASONE PER 1 MG: Performed by: NURSE ANESTHETIST, CERTIFIED REGISTERED

## 2024-09-19 PROCEDURE — 82948 REAGENT STRIP/BLOOD GLUCOSE: CPT

## 2024-09-19 PROCEDURE — 25010000002 FENTANYL CITRATE (PF) 50 MCG/ML SOLUTION: Performed by: NURSE ANESTHETIST, CERTIFIED REGISTERED

## 2024-09-19 PROCEDURE — 25810000003 LACTATED RINGERS PER 1000 ML: Performed by: ANESTHESIOLOGY

## 2024-09-19 PROCEDURE — 25010000002 MIDAZOLAM PER 1MG: Performed by: ANESTHESIOLOGY

## 2024-09-19 PROCEDURE — 82948 REAGENT STRIP/BLOOD GLUCOSE: CPT | Performed by: NURSE ANESTHETIST, CERTIFIED REGISTERED

## 2024-09-19 PROCEDURE — 25010000002 SUGAMMADEX 200 MG/2ML SOLUTION

## 2024-09-19 PROCEDURE — 76000 FLUOROSCOPY <1 HR PHYS/QHP: CPT

## 2024-09-19 PROCEDURE — 74420 UROGRAPHY RTRGR +-KUB: CPT | Performed by: UROLOGY

## 2024-09-19 PROCEDURE — 25010000002 ONDANSETRON PER 1 MG: Performed by: NURSE ANESTHETIST, CERTIFIED REGISTERED

## 2024-09-19 PROCEDURE — 25510000001 IOPAMIDOL PER 1 ML: Performed by: UROLOGY

## 2024-09-19 DEVICE — IMPLANTABLE DEVICE: Type: IMPLANTABLE DEVICE | Site: URETER | Status: FUNCTIONAL

## 2024-09-19 RX ORDER — PROMETHAZINE HYDROCHLORIDE 12.5 MG/1
25 TABLET ORAL ONCE AS NEEDED
Status: DISCONTINUED | OUTPATIENT
Start: 2024-09-19 | End: 2024-09-19 | Stop reason: HOSPADM

## 2024-09-19 RX ORDER — IBUPROFEN 600 MG/1
600 TABLET, FILM COATED ORAL EVERY 6 HOURS PRN
Status: DISCONTINUED | OUTPATIENT
Start: 2024-09-19 | End: 2024-09-19 | Stop reason: HOSPADM

## 2024-09-19 RX ORDER — ROCURONIUM BROMIDE 10 MG/ML
INJECTION, SOLUTION INTRAVENOUS AS NEEDED
Status: DISCONTINUED | OUTPATIENT
Start: 2024-09-19 | End: 2024-09-19 | Stop reason: SURG

## 2024-09-19 RX ORDER — ACETAMINOPHEN 500 MG
1000 TABLET ORAL ONCE
Status: COMPLETED | OUTPATIENT
Start: 2024-09-19 | End: 2024-09-19

## 2024-09-19 RX ORDER — DEXAMETHASONE SODIUM PHOSPHATE 4 MG/ML
INJECTION, SOLUTION INTRA-ARTICULAR; INTRALESIONAL; INTRAMUSCULAR; INTRAVENOUS; SOFT TISSUE AS NEEDED
Status: DISCONTINUED | OUTPATIENT
Start: 2024-09-19 | End: 2024-09-19 | Stop reason: SURG

## 2024-09-19 RX ORDER — METOCLOPRAMIDE HYDROCHLORIDE 5 MG/ML
10 INJECTION INTRAMUSCULAR; INTRAVENOUS EVERY 6 HOURS
Status: DISCONTINUED | OUTPATIENT
Start: 2024-09-19 | End: 2024-09-19 | Stop reason: HOSPADM

## 2024-09-19 RX ORDER — SODIUM CHLORIDE 0.9 % (FLUSH) 0.9 %
10 SYRINGE (ML) INJECTION EVERY 12 HOURS SCHEDULED
Status: DISCONTINUED | OUTPATIENT
Start: 2024-09-19 | End: 2024-09-19 | Stop reason: HOSPADM

## 2024-09-19 RX ORDER — SODIUM CHLORIDE 9 MG/ML
100 INJECTION, SOLUTION INTRAVENOUS CONTINUOUS
Status: DISCONTINUED | OUTPATIENT
Start: 2024-09-19 | End: 2024-09-19 | Stop reason: HOSPADM

## 2024-09-19 RX ORDER — PROMETHAZINE HYDROCHLORIDE 12.5 MG/1
12.5 TABLET ORAL ONCE AS NEEDED
Status: DISCONTINUED | OUTPATIENT
Start: 2024-09-19 | End: 2024-09-19 | Stop reason: HOSPADM

## 2024-09-19 RX ORDER — ACETAMINOPHEN 325 MG/1
650 TABLET ORAL ONCE
Status: DISCONTINUED | OUTPATIENT
Start: 2024-09-19 | End: 2024-09-19 | Stop reason: HOSPADM

## 2024-09-19 RX ORDER — PROPOFOL 10 MG/ML
VIAL (ML) INTRAVENOUS AS NEEDED
Status: DISCONTINUED | OUTPATIENT
Start: 2024-09-19 | End: 2024-09-19 | Stop reason: SURG

## 2024-09-19 RX ORDER — SUCCINYLCHOLINE/SOD CL,ISO/PF 100 MG/5ML
SYRINGE (ML) INTRAVENOUS AS NEEDED
Status: DISCONTINUED | OUTPATIENT
Start: 2024-09-19 | End: 2024-09-19 | Stop reason: SURG

## 2024-09-19 RX ORDER — OXYCODONE HYDROCHLORIDE 5 MG/1
5 TABLET ORAL
Status: DISCONTINUED | OUTPATIENT
Start: 2024-09-19 | End: 2024-09-19 | Stop reason: HOSPADM

## 2024-09-19 RX ORDER — PROMETHAZINE HYDROCHLORIDE 25 MG/1
25 SUPPOSITORY RECTAL ONCE AS NEEDED
Status: DISCONTINUED | OUTPATIENT
Start: 2024-09-19 | End: 2024-09-19 | Stop reason: HOSPADM

## 2024-09-19 RX ORDER — MIDAZOLAM HYDROCHLORIDE 2 MG/2ML
2 INJECTION, SOLUTION INTRAMUSCULAR; INTRAVENOUS ONCE
Status: COMPLETED | OUTPATIENT
Start: 2024-09-19 | End: 2024-09-19

## 2024-09-19 RX ORDER — IOPAMIDOL 510 MG/ML
INJECTION, SOLUTION INTRAVASCULAR AS NEEDED
Status: DISCONTINUED | OUTPATIENT
Start: 2024-09-19 | End: 2024-09-19 | Stop reason: HOSPADM

## 2024-09-19 RX ORDER — CITRIC ACID/SODIUM CITRATE 334-500MG
30 SOLUTION, ORAL ORAL ONCE
Status: COMPLETED | OUTPATIENT
Start: 2024-09-19 | End: 2024-09-19

## 2024-09-19 RX ORDER — SODIUM CHLORIDE, SODIUM LACTATE, POTASSIUM CHLORIDE, CALCIUM CHLORIDE 600; 310; 30; 20 MG/100ML; MG/100ML; MG/100ML; MG/100ML
9 INJECTION, SOLUTION INTRAVENOUS CONTINUOUS PRN
Status: DISCONTINUED | OUTPATIENT
Start: 2024-09-19 | End: 2024-09-19 | Stop reason: HOSPADM

## 2024-09-19 RX ORDER — MAGNESIUM HYDROXIDE 1200 MG/15ML
LIQUID ORAL AS NEEDED
Status: DISCONTINUED | OUTPATIENT
Start: 2024-09-19 | End: 2024-09-19 | Stop reason: HOSPADM

## 2024-09-19 RX ORDER — LIDOCAINE HYDROCHLORIDE 20 MG/ML
INJECTION, SOLUTION EPIDURAL; INFILTRATION; INTRACAUDAL; PERINEURAL AS NEEDED
Status: DISCONTINUED | OUTPATIENT
Start: 2024-09-19 | End: 2024-09-19 | Stop reason: SURG

## 2024-09-19 RX ORDER — SODIUM CHLORIDE 0.9 % (FLUSH) 0.9 %
10 SYRINGE (ML) INJECTION AS NEEDED
Status: DISCONTINUED | OUTPATIENT
Start: 2024-09-19 | End: 2024-09-19 | Stop reason: HOSPADM

## 2024-09-19 RX ORDER — CEFAZOLIN SODIUM 1 G/3ML
INJECTION, POWDER, FOR SOLUTION INTRAMUSCULAR; INTRAVENOUS AS NEEDED
Status: DISCONTINUED | OUTPATIENT
Start: 2024-09-19 | End: 2024-09-19 | Stop reason: SURG

## 2024-09-19 RX ORDER — FAMOTIDINE 10 MG/ML
20 INJECTION, SOLUTION INTRAVENOUS EVERY 12 HOURS SCHEDULED
Status: DISCONTINUED | OUTPATIENT
Start: 2024-09-19 | End: 2024-09-19 | Stop reason: HOSPADM

## 2024-09-19 RX ORDER — ONDANSETRON 2 MG/ML
INJECTION INTRAMUSCULAR; INTRAVENOUS AS NEEDED
Status: DISCONTINUED | OUTPATIENT
Start: 2024-09-19 | End: 2024-09-19 | Stop reason: SURG

## 2024-09-19 RX ORDER — ONDANSETRON 2 MG/ML
4 INJECTION INTRAMUSCULAR; INTRAVENOUS ONCE AS NEEDED
Status: DISCONTINUED | OUTPATIENT
Start: 2024-09-19 | End: 2024-09-19 | Stop reason: HOSPADM

## 2024-09-19 RX ORDER — SODIUM CHLORIDE 9 MG/ML
40 INJECTION, SOLUTION INTRAVENOUS AS NEEDED
Status: DISCONTINUED | OUTPATIENT
Start: 2024-09-19 | End: 2024-09-19 | Stop reason: HOSPADM

## 2024-09-19 RX ORDER — FENTANYL CITRATE 50 UG/ML
INJECTION, SOLUTION INTRAMUSCULAR; INTRAVENOUS AS NEEDED
Status: DISCONTINUED | OUTPATIENT
Start: 2024-09-19 | End: 2024-09-19 | Stop reason: SURG

## 2024-09-19 RX ADMIN — Medication 100 MG: at 14:27

## 2024-09-19 RX ADMIN — DEXAMETHASONE SODIUM PHOSPHATE 4 MG: 4 INJECTION, SOLUTION INTRAMUSCULAR; INTRAVENOUS at 14:30

## 2024-09-19 RX ADMIN — ACETAMINOPHEN 1000 MG: 500 TABLET ORAL at 13:05

## 2024-09-19 RX ADMIN — FENTANYL CITRATE 50 MCG: 50 INJECTION, SOLUTION INTRAMUSCULAR; INTRAVENOUS at 14:27

## 2024-09-19 RX ADMIN — CEFAZOLIN 2 G: 1 INJECTION, POWDER, FOR SOLUTION INTRAMUSCULAR; INTRAVENOUS at 14:23

## 2024-09-19 RX ADMIN — ONDANSETRON HYDROCHLORIDE 4 MG: 2 SOLUTION INTRAMUSCULAR; INTRAVENOUS at 14:38

## 2024-09-19 RX ADMIN — LIDOCAINE HYDROCHLORIDE 100 MG: 20 INJECTION, SOLUTION INTRAVENOUS at 14:23

## 2024-09-19 RX ADMIN — SODIUM CITRATE AND CITRIC ACID MONOHYDRATE 30 ML: 334; 500 SOLUTION ORAL at 13:56

## 2024-09-19 RX ADMIN — FAMOTIDINE 20 MG: 10 INJECTION INTRAVENOUS at 13:05

## 2024-09-19 RX ADMIN — ROCURONIUM BROMIDE 25 MG: 10 INJECTION, SOLUTION INTRAVENOUS at 14:34

## 2024-09-19 RX ADMIN — PROPOFOL 170 MG: 10 INJECTION, EMULSION INTRAVENOUS at 14:27

## 2024-09-19 RX ADMIN — SODIUM CHLORIDE, POTASSIUM CHLORIDE, SODIUM LACTATE AND CALCIUM CHLORIDE 9 ML/HR: 600; 310; 30; 20 INJECTION, SOLUTION INTRAVENOUS at 12:56

## 2024-09-19 RX ADMIN — FENTANYL CITRATE 50 MCG: 50 INJECTION, SOLUTION INTRAMUSCULAR; INTRAVENOUS at 14:38

## 2024-09-19 RX ADMIN — METOCLOPRAMIDE HYDROCHLORIDE 10 MG: 5 INJECTION INTRAMUSCULAR; INTRAVENOUS at 13:06

## 2024-09-19 RX ADMIN — SUGAMMADEX 200 MG: 100 INJECTION, SOLUTION INTRAVENOUS at 14:52

## 2024-09-19 RX ADMIN — MIDAZOLAM HYDROCHLORIDE 2 MG: 1 INJECTION, SOLUTION INTRAMUSCULAR; INTRAVENOUS at 13:57

## 2024-09-19 RX ADMIN — LIDOCAINE HYDROCHLORIDE 100 MG: 20 INJECTION, SOLUTION INTRAVENOUS at 14:27

## 2024-09-20 ENCOUNTER — TELEPHONE (OUTPATIENT)
Dept: GASTROENTEROLOGY | Facility: CLINIC | Age: 74
End: 2024-09-20
Payer: MEDICARE

## 2024-09-21 DIAGNOSIS — M65.332 TRIGGER FINGER, LEFT MIDDLE FINGER: ICD-10-CM

## 2024-09-23 RX ORDER — DICLOFENAC SODIUM 75 MG/1
75 TABLET, DELAYED RELEASE ORAL 2 TIMES DAILY
Qty: 60 TABLET | Refills: 1 | Status: SHIPPED | OUTPATIENT
Start: 2024-09-23

## 2024-09-24 ENCOUNTER — TELEPHONE (OUTPATIENT)
Dept: GASTROENTEROLOGY | Facility: CLINIC | Age: 74
End: 2024-09-24
Payer: MEDICARE

## 2024-09-24 NOTE — PRE-PROCEDURE INSTRUCTIONS
"PAT call attempted.  No answer.  Detailed message with date and arrival time of 1030 given.  Instructed that arrival time is not procedure time but allows time to prepare for procedure. Come to entrance \"C\"; must have adult  for transportation home; may have two visitors; however, children under 12 must remain in waiting area; instructed on diet/clear liquids/NPO/bowel prep, if needed; may take normal meds two hours prior to arrival time except for blood thinners, antidiabetics, diuretics, and weight loss meds.  Instructed to return call to confirm receipt of instructions and for any questions.  Hold Ozempic for one week prior to procedure.  "

## 2024-09-25 ENCOUNTER — OFFICE VISIT (OUTPATIENT)
Dept: UROLOGY | Facility: CLINIC | Age: 74
End: 2024-09-25
Payer: MEDICARE

## 2024-09-25 VITALS
SYSTOLIC BLOOD PRESSURE: 119 MMHG | WEIGHT: 220 LBS | DIASTOLIC BLOOD PRESSURE: 60 MMHG | HEIGHT: 67 IN | BODY MASS INDEX: 34.53 KG/M2

## 2024-09-25 DIAGNOSIS — N13.39 OTHER HYDRONEPHROSIS: ICD-10-CM

## 2024-09-25 DIAGNOSIS — C64.1 RENAL CELL ADENOCARCINOMA, RIGHT: Primary | ICD-10-CM

## 2024-09-26 ENCOUNTER — TELEPHONE (OUTPATIENT)
Dept: INTERNAL MEDICINE | Facility: CLINIC | Age: 74
End: 2024-09-26
Payer: MEDICARE

## 2024-09-26 ENCOUNTER — ANESTHESIA EVENT (OUTPATIENT)
Dept: GASTROENTEROLOGY | Facility: HOSPITAL | Age: 74
End: 2024-09-26
Payer: MEDICARE

## 2024-09-26 ENCOUNTER — EXTERNAL PBMM DATA (OUTPATIENT)
Dept: PHARMACY | Facility: OTHER | Age: 74
End: 2024-09-26
Payer: MEDICARE

## 2024-09-26 NOTE — TELEPHONE ENCOUNTER
"    Caller: Sowmya Hodges \"Alecia\"    Relationship to patient: Self    Best call back number: [phone]    Chief complaint: FOLLOW UP KIDNEY     Type of visit: OFFICE VISIT     Requested date: 9-26-24    If rescheduling, when is the original appointment: 10-21-24    Additional notes:FOLLOW UP FROM ER VISIT TWO WEEKS AGO AND PATIENT WANTED TO SEE HER PROVIDER ASAP REGARDING HER KIDNEYS. PATIENT WOULD LIKE TO SEE HER BEFORE 10-21-24.          "

## 2024-09-27 ENCOUNTER — ANESTHESIA (OUTPATIENT)
Dept: GASTROENTEROLOGY | Facility: HOSPITAL | Age: 74
End: 2024-09-27
Payer: MEDICARE

## 2024-09-27 ENCOUNTER — HOSPITAL ENCOUNTER (OUTPATIENT)
Facility: HOSPITAL | Age: 74
Setting detail: HOSPITAL OUTPATIENT SURGERY
Discharge: HOME | End: 2024-09-27
Attending: INTERNAL MEDICINE | Admitting: INTERNAL MEDICINE
Payer: MEDICARE

## 2024-09-27 ENCOUNTER — TELEPHONE (OUTPATIENT)
Dept: GASTROENTEROLOGY | Facility: CLINIC | Age: 74
End: 2024-09-27
Payer: MEDICARE

## 2024-09-27 VITALS
WEIGHT: 223.11 LBS | BODY MASS INDEX: 34.94 KG/M2 | SYSTOLIC BLOOD PRESSURE: 133 MMHG | OXYGEN SATURATION: 100 % | DIASTOLIC BLOOD PRESSURE: 69 MMHG | RESPIRATION RATE: 20 BRPM | HEART RATE: 63 BPM | TEMPERATURE: 98 F

## 2024-09-27 DIAGNOSIS — K74.69 OTHER CIRRHOSIS OF LIVER: ICD-10-CM

## 2024-09-27 LAB — GLUCOSE BLDC GLUCOMTR-MCNC: 106 MG/DL (ref 70–99)

## 2024-09-27 PROCEDURE — 82948 REAGENT STRIP/BLOOD GLUCOSE: CPT | Performed by: NURSE ANESTHETIST, CERTIFIED REGISTERED

## 2024-09-27 PROCEDURE — 25810000003 LACTATED RINGERS PER 1000 ML: Performed by: NURSE ANESTHETIST, CERTIFIED REGISTERED

## 2024-09-27 PROCEDURE — 45380 COLONOSCOPY AND BIOPSY: CPT | Performed by: INTERNAL MEDICINE

## 2024-09-27 PROCEDURE — 88305 TISSUE EXAM BY PATHOLOGIST: CPT | Performed by: INTERNAL MEDICINE

## 2024-09-27 PROCEDURE — 25010000002 PROPOFOL 500 MG/50ML EMULSION: Performed by: NURSE ANESTHETIST, CERTIFIED REGISTERED

## 2024-09-27 RX ORDER — SODIUM CHLORIDE, SODIUM LACTATE, POTASSIUM CHLORIDE, CALCIUM CHLORIDE 600; 310; 30; 20 MG/100ML; MG/100ML; MG/100ML; MG/100ML
30 INJECTION, SOLUTION INTRAVENOUS CONTINUOUS
Status: DISCONTINUED | OUTPATIENT
Start: 2024-09-27 | End: 2024-09-27 | Stop reason: HOSPADM

## 2024-09-27 RX ORDER — PROPOFOL 10 MG/ML
INJECTION, EMULSION INTRAVENOUS AS NEEDED
Status: DISCONTINUED | OUTPATIENT
Start: 2024-09-27 | End: 2024-09-27 | Stop reason: SURG

## 2024-09-27 RX ORDER — LIDOCAINE HYDROCHLORIDE 20 MG/ML
INJECTION, SOLUTION EPIDURAL; INFILTRATION; INTRACAUDAL; PERINEURAL AS NEEDED
Status: DISCONTINUED | OUTPATIENT
Start: 2024-09-27 | End: 2024-09-27 | Stop reason: SURG

## 2024-09-27 RX ADMIN — LIDOCAINE HYDROCHLORIDE 40 MG: 20 INJECTION, SOLUTION EPIDURAL; INFILTRATION; INTRACAUDAL; PERINEURAL at 12:35

## 2024-09-27 RX ADMIN — PROPOFOL 150 MCG/KG/MIN: 10 INJECTION, EMULSION INTRAVENOUS at 12:38

## 2024-09-27 RX ADMIN — PROPOFOL 30 MG: 10 INJECTION, EMULSION INTRAVENOUS at 12:37

## 2024-09-27 RX ADMIN — PROPOFOL 60 MG: 10 INJECTION, EMULSION INTRAVENOUS at 12:35

## 2024-09-27 RX ADMIN — SODIUM CHLORIDE, POTASSIUM CHLORIDE, SODIUM LACTATE AND CALCIUM CHLORIDE 30 ML/HR: 600; 310; 30; 20 INJECTION, SOLUTION INTRAVENOUS at 11:50

## 2024-09-27 NOTE — H&P
Pre Procedure History & Physical    Chief Complaint:   Cirrhosis    Subjective     HPI:   Cirrhosis    Past Medical History:   Past Medical History:   Diagnosis Date    Anemia     Anxiety     Arthritis     Bronchitis     Constipation     Depression     Diabetes     Diverticulitis     Dyspnea 04/03/2024    Epilepsy     Fracture of distal fibula 09/26/2017    right    GERD (gastroesophageal reflux disease)     Heart disease     HTN (hypertension)     Hyperlipidemia     Limb swelling     Lower abdominal pain     Nausea and vomiting in adult 06/03/2022    TAYLOR (obstructive sleep apnea)     Pneumonia     Polyp, sinus maxillary     Renal cell adenocarcinoma, right 12/10/2014    Seasonal allergies     Shortness of breath        Past Surgical History:  Past Surgical History:   Procedure Laterality Date    CARDIAC SURGERY      open heart at age 4     CARDIAC VALVE SURGERY      CHOLECYSTECTOMY      COLONOSCOPY  04/12/2017    dr monique    COLONOSCOPY N/A 1/17/2023    Procedure: COLONOSCOPY WITH BIOPSIES, POLYPECTOMY;  Surgeon: Sean Monique MD;  Location: [facility];  Service: Gastroenterology;  Laterality: N/A;  COLON POLYP    CYSTOSCOPY, URETEROSCOPY, RETROGRADE PYELOGRAM, STENT INSERTION Left 9/19/2024    Procedure: CYSTOSCOPY URETEROSCOPY RETROGRADE PYELOGRAM STENT INSERTION;  Surgeon: Modesta Butts MD;  Location: [facility] MA[facility] OR;  Service: Urology;  Laterality: Left;    ENDOSCOPY  04/12/2017    dr monique    ENDOSCOPY N/A 6/9/2022    Procedure: ESOPHAGOGASTRODUODENOSCOPY with biopsy;  Surgeon: Sean Monique MD;  Location: [facility];  Service: Gastroenterology;  Laterality: N/A;  gastritis    EYE SURGERY      implant    NEPHRECTOMY PARTIAL Right 12/02/2014    robotic right partial nephrectomy w dr butts    OTHER SURGICAL HISTORY      joint surgery    SALIVARY GLAND SURGERY      TONSILLECTOMY      TOTAL KNEE ARTHROPLASTY Right     UPPER GASTROINTESTINAL ENDOSCOPY  12/22/2020    Dr. Monique     WRIST SURGERY Left        Family History:  Family History   Problem Relation Age of Onset    Arthritis Mother     Arthritis Father     Cancer Father     Arthritis Sister     Arthritis Brother     Colon cancer Neg Hx     Malig Hyperthermia Neg Hx        Social History:   reports that she quit smoking about 24 years ago. Her smoking use included cigarettes. She started smoking about 29 years ago. She has a 5 pack-year smoking history. She has never used smokeless tobacco. She reports that she does not drink alcohol and does not use drugs.    Medications:   Medications Prior to Admission   Medication Sig Dispense Refill Last Dose    atorvastatin (LIPITOR) 20 MG tablet Take 1 tablet by mouth Daily. 90 tablet 1 9/26/2024    clonazePAM (KlonoPIN) 1 MG tablet Take 1 tablet by mouth 2 (Two) Times a Day As Needed for Anxiety. 60 tablet 2 9/26/2024    dapagliflozin Propanediol (Farxiga) 10 MG tablet TAKE 10 MG BY MOUTH DAILY. 90 tablet 0 9/26/2024    diclofenac (VOLTAREN) 75 MG EC tablet TAKE 1 TABLET BY MOUTH TWICE A DAY 60 tablet 1 9/26/2024    dicyclomine (BENTYL) 20 MG tablet Take 1 po before meals and at bedtimes as needed for cramping 90 tablet 0 9/26/2024    escitalopram (LEXAPRO) 20 MG tablet Take 1 tablet by mouth Daily. 90 tablet 1 9/26/2024    fluticasone (FLONASE) 50 MCG/ACT nasal spray USE 1-2 SPRAYS IN EACH NOSTRIL ONCE DAILY AS NEEDED 48 mL 1 9/26/2024    levETIRAcetam (KEPPRA) 750 MG tablet TAKE 1 TABLET BY MOUTH TWICE A  tablet 1 9/26/2024    levocetirizine (XYZAL) 5 MG tablet TAKE 1 TABLET BY MOUTH EVERY DAY IN THE EVENING 90 tablet 1 9/26/2024    lisinopril (PRINIVIL,ZESTRIL) 2.5 MG tablet Take 1 tablet by mouth Daily. 90 tablet 1 9/26/2024    ondansetron ODT (ZOFRAN-ODT) 4 MG disintegrating tablet Place 1 tablet on the tongue Every 8 (Eight) Hours As Needed for Vomiting or Nausea. 12 tablet 0 9/26/2024    oxyCODONE-acetaminophen (PERCOCET) 7.5-325 MG per tablet Take 1 tablet every 8 hours by oral  route as needed for 30 days.   9/26/2024    pantoprazole (PROTONIX) 40 MG EC tablet Take 1 tablet by mouth Daily. 90 tablet 1 9/26/2024    Tenapanor HCl (Ibsrela) 50 MG tablet Take 1 tablet by mouth 2 (Two) Times a Day. 60 tablet 3 9/26/2024    traZODone (DESYREL) 50 MG tablet TAKE 1 TABLET BY MOUTH EVERYDAY AT BEDTIME 90 tablet 1 9/26/2024    vitamin D3 125 MCG (5000 UT) capsule capsule Take 1 capsule by mouth Daily.   9/26/2024    Blood Glucose Monitoring Suppl (Accu-Chek Sabrina Plus) w/Device kit 1 kit Take As Directed. To checke blood sugar up to 3 times a day. DX E11.9 1 kit 0     Diclofenac Sodium (VOLTAREN) 1 % gel gel Apply 4 g topically to the appropriate area as directed 3 (Three) Times a Day As Needed.       Insulin Pen Needle (Pen Needles) 32G X 4 MM misc 1 each 1 (One) Time Per Week. 90 each 1     lidocaine (LIDODERM) 5 % Place 1 patch on the skin as directed by provider Daily.       Ozempic, 1 MG/DOSE, 4 MG/3ML solution pen-injector INJECT 1 MG UNDER THE SKIN INTO THE APPROPRIATE AREA AS DIRECTED EVERY 7 (SEVEN) DAYS. 3 mL 2 9/13/2024       Allergies:  Codeine        Objective     Pulse 61, temperature 97.5 °F (36.4 °C), temperature source Temporal, resp. rate 18, weight 101 kg (223 lb 1.7 oz), SpO2 100%, not currently breastfeeding.    Physical Exam   Constitutional: Pt is oriented to person, place, and time and well-developed, well-nourished, and in no distress.   Mouth/Throat: Oropharynx is clear and moist.   Neck: Normal range of motion.   Cardiovascular: Normal rate, regular rhythm and normal heart sounds.    Pulmonary/Chest: Effort normal and breath sounds normal.   Abdominal: Soft. Nontender  Skin: Skin is warm and dry.   Psychiatric: Mood, memory, affect and judgment normal.     Assessment & Plan     Diagnosis:  Cirrhosis    Anticipated Surgical Procedure:  EGD    The risks, benefits, and alternatives of this procedure have been discussed with the patient or the responsible party- the patient  understands and agrees to proceed.

## 2024-09-27 NOTE — TELEPHONE ENCOUNTER
Hub staff attempted to follow warm transfer process and was unsuccessful     Caller: TANO GARCIA    Relationship to patient: Other    Best call back number: [phone]    Patient is needing: NURSE CALLED TO SCHEDULE A FOLLOW UP APPT WITH MARILYN. PT HAD A EGD DONE AND THE ARE NEEDING THE PT TO BE SCHEDULE FOR A FOLLOW UP APPT WITH MARILYN IN A WEEK. PLEASE GIVE PT A CALL BACK AND IF NOT ABLE TO REACH PT. DID NOT GET PERMISSION TO LEAVE VOICEMAIL. NURSE NUMBER IS PROVIDED BUT PLEASE REACH OUT TO THE PT TO SCHEDULE APPT.

## 2024-10-01 ENCOUNTER — LAB (OUTPATIENT)
Dept: LAB | Facility: HOSPITAL | Age: 74
End: 2024-10-01
Payer: MEDICARE

## 2024-10-01 ENCOUNTER — TELEPHONE (OUTPATIENT)
Age: 74
End: 2024-10-01
Payer: MEDICARE

## 2024-10-01 DIAGNOSIS — K74.69 OTHER CIRRHOSIS OF LIVER: ICD-10-CM

## 2024-10-01 DIAGNOSIS — K76.0 FATTY LIVER: ICD-10-CM

## 2024-10-01 LAB
ALBUMIN SERPL-MCNC: 3.9 G/DL (ref 3.5–5.2)
ALBUMIN/GLOB SERPL: 1.5 G/DL
ALP SERPL-CCNC: 86 U/L (ref 39–117)
ALPHA-FETOPROTEIN: <2 NG/ML (ref 0–8.3)
ALT SERPL W P-5'-P-CCNC: 28 U/L (ref 1–33)
ANION GAP SERPL CALCULATED.3IONS-SCNC: 8 MMOL/L (ref 5–15)
AST SERPL-CCNC: 29 U/L (ref 1–32)
BILIRUB SERPL-MCNC: 0.6 MG/DL (ref 0–1.2)
BUN SERPL-MCNC: 15 MG/DL (ref 8–23)
BUN/CREAT SERPL: 16.3 (ref 7–25)
CALCIUM SPEC-SCNC: 9.4 MG/DL (ref 8.6–10.5)
CHLORIDE SERPL-SCNC: 105 MMOL/L (ref 98–107)
CO2 SERPL-SCNC: 27 MMOL/L (ref 22–29)
CREAT SERPL-MCNC: 0.92 MG/DL (ref 0.57–1)
DEPRECATED RDW RBC AUTO: 46.4 FL (ref 37–54)
EGFRCR SERPLBLD CKD-EPI 2021: 66.3 ML/MIN/1.73
ERYTHROCYTE [DISTWIDTH] IN BLOOD BY AUTOMATED COUNT: 13.5 % (ref 12.3–15.4)
GLOBULIN UR ELPH-MCNC: 2.6 GM/DL
GLUCOSE SERPL-MCNC: 72 MG/DL (ref 65–99)
HCT VFR BLD AUTO: 36.3 % (ref 34–46.6)
HGB BLD-MCNC: 11.7 G/DL (ref 12–15.9)
MCH RBC QN AUTO: 30.2 PG (ref 26.6–33)
MCHC RBC AUTO-ENTMCNC: 32.2 G/DL (ref 31.5–35.7)
MCV RBC AUTO: 93.8 FL (ref 79–97)
PLATELET # BLD AUTO: 225 10*3/MM3 (ref 140–450)
PMV BLD AUTO: 9.4 FL (ref 6–12)
POTASSIUM SERPL-SCNC: 4 MMOL/L (ref 3.5–5.2)
PROT SERPL-MCNC: 6.5 G/DL (ref 6–8.5)
RBC # BLD AUTO: 3.87 10*6/MM3 (ref 3.77–5.28)
SODIUM SERPL-SCNC: 140 MMOL/L (ref 136–145)
WBC NRBC COR # BLD AUTO: 5.02 10*3/MM3 (ref 3.4–10.8)

## 2024-10-01 PROCEDURE — 36415 COLL VENOUS BLD VENIPUNCTURE: CPT

## 2024-10-01 PROCEDURE — 80053 COMPREHEN METABOLIC PANEL: CPT

## 2024-10-01 PROCEDURE — 85027 COMPLETE CBC AUTOMATED: CPT

## 2024-10-01 PROCEDURE — 82105 ALPHA-FETOPROTEIN SERUM: CPT

## 2024-10-01 NOTE — PROGRESS NOTES
EGD 9/27/2020: Small hiatal hernia, normal esophagus, 2 small polyps were found in the stomach-biopsy shows gastropathy and hyperplastic changes otherwise negative, small amount of food found in the stomach body, normal duodenum  Please ensure no NSAIDs  Patient has follow-up with me in 2 days

## 2024-10-02 ENCOUNTER — TELEPHONE (OUTPATIENT)
Dept: GASTROENTEROLOGY | Facility: CLINIC | Age: 74
End: 2024-10-02
Payer: MEDICARE

## 2024-10-02 ENCOUNTER — OFFICE VISIT (OUTPATIENT)
Dept: INTERNAL MEDICINE | Facility: CLINIC | Age: 74
End: 2024-10-02
Payer: MEDICARE

## 2024-10-02 VITALS
HEIGHT: 67 IN | RESPIRATION RATE: 18 BRPM | DIASTOLIC BLOOD PRESSURE: 58 MMHG | TEMPERATURE: 97.7 F | BODY MASS INDEX: 33.9 KG/M2 | WEIGHT: 216 LBS | SYSTOLIC BLOOD PRESSURE: 102 MMHG | OXYGEN SATURATION: 96 % | HEART RATE: 75 BPM

## 2024-10-02 DIAGNOSIS — D64.9 ANEMIA, UNSPECIFIED TYPE: Primary | ICD-10-CM

## 2024-10-02 DIAGNOSIS — Z12.31 ENCOUNTER FOR SCREENING MAMMOGRAM FOR BREAST CANCER: ICD-10-CM

## 2024-10-02 DIAGNOSIS — E11.65 TYPE 2 DIABETES MELLITUS WITH HYPERGLYCEMIA, WITHOUT LONG-TERM CURRENT USE OF INSULIN: Primary | ICD-10-CM

## 2024-10-02 DIAGNOSIS — F41.1 GENERALIZED ANXIETY DISORDER: ICD-10-CM

## 2024-10-02 DIAGNOSIS — N13.30 HYDRONEPHROSIS, UNSPECIFIED HYDRONEPHROSIS TYPE: ICD-10-CM

## 2024-10-02 DIAGNOSIS — Z23 ENCOUNTER FOR VACCINATION: ICD-10-CM

## 2024-10-02 DIAGNOSIS — D64.9 ANEMIA, UNSPECIFIED TYPE: ICD-10-CM

## 2024-10-02 PROCEDURE — 1160F RVW MEDS BY RX/DR IN RCRD: CPT | Performed by: PHYSICIAN ASSISTANT

## 2024-10-02 PROCEDURE — 1126F AMNT PAIN NOTED NONE PRSNT: CPT | Performed by: PHYSICIAN ASSISTANT

## 2024-10-02 PROCEDURE — 3044F HG A1C LEVEL LT 7.0%: CPT | Performed by: PHYSICIAN ASSISTANT

## 2024-10-02 PROCEDURE — 1159F MED LIST DOCD IN RCRD: CPT | Performed by: PHYSICIAN ASSISTANT

## 2024-10-02 PROCEDURE — 90662 IIV NO PRSV INCREASED AG IM: CPT | Performed by: PHYSICIAN ASSISTANT

## 2024-10-02 PROCEDURE — 99214 OFFICE O/P EST MOD 30 MIN: CPT | Performed by: PHYSICIAN ASSISTANT

## 2024-10-02 PROCEDURE — 90480 ADMN SARSCOV2 VAC 1/ONLY CMP: CPT | Performed by: PHYSICIAN ASSISTANT

## 2024-10-02 PROCEDURE — G0008 ADMIN INFLUENZA VIRUS VAC: HCPCS | Performed by: PHYSICIAN ASSISTANT

## 2024-10-02 PROCEDURE — 3078F DIAST BP <80 MM HG: CPT | Performed by: PHYSICIAN ASSISTANT

## 2024-10-02 PROCEDURE — 3074F SYST BP LT 130 MM HG: CPT | Performed by: PHYSICIAN ASSISTANT

## 2024-10-02 PROCEDURE — 91320 SARSCV2 VAC 30MCG TRS-SUC IM: CPT | Performed by: PHYSICIAN ASSISTANT

## 2024-10-02 RX ORDER — BUSPIRONE HYDROCHLORIDE 5 MG/1
5 TABLET ORAL 3 TIMES DAILY
Qty: 90 TABLET | Refills: 1 | Status: SHIPPED | OUTPATIENT
Start: 2024-10-02

## 2024-10-02 NOTE — PROGRESS NOTES
Chief Complaint  Diabetes, Depression, Anxiety, and Hyperlipidemia    Subjective          Sowmya Hodges presents to [facility]  History of Present Illness  Pt here for follow up   Anxiety: has not been using buspar  Has been taking klonopin as needed  Has had flare since recent egd  Denies si/hi    Pt states she has been feeling full in stomach lately  She had egd and had food in stomach. Was told it was from ozempic.   Has had a lot of nausea recently.   DM: bg has been doing well    Hydronephrosis: seeing urology  Had a stent placed  Would like to see nephro locally    Past Medical History:   Diagnosis Date    Anemia     Anxiety     Arthritis     Bronchitis     Constipation     Depression     Diabetes     Diverticulitis     Dyspnea 04/03/2024    Epilepsy     Fracture of distal fibula 09/26/2017    right    GERD (gastroesophageal reflux disease)     Heart disease     HTN (hypertension)     Hyperlipidemia     Limb swelling     Lower abdominal pain     Nausea and vomiting in adult 06/03/2022    TAYLOR (obstructive sleep apnea)     Pneumonia     Polyp, sinus maxillary     Renal cell adenocarcinoma, right 12/10/2014    Seasonal allergies     Shortness of breath         Past Surgical History:   Procedure Laterality Date    CARDIAC SURGERY      open heart at age 4     CARDIAC VALVE SURGERY      CHOLECYSTECTOMY      COLONOSCOPY  04/12/2017    dr monique    COLONOSCOPY N/A 1/17/2023    Procedure: COLONOSCOPY WITH BIOPSIES, POLYPECTOMY;  Surgeon: Sean Monique MD;  Location: [facility] ENDOSCOPY;  Service: Gastroenterology;  Laterality: N/A;  COLON POLYP    CYSTOSCOPY, URETEROSCOPY, RETROGRADE PYELOGRAM, STENT INSERTION Left 9/19/2024    Procedure: CYSTOSCOPY URETEROSCOPY RETROGRADE PYELOGRAM STENT INSERTION;  Surgeon: Modesta Butts MD;  Location: [facility] MAIN OR;  Service: Urology;  Laterality: Left;    ENDOSCOPY  04/12/2017    dr monique    ENDOSCOPY N/A 6/9/2022     Procedure: ESOPHAGOGASTRODUODENOSCOPY with biopsy;  Surgeon: Sean Oneil MD;  Location: [facility] ENDOSCOPY;  Service: Gastroenterology;  Laterality: N/A;  gastritis    ENDOSCOPY N/A 9/27/2024    Procedure: ESOPHAGOGASTRODUODENOSCOPY with biopsy;  Surgeon: Sean Oneil MD;  Location: [facility] ENDOSCOPY;  Service: Gastroenterology;  Laterality: N/A;  retained food, hiatal hernia, gastric polyp    EYE SURGERY      implant    NEPHRECTOMY PARTIAL Right 12/02/2014    robotic right partial nephrectomy w dr garvey    OTHER SURGICAL HISTORY      joint surgery    SALIVARY GLAND SURGERY      TONSILLECTOMY      TOTAL KNEE ARTHROPLASTY Right     UPPER GASTROINTESTINAL ENDOSCOPY  12/22/2020    Dr. Oneil    WRIST SURGERY Left         Current Outpatient Medications on File Prior to Visit   Medication Sig Dispense Refill    atorvastatin (LIPITOR) 20 MG tablet Take 1 tablet by mouth Daily. 90 tablet 1    Blood Glucose Monitoring Suppl (Accu-Chek Sabrina Plus) w/Device kit 1 kit Take As Directed. To checke blood sugar up to 3 times a day. DX E11.9 1 kit 0    clonazePAM (KlonoPIN) 1 MG tablet Take 1 tablet by mouth 2 (Two) Times a Day As Needed for Anxiety. 60 tablet 2    dapagliflozin Propanediol (Farxiga) 10 MG tablet TAKE 10 MG BY MOUTH DAILY. 90 tablet 0    diclofenac (VOLTAREN) 75 MG EC tablet TAKE 1 TABLET BY MOUTH TWICE A DAY 60 tablet 1    Diclofenac Sodium (VOLTAREN) 1 % gel gel Apply 4 g topically to the appropriate area as directed 3 (Three) Times a Day As Needed.      dicyclomine (BENTYL) 20 MG tablet Take 1 po before meals and at bedtimes as needed for cramping 90 tablet 0    escitalopram (LEXAPRO) 20 MG tablet Take 1 tablet by mouth Daily. 90 tablet 1    fluticasone (FLONASE) 50 MCG/ACT nasal spray USE 1-2 SPRAYS IN EACH NOSTRIL ONCE DAILY AS NEEDED 48 mL 1    Insulin Pen Needle (Pen Needles) 32G X 4 MM misc 1 each 1 (One) Time Per Week. 90 each 1    levETIRAcetam (KEPPRA) 750 MG tablet TAKE 1 TABLET BY MOUTH  "TWICE A  tablet 1    levocetirizine (XYZAL) 5 MG tablet TAKE 1 TABLET BY MOUTH EVERY DAY IN THE EVENING 90 tablet 1    lidocaine (LIDODERM) 5 % Place 1 patch on the skin as directed by provider Daily.      lisinopril (PRINIVIL,ZESTRIL) 2.5 MG tablet Take 1 tablet by mouth Daily. 90 tablet 1    ondansetron ODT (ZOFRAN-ODT) 4 MG disintegrating tablet Place 1 tablet on the tongue Every 8 (Eight) Hours As Needed for Vomiting or Nausea. 12 tablet 0    oxyCODONE-acetaminophen (PERCOCET) 7.5-325 MG per tablet Take 1 tablet every 8 hours by oral route as needed for 30 days.      pantoprazole (PROTONIX) 40 MG EC tablet Take 1 tablet by mouth Daily. 90 tablet 1    Tenapanor HCl (Ibsrela) 50 MG tablet Take 1 tablet by mouth 2 (Two) Times a Day. 60 tablet 3    traZODone (DESYREL) 50 MG tablet TAKE 1 TABLET BY MOUTH EVERYDAY AT BEDTIME 90 tablet 1    vitamin D3 125 MCG (5000 UT) capsule capsule Take 1 capsule by mouth Daily.      [DISCONTINUED] Ozempic, 1 MG/DOSE, 4 MG/3ML solution pen-injector INJECT 1 MG UNDER THE SKIN INTO THE APPROPRIATE AREA AS DIRECTED EVERY 7 (SEVEN) DAYS. 3 mL 2     No current facility-administered medications on file prior to visit.        Allergies   Allergen Reactions    Codeine Rash              Social History     Tobacco Use   Smoking Status Former    Current packs/day: 0.00    Average packs/day: 1 pack/day for 5.0 years (5.0 ttl pk-yrs)    Types: Cigarettes    Start date:     Quit date: 2000    Years since quittin.7   Smokeless Tobacco Never   Tobacco Comments    Less than 5 years          Objective   Vital Signs:   /58 (BP Location: Left arm, Patient Position: Sitting, Cuff Size: Large Adult)   Pulse 75   Temp 97.7 °F (36.5 °C) (Temporal)   Resp 18   Ht 170.2 cm (67\")   Wt 98 kg (216 lb)   SpO2 96%   BMI 33.83 kg/m²     Physical Exam  Vitals reviewed.   Constitutional:       Appearance: Normal appearance.   HENT:      Head: Normocephalic and atraumatic.      Nose: Nose " normal.      Mouth/Throat:      Mouth: Mucous membranes are moist.   Eyes:      Extraocular Movements: Extraocular movements intact.      Conjunctiva/sclera: Conjunctivae normal.      Pupils: Pupils are equal, round, and reactive to light.   Cardiovascular:      Rate and Rhythm: Normal rate and regular rhythm.   Pulmonary:      Effort: Pulmonary effort is normal.      Breath sounds: Normal breath sounds.   Abdominal:      General: Abdomen is flat. Bowel sounds are normal.      Palpations: Abdomen is soft.   Musculoskeletal:         General: Normal range of motion.   Neurological:      General: No focal deficit present.      Mental Status: She is alert and oriented to person, place, and time.   Psychiatric:         Mood and Affect: Mood normal.        Result Review :   The following data was reviewed by: Tamara Singer PA-C on 10/02/2024:  Common labs          9/4/2024    20:49 9/18/2024    08:49 10/1/2024    14:59   Common Labs   Glucose 149  155  72    BUN 17  14  15    Creatinine 1.19  1.11  0.92    Sodium 140  138  140    Potassium 3.8  4.0  4.0    Chloride 105  103  105    Calcium 9.2  8.8  9.4    Albumin 3.8  3.4  3.9    Total Bilirubin 0.6  0.7  0.6    Alkaline Phosphatase 92  80  86    AST (SGOT) 24  21  29    ALT (SGPT) 27  20  28    WBC 7.65  7.28  5.02    Hemoglobin 11.8  12.1  11.7    Hematocrit 34.9  35.3  36.3    Platelets 222  187  225                          Assessment and Plan    Diagnoses and all orders for this visit:    1. Type 2 diabetes mellitus with hyperglycemia, without long-term current use of insulin (Primary)  Comments:  Will d/c ozempic due to nausea/fullness. Will start Januvia orally to continue to control sugar and monitor for improvement in GI sx  Orders:  -     MicroAlbumin, Urine, Random - Urine, Clean Catch    2. Encounter for screening mammogram for breast cancer  -     Mammo Screening Digital Tomosynthesis Bilateral With CAD; Future    3. Hydronephrosis, unspecified  hydronephrosis type  Comments:  Will refer to nephro per pt request  Orders:  -     Ambulatory Referral to Nephrology    4. Encounter for vaccination  -     COVID-19 (Pfizer) 12yrs+ (COMIRNATY)    5. Generalized anxiety disorder  Assessment & Plan:  Discussed anxiety is not well controlled. Previously prescribed buspar to use prn anxiety and then use Klonopin prn severe symptoms. Pt states she never got the buspar, will start. Discussed that it can be taken up to 3 times per day and used as needed. Discussed possible side effects of dizziness, confusion, headache. Patient understands and agrees.       Other orders  -     Fluzone High-Dose 65+yrs  -     SITagliptin (Januvia) 100 MG tablet; Take 1 tablet by mouth Daily.  Dispense: 30 tablet; Refill: 5  -     busPIRone (BUSPAR) 5 MG tablet; Take 1 tablet by mouth 3 (Three) Times a Day.  Dispense: 90 tablet; Refill: 1        Follow Up   Return in about 3 months (around 1/2/2025).  Patient was given instructions and counseling regarding her condition or for health maintenance advice. Please see specific information pulled into the AVS if appropriate.

## 2024-10-02 NOTE — TELEPHONE ENCOUNTER
----- Message from Chloe Euceda sent at 10/2/2024 10:29 AM EDT -----  Slightly anemic, please see if patient will do stool check for occult blood

## 2024-10-02 NOTE — TELEPHONE ENCOUNTER
S/w pt, she is agreeable to complete occult blood stool study and will  a stool kit tomorrow while in office. Stool study order placed.

## 2024-10-03 ENCOUNTER — OFFICE VISIT (OUTPATIENT)
Dept: GASTROENTEROLOGY | Facility: CLINIC | Age: 74
End: 2024-10-03
Payer: MEDICARE

## 2024-10-03 VITALS
BODY MASS INDEX: 35.16 KG/M2 | SYSTOLIC BLOOD PRESSURE: 113 MMHG | HEIGHT: 67 IN | HEART RATE: 70 BPM | WEIGHT: 224 LBS | DIASTOLIC BLOOD PRESSURE: 50 MMHG

## 2024-10-03 DIAGNOSIS — D64.9 ANEMIA, UNSPECIFIED TYPE: ICD-10-CM

## 2024-10-03 DIAGNOSIS — K74.69 OTHER CIRRHOSIS OF LIVER: ICD-10-CM

## 2024-10-03 DIAGNOSIS — K31.84 GASTROPARESIS: ICD-10-CM

## 2024-10-03 DIAGNOSIS — K58.1 IRRITABLE BOWEL SYNDROME WITH CONSTIPATION: Primary | ICD-10-CM

## 2024-10-03 LAB — HEMOCCULT STL QL IA: NEGATIVE

## 2024-10-03 PROCEDURE — 82274 ASSAY TEST FOR BLOOD FECAL: CPT | Performed by: NURSE PRACTITIONER

## 2024-10-03 RX ORDER — TENAPANOR HYDROCHLORIDE 53.2 MG/1
50 TABLET ORAL 2 TIMES DAILY
Qty: 60 TABLET | Refills: 3 | Status: SHIPPED | OUTPATIENT
Start: 2024-10-03

## 2024-10-03 RX ORDER — DOCUSATE SODIUM 100 MG/1
100 CAPSULE, LIQUID FILLED ORAL 2 TIMES DAILY
COMMUNITY

## 2024-10-03 NOTE — PATIENT INSTRUCTIONS
Gastroparesis    Gastroparesis is a condition in which food takes longer than normal to empty from the stomach. This condition is also known as delayed gastric emptying. It is usually a long-term (chronic) condition.  There is no cure, but there are treatments and things that you can do at home to help relieve symptoms. Treating the underlying condition that causes gastroparesis can also help relieve symptoms.  What are the causes?  In many cases, the cause of this condition is not known. Possible causes include:  A hormone (endocrine) disorder, such as hypothyroidism or diabetes.  A nervous system disease, such as Parkinson's disease or multiple sclerosis.  Cancer, infection, or surgery that affects the stomach or vagus nerve. The vagus nerve runs from your chest, through your neck, and to the lower part of your brain.  A connective tissue disorder, such as scleroderma.  Certain medicines.  What increases the risk?  You are more likely to develop this condition if:  You have certain disorders or diseases. These may include:  An endocrine disorder.  An eating disorder.  Amyloidosis.  Scleroderma.  Parkinson's disease.  Multiple sclerosis.  Cancer or infection of the stomach or the vagus nerve.  You have had surgery on your stomach or vagus nerve.  You take certain medicines.  You are female.  What are the signs or symptoms?  Symptoms of this condition include:  Feeling full after eating very little or a loss of appetite.  Nausea, vomiting, or heartburn.  Bloating of your abdomen.  Inconsistent blood sugar (glucose) levels on blood tests.  Unexplained weight loss.  Acid from the stomach coming up into the esophagus (gastroesophageal reflux).  Sudden tightening (spasm) of the stomach, which can be painful.  Symptoms may come and go. Some people may not notice any symptoms.  How is this diagnosed?  This condition is diagnosed with tests, such as:  Tests that check how long it takes food to move through the stomach and  intestines. These tests include:  Upper gastrointestinal (GI) series. For this test, you drink a liquid that shows up well on X-rays, and then X-rays are taken of your intestines.  Gastric emptying scintigraphy. For this test, you eat food that contains a small amount of radioactive material, and then scans are taken.  Wireless capsule GI monitoring system. For this test, you swallow a pill (capsule) that records information about how foods and fluid move through your stomach.  Gastric manometry. For this test, a tube is passed down your throat and into your stomach to measure electrical and muscular activity.  Endoscopy. For this test, a long, thin tube with a camera and light on the end is passed down your throat and into your stomach to check for problems in your stomach lining.  Ultrasound. This test uses sound waves to create images of the inside of your body. This can help rule out gallbladder disease or pancreatitis as a cause of your symptoms.  How is this treated?  There is no cure for this condition, but treatment and home care may relieve symptoms. Treatment may include:  Treating the underlying cause.  Managing your symptoms by making changes to your diet and exercise habits.  Taking medicines to control nausea and vomiting and to stimulate stomach muscles.  Getting food through a feeding tube in the hospital. This may be done in severe cases.  Having surgery to insert a device called a gastric electrical stimulator into your body. This device helps improve stomach emptying and control nausea and vomiting.  Follow these instructions at home:  Take over-the-counter and prescription medicines only as told by your health care provider.  Follow instructions from your health care provider about eating or drinking restrictions. Your health care provider may recommend that you:  Eat smaller meals more often.  Eat low-fat foods.  Eat low-fiber forms of high-fiber foods. For example, eat cooked vegetables instead  of raw vegetables.  Have only liquid foods instead of solid foods. Liquid foods are easier to digest.  Drink enough fluid to keep your urine pale yellow.  Exercise as often as told by your health care provider.  Keep all follow-up visits. This is important.  Contact a health care provider if you:  Notice that your symptoms do not improve with treatment.  Have new symptoms.  Get help right away if you:  Have severe pain in your abdomen that does not improve with treatment.  Have nausea that is severe or does not go away.  Vomit every time you drink fluids.  Summary  Gastroparesis is a long-term (chronic) condition in which food takes longer than normal to empty from the stomach.  Symptoms include nausea, vomiting, heartburn, bloating of your abdomen, and loss of appetite.  Eating smaller portions, low-fat foods, and low-fiber forms of high-fiber foods may help you manage your symptoms.  Get help right away if you have severe pain in your abdomen.  This information is not intended to replace advice given to you by your health care provider. Make sure you discuss any questions you have with your health care provider.  Document Revised: 04/26/2021 Document Reviewed: 04/26/2021  [employer] Patient Education © 2024 [employer]

## 2024-10-03 NOTE — PROGRESS NOTES
Patient Name: Sowmya Hodges   Visit Date: 10/03/2024   Patient ID: [id number]  Provider: LORENZO Paz    Sex: female  Location:  Location Address:  Location Phone: [address]  [place] [address] [phone]    YOB: 1950  Age: 74 y.o.   Primary Care Provider Tamara Singer PA-C      Referring Provider: No ref. provider found        Chief Complaint  EGD (Follow up ) and Abdominal Pain (Center ABD pain daily )    History of Present Illness  Patient initially presented in 2017 with abdominal pain and heartburn.     History of fatty liver seen on CT scan, hepatic work-up has been completed 4/2020 and negative.  Patient did report a cousin with cirrhosis and was told he had a genetic issue   Pt dx w cirrhosis 5/2023, noted on CT.  Hep A&B vaccines ordered.  9/1/2023 Fibroscan S3, F3  History of intermittent diarrhea-Xifaxan improved symptoms February 2022; has used Bentyl intermittently also      LAST EGD for persistent nausea vomiting 6/9/2022-esophagus normal, gastritis noted-biopsy with mild chronic inactive gastritis, normal duodenum, Carafate was given  LAST Colonoscopy 1/17/2023 for lower abd pain/cramping : good bowel prep, small polyp in the ascending colon-adenomatous, repeat 5 yrs, random colon bx negative     3/7/24: Lost 20 pounds intentionally  Ultrasound liver 5/1/2024: Coarsened hepatic echotexture compatible with cirrhosis, no liver lesion  Labs in May look great - LFTs improved to normal  6/26/24: Ibsrela Rx'd    ER visit 9/4/2024 for abdominal pain GFR 48, white count normal, hemoglobin 11.8, UA negative CT abdomen and pelvis 9/5/2024 showed interval development of left hydronephrosis without hydroureter, 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   Pt was given zofran at ER and pt requests RF   Pt likes Ibsrela - but she was only taking once/d and also needing Colace BID   Pt has not seen any blood in stool or black stool   Pt had stent placed by DR Butts 9/19/24, GFR improved to normal. Labs 10/1 look great.   Past Medical History:   Diagnosis Date    Anemia     Anxiety     Arthritis     Bronchitis     Constipation     Depression     Diabetes     Diverticulitis     Dyspnea 04/03/2024    Epilepsy     Fracture of distal fibula 09/26/2017    right    GERD (gastroesophageal reflux disease)     Heart disease     HTN (hypertension)     Hyperlipidemia     Limb swelling     Lower abdominal pain     Nausea and vomiting in adult 06/03/2022    TAYLOR (obstructive sleep apnea)     Pneumonia     Polyp, sinus maxillary     Renal cell adenocarcinoma, right 12/10/2014    Seasonal allergies     Shortness of breath        Past Surgical History:   Procedure Laterality Date    CARDIAC SURGERY      open heart at age 4     CARDIAC VALVE SURGERY      CHOLECYSTECTOMY      COLONOSCOPY  04/12/2017    dr monique    COLONOSCOPY N/A 1/17/2023    Procedure: COLONOSCOPY WITH BIOPSIES, POLYPECTOMY;  Surgeon: Sean Monique MD;  Location: [facility] ENDOSCOPY;  Service: Gastroenterology;  Laterality: N/A;  COLON POLYP    CYSTOSCOPY, URETEROSCOPY, RETROGRADE PYELOGRAM, STENT INSERTION Left 9/19/2024    Procedure: CYSTOSCOPY URETEROSCOPY RETROGRADE PYELOGRAM STENT INSERTION;  Surgeon: Modesta Butts MD;  Location: [facility] MAIN OR;  Service: Urology;  Laterality: Left;    ENDOSCOPY  04/12/2017    dr monique    ENDOSCOPY N/A 6/9/2022    Procedure: ESOPHAGOGASTRODUODENOSCOPY with biopsy;  Surgeon: Sean Monique MD;  Location: [facility] ENDOSCOPY;  Service: Gastroenterology;  Laterality: N/A;   "gastritis    ENDOSCOPY N/A 2024    Procedure: ESOPHAGOGASTRODUODENOSCOPY with biopsy;  Surgeon: Sean Oneil MD;  Location: [facility] ENDOSCOPY;  Service: Gastroenterology;  Laterality: N/A;  retained food, hiatal hernia, gastric polyp    EYE SURGERY      implant    NEPHRECTOMY PARTIAL Right 2014    robotic right partial nephrectomy w dr garvey    OTHER SURGICAL HISTORY      joint surgery    SALIVARY GLAND SURGERY      TONSILLECTOMY      TOTAL KNEE ARTHROPLASTY Right     UPPER GASTROINTESTINAL ENDOSCOPY  2020    Dr. Oneil    WRIST SURGERY Left        Allergies   Allergen Reactions    Codeine Rash              Family History   Problem Relation Age of Onset    Arthritis Mother     Arthritis Father     Cancer Father     Arthritis Sister     Arthritis Brother     Colon cancer Neg Hx     Malig Hyperthermia Neg Hx         Social History     Tobacco Use    Smoking status: Former     Current packs/day: 0.00     Average packs/day: 1 pack/day for 5.0 years (5.0 ttl pk-yrs)     Types: Cigarettes     Start date:      Quit date: 2000     Years since quittin.7    Smokeless tobacco: Never    Tobacco comments:     Less than 5 years   Vaping Use    Vaping status: Never Used   Substance Use Topics    Alcohol use: Never     Comment: drinks less than 1 drink per day     Drug use: Never       Objective     Vital Signs:   /50 (BP Location: Left arm, Patient Position: Sitting, Cuff Size: Adult)   Pulse 70   Ht 170.2 cm (67\")   Wt 102 kg (224 lb)   BMI 35.08 kg/m²       Physical Exam  Constitutional:       General: The patient is not in acute distress.     Appearance: Normal appearance.   HENT:      Head: Normocephalic and atraumatic.      Nose: Nose normal.   Pulmonary:      Effort: Pulmonary effort is normal. No respiratory distress.   Abdominal:      General: Abdomen is flat.      Palpations: Abdomen is soft. There is no mass.      Tenderness: There is no abdominal tenderness. There is no " guarding.   Musculoskeletal:      Cervical back: Neck supple.      Right lower leg: No edema.      Left lower leg: No edema.   Skin:     General: Skin is warm and dry.   Neurological:      General: No focal deficit present.      Mental Status: The patient is alert and oriented to person, place, and time.      Gait: Gait normal.   Psychiatric:         Mood and Affect: Mood normal.         Speech: Speech normal.         Behavior: Behavior normal.         Thought Content: Thought content normal.     Result Review :   The following data was reviewed by: LORENZO Paz on 10/03/2024:    CBC w/diff          9/4/2024    20:49 9/18/2024    08:49 10/1/2024    14:59   CBC w/Diff   WBC 7.65  7.28  5.02    RBC 3.89  4.03  3.87    Hemoglobin 11.8  12.1  11.7    Hematocrit 34.9  35.3  36.3    MCV 89.7  87.6  93.8    MCH 30.3  30.0  30.2    MCHC 33.8  34.3  32.2    RDW 13.1  13.1  13.5    Platelets 222  187  225    Neutrophil Rel % 73.2  71.3     Immature Granulocyte Rel % 0.4  0.4     Lymphocyte Rel % 19.2  20.6     Monocyte Rel % 6.3  7.3     Eosinophil Rel % 0.5  0.1     Basophil Rel % 0.4  0.3       CMP          9/4/2024    20:49 9/18/2024    08:49 10/1/2024    14:59   CMP   Glucose 149  155  72    BUN 17  14  15    Creatinine 1.19  1.11  0.92    EGFR 48.7  52.9  66.3    Sodium 140  138  140    Potassium 3.8  4.0  4.0    Chloride 105  103  105    Calcium 9.2  8.8  9.4    Total Protein 7.1  6.1  6.5    Albumin 3.8  3.4  3.9    Globulin 3.3  2.7  2.6    Total Bilirubin 0.6  0.7  0.6    Alkaline Phosphatase 92  80  86    AST (SGOT) 24  21  29    ALT (SGPT) 27  20  28    Albumin/Globulin Ratio 1.2  1.3  1.5    BUN/Creatinine Ratio 14.3  12.6  16.3    Anion Gap 9.2  13.9  8.0        Liver Workup   A-1 Antitrypsin   Date Value Ref Range Status   04/23/2020 145 101 - 187 mg/dL Final     ds DNA Antibody   Date Value Ref Range Status   04/23/2020 NEGATIVE [IU]/mL Final     Ceruloplasmin   Date Value Ref Range Status    04/23/2020 28.2 19.0 - 39.0 mg/dL Final     Ferritin   Date Value Ref Range Status   04/23/2020 201 (H) 10 - 200 ng/mL Final     Comment:     <10 ng/ml usually associated with Iron Deficiency Anemia.  Above normal range levels may be due to Hepatic and/or  Chronic Inflammatory Disease.       Immunofixation Result, Serum   Date Value Ref Range Status   04/23/2020 COMMENT NA Final     Comment:     No monoclonality detected.     IgA   Date Value Ref Range Status   04/23/2020 164 87 - 352 mg/dL Final     Iron   Date Value Ref Range Status   04/23/2020 58 (L) 60 - 170 ug/dL Final     TIBC   Date Value Ref Range Status   04/23/2020 399 245 - 450 ug/dL Final     Comment:     As of 10/15/03, the chemistry department began utilizing a Transferrin  assay. Data suggests that Transferrin is a better estimate of Total Iron  binding capacity than the TIBC assay. As a result the TIBC will now be a  calculated estimate from the measured Transferrin.       Iron Saturation   Date Value Ref Range Status   04/23/2020 15 (L) 20 - 55 % Final     Transferrin   Date Value Ref Range Status   04/23/2020 279.00 250.00 - 380.00 mg/dL Final     Mitochondrial Ab   Date Value Ref Range Status   04/23/2020 <20.0 0.0 - 20.0 Units Final     Comment:                                                    Negative    0.0 - 20.0                                                 Equivocal  20.1 - 24.9                                                 Positive         >24.9                 Mitochondrial (M2) Antibodies are found in 90-96% of                 patients with primary biliary cirrhosis.       Protime   Date Value Ref Range Status   08/12/2024 13.0 11.8 - 14.9 Seconds Final     INR   Date Value Ref Range Status   08/12/2024 0.96 0.86 - 1.15 Final     ALPHA-FETOPROTEIN   Date Value Ref Range Status   10/01/2024 <2 0 - 8.3 ng/mL Final     Acute HEP Panel   Hep A IgM   Date Value Ref Range Status   04/23/2020 Negative Negative NA Final     Hep B Core  IgM   Date Value Ref Range Status   04/23/2020 Negative Negative NA Final               Assessment and Plan    Diagnoses and all orders for this visit:    1. Irritable bowel syndrome with constipation (Primary)    2. Gastroparesis    3. Other cirrhosis of liver    4. Anemia, unspecified type    Other orders  -     Tenapanor HCl (Ibsrela) 50 MG tablet; Take 1 tablet by mouth 2 (Two) Times a Day.  Dispense: 60 tablet; Refill: 3              Follow Up   Return in about 6 months (around 4/3/2025).  May take Ibsrela BID , may D/C colace   Stool check for OB   Discussed with patient importance of small frequent meals, liquids over solids whenever possible. Recommended breakfast shake or soup for lunch as liquid alternatives as well as boost or ensure or other liquid nutritional supplement for snacks. Avoid high-fiber foods and high fat foods.  Liver US due in Nov - order already placed  Prefer not to give Zofran d/t QT prolongation with Lexapro     Patient was given instructions and counseling regarding her condition or for health maintenance advice. Please see specific information pulled into the AVS if appropriate.

## 2024-10-07 ENCOUNTER — TELEPHONE (OUTPATIENT)
Dept: GASTROENTEROLOGY | Facility: CLINIC | Age: 74
End: 2024-10-07
Payer: MEDICARE

## 2024-10-07 NOTE — TELEPHONE ENCOUNTER
----- Message from Chloe Euceda sent at 10/1/2024  3:27 PM EDT -----  EGD 9/27/2020: Small hiatal hernia, normal esophagus, 2 small polyps were found in the stomach-biopsy shows gastropathy and hyperplastic changes otherwise negative, small amount of food found in the stomach body, normal duodenum  Please ensure no NSAIDs  Patient has follow-up with me in 2 days

## 2024-10-07 NOTE — TELEPHONE ENCOUNTER
Results were discussed with patient at office visit on 10.03.24.    Follow up appointment with LORENZO Powell on 04.03.25 at 0945.

## 2024-10-14 NOTE — TELEPHONE ENCOUNTER
"    Caller: Sowmya Hodges \"Alecia\"    Relationship: Self    Best call back number: [phone]    Requested Prescriptions:   Requested Prescriptions     Pending Prescriptions Disp Refills    busPIRone (BUSPAR) 5 MG tablet 90 tablet 1     Sig: Take 1 tablet by mouth 3 (Three) Times a Day.    escitalopram (LEXAPRO) 20 MG tablet 90 tablet 1     Sig: Take 1 tablet by mouth Daily.    lisinopril (PRINIVIL,ZESTRIL) 2.5 MG tablet 90 tablet 1     Sig: Take 1 tablet by mouth Daily.        Pharmacy where request should be sent: [facility]/PHARMACY #[id number] - [place] - [address] [facility] [phone] [facility] [phone] FX     Last office visit with prescribing clinician: 10/2/2024   Last telemedicine visit with prescribing clinician: Visit date not found   Next office visit with prescribing clinician: 1/2/2025     Additional details provided by patient:     Does the patient have less than a 3 day supply:  [x] Yes  [] No    Would you like a call back once the refill request has been completed: [] Yes [] No    If the office needs to give you a call back, can they leave a voicemail: [] Yes [] No    Mounika Priest Rep   10/14/24 11:31 EDT           "

## 2024-10-14 NOTE — TELEPHONE ENCOUNTER
Patient requesting refill for Klonopin 1 MG for anxiety. Prescription discontinued on 10/03/24 .     Last follow up visit date: 10/2/24    Last urine drug screen date: 4/5/23    Last consent/contract date: 3/7/24    Does patient utilize [facility] pharmacy (yes or no)?

## 2024-10-14 NOTE — TELEPHONE ENCOUNTER
Please tell patient that I would recommend evaluation if she has had a cough for a few weeks.  Also ask if she has been taking the BuSpar for anxiety?  We talked about switching the clonazepam to the BuSpar instead at her last appointment.

## 2024-10-14 NOTE — TELEPHONE ENCOUNTER
"    Caller: Sowmya Hodges \"Alecia\"    Relationship: Self    Best call back number: [phone]     What medication are you requesting: AGA BILLINGSLEY     What are your current symptoms: DEEP COUGH     How long have you been experiencing symptoms: COUPLE OF WEEKS     Have you had these symptoms before:    [x] Yes  [] No    Have you been treated for these symptoms before:   [x] Yes  [] No    If a prescription is needed, what is your preferred pharmacy and phone number: [facility]/PHARMACY #[id number] - [place] - [address] [facility] [phone] [facility] [phone] FX                 "

## 2024-10-14 NOTE — TELEPHONE ENCOUNTER
PATIENT STATES SHE FORGOT TO ADD THE   clonazePAM (KlonoPIN) 1 MG tablet [9227] (Order [id number])     PLEASE SEND IN WITH THE OTHER MEDICATIONS.

## 2024-10-15 RX ORDER — LISINOPRIL 2.5 MG/1
2.5 TABLET ORAL DAILY
Qty: 90 TABLET | Refills: 1 | Status: CANCELLED | OUTPATIENT
Start: 2024-10-15

## 2024-10-15 RX ORDER — ESCITALOPRAM OXALATE 20 MG/1
20 TABLET ORAL DAILY
Qty: 90 TABLET | Refills: 1 | Status: CANCELLED | OUTPATIENT
Start: 2024-10-15

## 2024-10-15 RX ORDER — BUSPIRONE HYDROCHLORIDE 5 MG/1
5 TABLET ORAL 3 TIMES DAILY
Qty: 90 TABLET | Refills: 1 | OUTPATIENT
Start: 2024-10-15

## 2024-10-17 ENCOUNTER — TELEPHONE (OUTPATIENT)
Dept: INTERNAL MEDICINE | Facility: CLINIC | Age: 74
End: 2024-10-17
Payer: MEDICARE

## 2024-10-17 DIAGNOSIS — F41.1 GENERALIZED ANXIETY DISORDER: Primary | ICD-10-CM

## 2024-10-17 NOTE — TELEPHONE ENCOUNTER
"  Caller: Sowmya Hodges \"Alecia\"    Relationship: Self    Best call back number: [phone]     What is the medical concern/diagnosis: DR HENDERSON PUT IN A STINT DUE TO KIDNEYS BEING SWOLLEN, PATIENT NOW NEEDING TO SEE KIDNEY SPECIALIST    What specialty or service is being requested: NEPHROLOGIST    What is the provider, practice or medical service name: DR. GARETT MARK    What is the office location: [address] [place] [address]    What is the office phone number: [phone]      "
Call pt lv to call us back  
It looks like patient has a follow-up with Dr. Butts 10-25.  Typically they will put in the referral to nephrology if indicated at that time.  I would recommend she keep that appointment to discuss if the referral was necessary.  
Pt return call to the office, explained to pt she should discuss the referral for nephrology at her upcoming visit with urology. Pt verbalized understanding and had no further questions at that time   
Normal rate, regular rhythm, normal S1, S2 heart sounds heard.

## 2024-10-17 NOTE — TELEPHONE ENCOUNTER
I can if you think that is the best medicine for her.  For anxiety ?0.5mg bid or sleep? Or something else?  If I prescribed it it has been awhile.  I don't remember ever seeing her.

## 2024-10-17 NOTE — TELEPHONE ENCOUNTER
"  Caller: Sowmya Hodges \"Alecia\"    Relationship: Self    Best call back number: [phone]    Requested Prescriptions:   [facility]        Pharmacy where request should be sent: [facility]/PHARMACY #[id number] - [place], [place] - [address] [facility] - [phone]  - [phone] FX     Last office visit with prescribing clinician: 10/2/2024   Last telemedicine visit with prescribing clinician: Visit date not found   Next office visit with prescribing clinician: 1/2/2025     Additional details provided by patient:     Does the patient have less than a 3 day supply:  [x] Yes  [] No        Mounika Diaz Rep   10/17/24 11:30 EDT         "

## 2024-10-18 ENCOUNTER — TELEPHONE (OUTPATIENT)
Dept: INTERNAL MEDICINE | Facility: CLINIC | Age: 74
End: 2024-10-18
Payer: MEDICARE

## 2024-10-18 ENCOUNTER — HOSPITAL ENCOUNTER (OUTPATIENT)
Dept: MAMMOGRAPHY | Facility: HOSPITAL | Age: 74
Discharge: HOME | End: 2024-10-18
Admitting: PHYSICIAN ASSISTANT
Payer: MEDICARE

## 2024-10-18 DIAGNOSIS — Z12.31 ENCOUNTER FOR SCREENING MAMMOGRAM FOR BREAST CANCER: ICD-10-CM

## 2024-10-18 PROCEDURE — 77063 BREAST TOMOSYNTHESIS BI: CPT

## 2024-10-18 PROCEDURE — 77067 SCR MAMMO BI INCL CAD: CPT

## 2024-10-18 RX ORDER — HYDROXYZINE PAMOATE 25 MG/1
25 CAPSULE ORAL 3 TIMES DAILY PRN
Qty: 90 CAPSULE | Refills: 0 | Status: SHIPPED | OUTPATIENT
Start: 2024-10-18

## 2024-10-18 NOTE — TELEPHONE ENCOUNTER
To reviewing her Pasquale, it looks like she is doing the oxycodone 3 times daily.  It is not recommended to use a benzodiazepine at the same time as a narcotic pain medication due to risks.  Please see if she would like to increase her BuSpar dose to see if that helps or would like to try hydroxyzine as needed for anxiety?  I have also put in a referral for the psychiatrist to discuss this further.

## 2024-10-18 NOTE — TELEPHONE ENCOUNTER
"Caller: Sowmya Hodges \"Alecia\"    Relationship: Self    Best call back number: [phone]     Requested Prescriptions:   Proxly 2.5    Pharmacy where request should be sent: [facility]/PHARMACY #[id number] - [place], [place] - [address] - [phone]  - [phone] FX     Last office visit with prescribing clinician: 10/2/2024   Last telemedicine visit with prescribing clinician: Visit date not found   Next office visit with prescribing clinician: 1/2/2025     PATIENT STATES THAT THE MEDICATION WAS ORIGINALLY PRESCRIBED BY DR BURRIS BUT SHE IS OUT OF THE MEDICATION AND NEEDS A REFILL    Does the patient have less than a 3 day supply:  [x] Yes  [] No    Would you like a call back once the refill request has been completed: [x] Yes [] No    If the office needs to give you a call back, can they leave a voicemail: [x] Yes [] No    Mounika De Santiago Rep   10/18/24 08:00 EDT   "

## 2024-10-22 ENCOUNTER — TELEPHONE (OUTPATIENT)
Dept: INTERNAL MEDICINE | Facility: CLINIC | Age: 74
End: 2024-10-22
Payer: MEDICARE

## 2024-10-22 NOTE — TELEPHONE ENCOUNTER
"Caller: Sowmya Hodges \"Alecia\"    Relationship: Self    Best call back number: [phone]     Which medication are you concerned about:     hydrOXYzine pamoate (VISTARIL) 25 MG capsule       Who prescribed you this medication: CHELSEA GARCIA    When did you start taking this medication:     What are your concerns: PATIENT STATES THE PHARMACIST STATES SHE SHOULDN'T TAKE THIS WITH THE LEXAPRO. NEEDS SOMETHING ELSE CALLED IN    PLEASE CALL AND ADVISE    How long have you had these concerns:   "

## 2024-10-23 NOTE — TELEPHONE ENCOUNTER
"Call pt lv to call us back    Relay     \"Per Tamara Singer:   It is safe to take with lexapro   \"                 "

## 2024-10-23 NOTE — TELEPHONE ENCOUNTER
"Name: Sowmya Hodges \"Alecia\"    Relationship: Self    Best Callback Number: [phone]     HUB PROVIDED THE RELAY MESSAGE FROM THE OFFICE   PATIENT VOICED UNDERSTANDING AND HAS NO FURTHER QUESTIONS AT THIS TIME  "

## 2024-10-23 NOTE — TELEPHONE ENCOUNTER
"Caller: Sowmya Hodges \"Alecia\"    Relationship: Self    Best call back number: [phone]     Requested Prescriptions:   Requested Prescriptions     Pending Prescriptions Disp Refills    SITagliptin (Januvia) 100 MG tablet 30 tablet 5     Sig: Take 1 tablet by mouth Daily.        Pharmacy where request should be sent: [facility]/PHARMACY #[id number] - [place], [place] - [address] [facility] [phone] [facility] [phone] FX     Last office visit with prescribing clinician: 10/2/2024   Last telemedicine visit with prescribing clinician: Visit date not found   Next office visit with prescribing clinician: 1/2/2025     Does the patient have less than a 3 day supply:  [x] Yes  [] No    Would you like a call back once the refill request has been completed: [x] Yes [] No    If the office needs to give you a call back, can they leave a voicemail: [x] Yes [] No    Mounika De Santiago Rep   10/23/24 10:16 EDT     "

## 2024-10-25 ENCOUNTER — PROCEDURE VISIT (OUTPATIENT)
Dept: UROLOGY | Facility: CLINIC | Age: 74
End: 2024-10-25
Payer: MEDICARE

## 2024-10-25 ENCOUNTER — POP HEALTH PHARMACY (OUTPATIENT)
Dept: PHARMACY | Facility: OTHER | Age: 74
End: 2024-10-25
Payer: MEDICARE

## 2024-10-25 VITALS
DIASTOLIC BLOOD PRESSURE: 64 MMHG | WEIGHT: 224 LBS | HEIGHT: 67 IN | SYSTOLIC BLOOD PRESSURE: 148 MMHG | BODY MASS INDEX: 35.16 KG/M2

## 2024-10-25 DIAGNOSIS — N13.39 OTHER HYDRONEPHROSIS: ICD-10-CM

## 2024-10-25 DIAGNOSIS — C64.1 RENAL CELL ADENOCARCINOMA, RIGHT: Primary | ICD-10-CM

## 2024-10-25 LAB
BILIRUB BLD-MCNC: NEGATIVE MG/DL
CLARITY, POC: CLEAR
COLOR UR: YELLOW
EXPIRATION DATE: ABNORMAL
GLUCOSE UR STRIP-MCNC: ABNORMAL MG/DL
KETONES UR QL: NEGATIVE
LEUKOCYTE EST, POC: NEGATIVE
Lab: ABNORMAL
NITRITE UR-MCNC: NEGATIVE MG/ML
PH UR: 7 [PH] (ref 5–8)
PROT UR STRIP-MCNC: NEGATIVE MG/DL
RBC # UR STRIP: ABNORMAL /UL
SP GR UR: 1.01 (ref 1–1.03)
UROBILINOGEN UR QL: ABNORMAL

## 2024-10-25 NOTE — PROGRESS NOTES
Stent Removal    Date/Time: 10/25/2024 11:57 AM    Performed by: Modesta Butts MD  Authorized by: Modesta Butts MD  Preparation: Patient was prepped and draped in the usual sterile fashion.  Local anesthesia used: no    Anesthesia:  Local anesthesia used: no    Sedation:  Patient sedated: no    Patient tolerance: patient tolerated the procedure well with no immediate complications      After proper consent was obtained patient was placed into the supine position.  Flexible cystoscope was passed into the bladder.  The left ureteral stent was grasped and removed.  The cystoscope was then removed.  Patient tolerated the procedure well.      She will have a renal ultrasound in about a month and will see me in 4 to 6 weeks.

## 2024-10-30 RX ORDER — DAPAGLIFLOZIN 10 MG/1
1 TABLET, FILM COATED ORAL DAILY
Qty: 90 TABLET | Refills: 0 | Status: SHIPPED | OUTPATIENT
Start: 2024-10-30

## 2024-11-01 ENCOUNTER — HOSPITAL ENCOUNTER (OUTPATIENT)
Dept: ULTRASOUND IMAGING | Facility: HOSPITAL | Age: 74
Discharge: HOME | End: 2024-11-01
Payer: MEDICARE

## 2024-11-01 DIAGNOSIS — K74.69 OTHER CIRRHOSIS OF LIVER: ICD-10-CM

## 2024-11-01 PROCEDURE — 76705 ECHO EXAM OF ABDOMEN: CPT

## 2024-11-01 RX ORDER — ATORVASTATIN CALCIUM 20 MG/1
20 TABLET, FILM COATED ORAL DAILY
Qty: 90 TABLET | Refills: 1 | Status: SHIPPED | OUTPATIENT
Start: 2024-11-01

## 2024-11-05 ENCOUNTER — TELEPHONE (OUTPATIENT)
Dept: UROLOGY | Age: 74
End: 2024-11-05
Payer: MEDICARE

## 2024-11-05 NOTE — TELEPHONE ENCOUNTER
Left message asking for patient to call and schedule renal US 5-7 days prior to appointment on 11/25/24. I also reached out to sister who is going to contact sister and have patient call to schedule the US.

## 2024-11-07 ENCOUNTER — POP HEALTH PHARMACY (OUTPATIENT)
Dept: PHARMACY | Facility: OTHER | Age: 74
End: 2024-11-07
Payer: MEDICARE

## 2024-11-16 DIAGNOSIS — M65.332 TRIGGER FINGER, LEFT MIDDLE FINGER: ICD-10-CM

## 2024-11-18 RX ORDER — DICLOFENAC SODIUM 75 MG/1
75 TABLET, DELAYED RELEASE ORAL 2 TIMES DAILY
Qty: 60 TABLET | Refills: 1 | Status: SHIPPED | OUTPATIENT
Start: 2024-11-18

## 2024-11-21 RX ORDER — DAPAGLIFLOZIN 10 MG/1
1 TABLET, FILM COATED ORAL DAILY
Qty: 90 TABLET | Refills: 0 | Status: SHIPPED | OUTPATIENT
Start: 2024-11-21

## 2024-11-25 ENCOUNTER — TELEPHONE (OUTPATIENT)
Dept: UROLOGY | Age: 74
End: 2024-11-25

## 2024-11-25 RX ORDER — BUSPIRONE HYDROCHLORIDE 15 MG/1
15 TABLET ORAL 3 TIMES DAILY PRN
Qty: 270 TABLET | Refills: 0 | OUTPATIENT
Start: 2024-11-25

## 2024-12-16 ENCOUNTER — OFFICE VISIT (OUTPATIENT)
Dept: BEHAVIORAL HEALTH | Facility: CLINIC | Age: 74
End: 2024-12-16
Payer: MEDICARE

## 2024-12-16 VITALS
BODY MASS INDEX: 37.67 KG/M2 | HEIGHT: 67 IN | DIASTOLIC BLOOD PRESSURE: 74 MMHG | HEART RATE: 75 BPM | WEIGHT: 240 LBS | SYSTOLIC BLOOD PRESSURE: 128 MMHG

## 2024-12-16 DIAGNOSIS — F33.1 MODERATE EPISODE OF RECURRENT MAJOR DEPRESSIVE DISORDER: Primary | ICD-10-CM

## 2024-12-16 DIAGNOSIS — F41.1 GENERALIZED ANXIETY DISORDER: ICD-10-CM

## 2024-12-16 PROCEDURE — 90792 PSYCH DIAG EVAL W/MED SRVCS: CPT

## 2024-12-16 PROCEDURE — 3078F DIAST BP <80 MM HG: CPT

## 2024-12-16 PROCEDURE — 1159F MED LIST DOCD IN RCRD: CPT

## 2024-12-16 PROCEDURE — 1160F RVW MEDS BY RX/DR IN RCRD: CPT

## 2024-12-16 PROCEDURE — 3074F SYST BP LT 130 MM HG: CPT

## 2024-12-16 RX ORDER — BACLOFEN 10 MG/1
1 TABLET ORAL 2 TIMES DAILY
COMMUNITY
Start: 2024-10-22

## 2024-12-16 RX ORDER — CLONAZEPAM 1 MG/1
1 TABLET ORAL
COMMUNITY
End: 2024-12-16

## 2024-12-16 RX ORDER — DEXTROMETHORPHAN HYDROBROMIDE AND PROMETHAZINE HYDROCHLORIDE 15; 6.25 MG/5ML; MG/5ML
2.5 SYRUP ORAL
COMMUNITY

## 2024-12-16 RX ORDER — SEMAGLUTIDE 1.34 MG/ML
INJECTION, SOLUTION SUBCUTANEOUS
COMMUNITY

## 2024-12-16 RX ORDER — TIZANIDINE 2 MG/1
2 TABLET ORAL
COMMUNITY
End: 2024-12-16

## 2024-12-16 RX ORDER — ONDANSETRON 4 MG/1
4 TABLET, ORALLY DISINTEGRATING ORAL
COMMUNITY

## 2024-12-16 RX ORDER — BENZONATATE 100 MG/1
100 CAPSULE ORAL
COMMUNITY

## 2024-12-16 NOTE — PATIENT INSTRUCTIONS
1.  Please return to clinic at your next scheduled visit.  Please contact the clinic ([phone]) at least 24 hours prior in the event you need to cancel.  2.  Do no harm to yourself or others.    3.  Avoid alcohol and drugs.    4.  Take all medications as prescribed.  Please contact the clinic with any concerns. If you are in need of medication refills, please call the clinic at [phone].    5. Should you want to get in touch with your provider, LORENZO Padgett, please contact the office ([phone]), and staff will be able to page Coty directly.  6. In the event you have personal crisis, contact the following crisis numbers: Suicide Prevention Hotline 1-736.496.6604; LINDA Helpline 7-150-080-CZCA; [facility] Emergency Room [phone]; text HELLO to 873429; or 250.     SPECIFIC RECOMMENDATIONS:     1.      Medications discussed at this encounter:     No orders of the defined types were placed in this encounter.                      2.      Psychotherapy recommendations: We will continue therapy at future visits.     3.     Return to clinic: Return if symptoms worsen or fail to improve.

## 2024-12-16 NOTE — PROGRESS NOTES
"[facility]  Initial Psychiatric Evaluation    Referring Provider:   Thank you   Tamara Singer PA-C  [address] [facility] [address]  [address] [address]  Your referral is greatly appreciated.    Vital Signs:   /74   Pulse 75   Ht 170.2 cm (67.01\")   Wt 109 kg (240 lb)   BMI 37.58 kg/m²      Chief Complaint: Depression. Anxiety.     History of Present Illness:   Sowmya Hodges is a 74 y.o. female who presents today for initial psychiatric evaluation for:     ICD-10-CM ICD-9-CM   1. Moderate episode of recurrent major depressive disorder  F33.1 296.32   2. Generalized anxiety disorder  F41.1 300.02       12/16/2024   Depression  Patient reports feeling depressed since her divorce in 1988, has been single ever since. Patient endorses gradually worsening feelings of sadness, low mood, and loss of interest in usual activities. These feelings are accompanied by anhedonia, change in appetite, losing or gaining weight, sleeping too much or not sleeping well (insomnia), fatigue and low energy most days, feeling worthless, guilty, and hopeless. Describes an inability to focus and concentrate that may interfere with daily tasks at home, work, or school. Movements that are unusually slow or agitated (a change which is often noticeable to others). Denies thinking about death and dying, suicidal ideation, planning, or intent to self-harm. These symptoms cause the patient clinically significant distress or impairment in social, occupational, or other important areas of functioning.  PHQ-9 is 10.  Generalized Anxiety Disorder (KADE)   Reports some situational anxiety about her vehicle, financial issues. Patient experiences excessive anxiety and worry (apprehensive expectation), occurring more days than not for at least 6 months, about a number of events or activities (such as work or school performance). Finds it difficult to control the worry. The anxiety and worry are associated with restlessness or " feeling keyed up or on edge, being easily fatigued, difficulty concentrating or mind going blank, irritability, muscle tension, and sleep disturbance (difficulty falling or staying asleep, or restless, unsatisfying sleep). The anxiety, worry, or physical symptoms cause clinically significant distress or impairment in social, occupational, or other important areas of functioning.   KADE-7 is 6.  Panic Attack  The current episode started more than 1 year ago. The problem occurs intermittently, patient previously was taking clonazepam for these episodes, however, after discussion of risks while taking concurrently with opiates we are in agreement that we will try to avoid this medication if at all possible.  Patient reports that hydroxyzine is currently helping with her anxiety and she will let me know if she thinks that we need a rescue quantity of clonazepam in the future. The problem has been gradually improving. Associated symptoms include sense of impending doom, unbearable foreboding that something terrible is going to happen, intense fear of losing control, palpitations, racing/pounding heartbeat, chest discomfort, shortness of breath, choking sensation, detachment, depersonalization, dissociation, excessive perspiration, derealization, trembling/shaking, nausea, abdominal distress, numbness or tingling sensations, and heat sensations.       Per Referring Provider 10/17/2024 To reviewing her Pasquale, it looks like she is doing the oxycodone 3 times daily. It is not recommended to use a benzodiazepine at the same time as a narcotic pain medication due to risks. Please see if she would like to increase her BuSpar dose to see if that helps or would like to try hydroxyzine as needed for anxiety? I have also put in a referral for the psychiatrist to discuss this further.     Past Psychiatric History:  Began Treatment: Denies  Diagnoses: Denies  Psychiatrist: Denies  Therapist: Denies  Admission History:  Denies  Medication Trials: hydroxyzine, trazodone, buspar, lexapro, clonazepam  Family history suicide attempts: Denies  Family history suicide completions: Denies  Suicide Attempts: Denies  Self Harm: Denies  Substance use/abuse: Denies  Withdrawal Symptoms: Not applicable  Longest Period Sober: Not applicable  AA: Not applicable  Trauma/Childhood/ACE: Head injury as a young child, open heart surgery age 4, polio age 5   Access to Firearms: Denies    Safety/Risk Assessment: Risk of self-harm acutely and chronically is mild to moderate.    Static/Dynamic risk factors include diagnosis of mental disorder, psychosocial stressors,Recent stressor or loss and Social factors.    Record Review for 12/16/2024 : I have thoroughly reviewed the patient's electronic medical record to include previous encounters, care everywhere, notes, medications, labs, SHAMIR and UDS, imaging, and EKG's.  Pertinent information is included in this note.  10/1/2024 hemoglobin 11.7, CBC and CMP otherwise reassuring  EKG Results:  No current or pertinent labs in record  Head Imaging:  None in record    MENTAL STATUS EXAM   General Appearance:  Cleanly groomed and dressed and well developed  Eye Contact:  Good eye contact  Attitude:  Cooperative and polite  Motor Activity:  Normal gait, posture  Speech:  Normal rate, tone, volume  Mood and affect:  Normal, pleasant and euthymic  Hopelessness:  Denies  Thought Process:  Logical and goal-directed  Associations/ Thought Content:  No delusions  Hallucinations:  None  Suicidal Ideations:  Not present  Homicidal Ideation:  Not present  Sensorium:  Alert  Orientation:  Person, place, time and situation  Immediate Recall, Recent, and Remote Memory:  Intact  Attention Span/ Concentration:  Good  Fund of Knowledge:  Appropriate for age and educational level  Intellectual Functioning:  Average range  Insight:  Good  Judgement:  Good  Reliability:  Good  Impulse Control:  Good     PHQ-9 Depression  Screening  PHQ-9 Total Score: 10    Little interest or pleasure in doing things? Several days   Feeling down, depressed, or hopeless? Over half   PHQ-2 Total Score 3   Trouble falling or staying asleep, or sleeping too much? Almost all   Feeling tired or having little energy? Several days   Poor appetite or overeating? Over half   Feeling bad about yourself - or that you are a failure or have let yourself or your family down? Several days   Trouble concentrating on things, such as reading the newspaper or watching television? Not at all   Moving or speaking so slowly that other people could have noticed? Or the opposite - being so fidgety or restless that you have been moving around a lot more than usual? Not at all   Thoughts that you would be better off dead, or of hurting yourself in some way? Not at all   PHQ-9 Total Score 10   If you checked off any problems, how difficult have these problems made it for you to do your work, take care of things at home, or get along with other people? Not difficult at all       KADE-7  Feeling nervous, anxious or on edge: Several days  Not being able to stop or control worrying: Several days  Worrying too much about different things: Several days  Trouble Relaxing: Several days  Being so restless that it is hard to sit still: Several days  Feeling afraid as if something awful might happen: Not at all  Becoming easily annoyed or irritable: Several days  KADE 7 Total Score: 6  Review of systems is negative except for as noted in HPI.  Labs:  WBC   Date Value Ref Range Status   10/01/2024 5.02 3.40 - 10.80 10*3/mm3 Final     Platelets   Date Value Ref Range Status   10/01/2024 225 140 - 450 10*3/mm3 Final     Hemoglobin   Date Value Ref Range Status   10/01/2024 11.7 (L) 12.0 - 15.9 g/dL Final     Hematocrit   Date Value Ref Range Status   10/01/2024 36.3 34.0 - 46.6 % Final     Glucose   Date Value Ref Range Status   10/01/2024 72 65 - 99 mg/dL Final     Creatinine   Date Value Ref  Range Status   10/01/2024 0.92 0.57 - 1.00 mg/dL Final     ALT (SGPT)   Date Value Ref Range Status   10/01/2024 28 1 - 33 U/L Final     AST (SGOT)   Date Value Ref Range Status   10/01/2024 29 1 - 32 U/L Final     BUN   Date Value Ref Range Status   10/01/2024 15 8 - 23 mg/dL Final     eGFR   Date Value Ref Range Status   10/01/2024 66.3 >60.0 mL/min/1.73 Final     Total Cholesterol   Date Value Ref Range Status   08/12/2024 144 0 - 200 mg/dL Final     Triglycerides   Date Value Ref Range Status   08/12/2024 102 0 - 150 mg/dL Final     HDL Cholesterol   Date Value Ref Range Status   08/12/2024 52 40 - 60 mg/dL Final     LDL Cholesterol    Date Value Ref Range Status   08/12/2024 73 0 - 100 mg/dL Final     VLDL Cholesterol   Date Value Ref Range Status   08/12/2024 19 5 - 40 mg/dL Final     LDL/HDL Ratio   Date Value Ref Range Status   08/12/2024 1.38  Final     Hemoglobin A1C   Date Value Ref Range Status   08/12/2024 6.00 (H) 4.80 - 5.60 % Final     TSH   Date Value Ref Range Status   08/12/2024 1.150 0.270 - 4.200 uIU/mL Final     Free T4   Date Value Ref Range Status   10/06/2020 0.9 0.9 - 1.8 ng/dL Final     Last Urine Toxicity  More data exists         Latest Ref Rng & Units 4/5/2023 6/3/2022   LAST URINE TOXICITY RESULTS   Amphetamine, Urine Qual Negative Negative  Negative    Barbiturates Screen, Urine Negative Negative  Negative    Benzodiazepine Screen, Urine Negative Negative  Negative    Buprenorphine, Screen, Urine Negative Negative  Negative    Cocaine Screen, Urine Negative Negative  Negative    Methadone Screen , Urine Negative Negative  Negative    Methamphetamine, Ur Negative Negative  Negative       Details                  Imaging Results:  US Liver    Result Date: 11/4/2024  Impression: 1. Findings compatible with cirrhosis. Electronically Signed: Chinyere Cabrera MD  11/4/2024 1:16 PM EST  Workstation ID: [id number]    Mammo Screening Digital Tomosynthesis Bilateral With CAD    Result Date:  10/18/2024  No mammographic evidence of malignancy.  Recommend annual screening mammography.    BI-RADS ASSESSMENT: BI-RADS 2. Benign findings.  Note:  It has been reported that there is approximately a 15% false negative rate in mammography.  Therefore, management of a palpable abnormality should not be deferred because of a negative mammogram.  Electronically Signed By-RANDOLPH CASILLAS MD On:10/18/2024 12:07 PM      CT Abdomen Pelvis With Contrast    Result Date: 9/18/2024  Impression: Severe left hydronephrosis with abrupt change in caliber at the UPJ showing increased as compared to the recent prior exam. There is also interval development of extensive left perinephric fat stranding and fluid along the left anterior pararenal space compatible with forniceal rupture. Findings are compatible with an UPJ obstruction of uncertain cause. Urgent urology evaluation recommended Electronically Signed: Charlie Espino MD  9/18/2024 10:41 AM EDT  Workstation ID: [id number]    CT Abdomen Pelvis With Contrast    Result Date: 9/5/2024  1. Interval development of left hydronephrosis without hydroureter. No obstructing lesion is seen. This is concerning for a UPJ stenosis although this could potentially be secondary to a recently passed stone. There is diminished enhancement in the left kidney as a result. Additionally, there is enhancement of the left ureter and left renal urothelium that could reflect ascending urinary tract infection. 2. Postoperative changes in the right kidney. 3. No acute findings in the GI tract. 4. Cholecystectomy without biliary obstruction. 5. Additional nonacute findings as above. Electronically Signed: Jason Saba MD  9/5/2024 12:34 AM EDT  Workstation ID: [id number]    XR Abdomen KUB    Result Date: 9/4/2024  Impression: Nonspecific bowel gas pattern without evidence of high-grade obstruction. Few mildly dilated loops of small bowel in the central abdomen are nonspecific. Moderate to large formed  colonic stool, correlate for constipation. Electronically Signed: Maverick Bowman MD  9/4/2024 9:27 PM EDT  Workstation ID: [id number]    Allergy:   Allergies   Allergen Reactions    Codeine Rash             Problem List:  Patient Active Problem List   Diagnosis    Type 2 diabetes mellitus with hyperglycemia, without long-term current use of insulin    Depression    Gastroesophageal reflux disease    Hyperlipidemia    Renal cell adenocarcinoma, right    Vertigo    Anxiety    Vitamin D deficiency    Chronic bilateral low back pain with bilateral sciatica    Allergic rhinitis    Nonintractable epilepsy without status epilepticus    Abdominal cramps    Diarrhea    Insomnia    Lymphadenopathy    Anemia    Arthritis    Degeneration of lumbar intervertebral disc    Diabetic peripheral neuropathy    Diverticulitis    Heart disease    HTN (hypertension)    Lumbar spondylosis    TAYLOR (obstructive sleep apnea)    Overweight    Polyp, sinus maxillary    Other cirrhosis of liver    Generalized anxiety disorder    Stenosis of ureteropelvic junction (UPJ)    Hydronephrosis     Current Medications:   Current Outpatient Medications   Medication Sig Dispense Refill    atorvastatin (LIPITOR) 20 MG tablet TAKE 1 TABLET BY MOUTH EVERY DAY 90 tablet 1    baclofen (LIORESAL) 10 MG tablet Take 1 tablet by mouth 2 (Two) Times a Day.      benzonatate (TESSALON) 100 MG capsule Take 1 capsule by mouth.      Blood Glucose Monitoring Suppl (Accu-Chek Sabrina Plus) w/Device kit 1 kit Take As Directed. To checke blood sugar up to 3 times a day. DX E11.9 1 kit 0    busPIRone (BUSPAR) 5 MG tablet Take 1 tablet by mouth 3 (Three) Times a Day. 90 tablet 1    diclofenac (VOLTAREN) 75 MG EC tablet TAKE 1 TABLET BY MOUTH TWICE A DAY 60 tablet 1    dicyclomine (BENTYL) 20 MG tablet Take 1 po before meals and at bedtimes as needed for cramping 90 tablet 0    docusate sodium (COLACE) 100 MG capsule Take 1 capsule by mouth 2 (Two) Times a Day.      escitalopram  (LEXAPRO) 20 MG tablet Take 1 tablet by mouth Daily. 90 tablet 1    Farxiga 10 MG tablet TAKE 1 TABLET BY MOUTH EVERY DAY 90 tablet 0    fluticasone (FLONASE) 50 MCG/ACT nasal spray USE 1-2 SPRAYS IN EACH NOSTRIL ONCE DAILY AS NEEDED 48 mL 1    hydrOXYzine pamoate (VISTARIL) 25 MG capsule Take 1 capsule by mouth 3 (Three) Times a Day As Needed for Anxiety. 90 capsule 0    Insulin Pen Needle (Pen Needles) 32G X 4 MM misc 1 each 1 (One) Time Per Week. 90 each 1    levETIRAcetam (KEPPRA) 750 MG tablet TAKE 1 TABLET BY MOUTH TWICE A  tablet 1    levocetirizine (XYZAL) 5 MG tablet TAKE 1 TABLET BY MOUTH EVERY DAY IN THE EVENING 90 tablet 1    lidocaine (LIDODERM) 5 % Place 1 patch on the skin as directed by provider Daily.      linaclotide (Linzess) 145 MCG capsule capsule Take 1 capsule by mouth Daily.      lisinopril (PRINIVIL,ZESTRIL) 2.5 MG tablet Take 1 tablet by mouth Daily. 90 tablet 1    ondansetron ODT (ZOFRAN-ODT) 4 MG disintegrating tablet Take 1 tablet by mouth.      oxyCODONE-acetaminophen (PERCOCET) 7.5-325 MG per tablet Take 1 tablet every 8 hours by oral route as needed for 30 days.      pantoprazole (PROTONIX) 40 MG EC tablet Take 1 tablet by mouth Daily. 90 tablet 1    promethazine-dextromethorphan (PROMETHAZINE-DM) 6.25-15 MG/5ML syrup Take 2.5 mL by mouth.      Semaglutide, 1 MG/DOSE, (Ozempic, 1 MG/DOSE,) 4 MG/3ML solution pen-injector INJECT 1MG UNDER THE SKIN INTO THE APPROPRIATE AREA AS DIRECTED EVERY 7 DAYS      SITagliptin (Januvia) 100 MG tablet Take 1 tablet by mouth Daily. 30 tablet 5    Tenapanor HCl (Ibsrela) 50 MG tablet Take 1 tablet by mouth 2 (Two) Times a Day. 60 tablet 3    traZODone (DESYREL) 50 MG tablet TAKE 1 TABLET BY MOUTH EVERYDAY AT BEDTIME 90 tablet 1    vitamin D3 125 MCG (5000 UT) capsule capsule Take 1 capsule by mouth Daily.       No current facility-administered medications for this visit.     Discontinued Medications:  Medications Discontinued During This  Encounter   Medication Reason    tiZANidine (ZANAFLEX) 2 MG tablet Patient Reported Not Taking    clonazePAM (KlonoPIN) 1 MG tablet Patient Reported Not Taking     Past Surgical History:  Past Surgical History:   Procedure Laterality Date    CARDIAC SURGERY      open heart at age 4     CARDIAC VALVE SURGERY      CHOLECYSTECTOMY      COLONOSCOPY  04/12/2017    dr monique    COLONOSCOPY N/A 1/17/2023    Procedure: COLONOSCOPY WITH BIOPSIES, POLYPECTOMY;  Surgeon: Sean Monique MD;  Location: [facility] ENDOSCOPY;  Service: Gastroenterology;  Laterality: N/A;  COLON POLYP    CYSTOSCOPY, URETEROSCOPY, RETROGRADE PYELOGRAM, STENT INSERTION Left 9/19/2024    Procedure: CYSTOSCOPY URETEROSCOPY RETROGRADE PYELOGRAM STENT INSERTION;  Surgeon: Modesta Butts MD;  Location: [facility] MAIN OR;  Service: Urology;  Laterality: Left;    ENDOSCOPY  04/12/2017    dr monique    ENDOSCOPY N/A 6/9/2022    Procedure: ESOPHAGOGASTRODUODENOSCOPY with biopsy;  Surgeon: Saen Monique MD;  Location: [facility] ENDOSCOPY;  Service: Gastroenterology;  Laterality: N/A;  gastritis    ENDOSCOPY N/A 9/27/2024    Procedure: ESOPHAGOGASTRODUODENOSCOPY with biopsy;  Surgeon: Sean Monique MD;  Location: [facility] ENDOSCOPY;  Service: Gastroenterology;  Laterality: N/A;  retained food, hiatal hernia, gastric polyp    EYE SURGERY      implant    NEPHRECTOMY PARTIAL Right 12/02/2014    robotic right partial nephrectomy w dr butts    OTHER SURGICAL HISTORY      joint surgery    SALIVARY GLAND SURGERY      TONSILLECTOMY      TOTAL KNEE ARTHROPLASTY Right     UPPER GASTROINTESTINAL ENDOSCOPY  12/22/2020    Dr. Monique    WRIST SURGERY Left      Past Medical History:  Past Medical History:   Diagnosis Date    Anemia     Anxiety     Arthritis     Bronchitis     Constipation     Depression     Diabetes     Diverticulitis     Dyspnea 04/03/2024    Epilepsy     Fracture of distal fibula 09/26/2017    right    GERD (gastroesophageal reflux disease)      Heart disease     HTN (hypertension)     Hyperlipidemia     Limb swelling     Lower abdominal pain     Nausea and vomiting in adult 2022    TAYLOR (obstructive sleep apnea)     Pneumonia     Polyp, sinus maxillary     Renal cell adenocarcinoma, right 12/10/2014    Seasonal allergies     Shortness of breath      Social History     Socioeconomic History    Marital status: Single   Tobacco Use    Smoking status: Former     Current packs/day: 0.00     Average packs/day: 1 pack/day for 5.0 years (5.0 ttl pk-yrs)     Types: Cigarettes     Start date:      Quit date: 2000     Years since quittin.9    Smokeless tobacco: Never    Tobacco comments:     Less than 5 years   Vaping Use    Vaping status: Never Used   Substance and Sexual Activity    Alcohol use: Never     Comment: drinks less than 1 drink per day     Drug use: Never    Sexual activity: Not Currently     Partners: Male     Birth control/protection: None     Family History   Problem Relation Age of Onset    Arthritis Mother     Arthritis Father     Cancer Father     Arthritis Sister     Arthritis Brother     Colon cancer Neg Hx     Malig Hyperthermia Neg Hx      Social History:  Martial Status:  x1, currently single  Employed: Caregiver, PRN, SSI  Kids: None  House: Lives alone  Legal: Denies   History: Denies  Developmental History:   Born: [place]  Siblings: 5th youngest of 6 siblings  High School: Graduate  College: Some    PLAN:   Presentation seems most consistent with DSM-V criteria for:  Diagnoses and all orders for this visit:    1. Moderate episode of recurrent major depressive disorder (Primary)    2. Generalized anxiety disorder       Continue lexapro 20 mg daily  Continue hydroxyzine 25 mg, three times daily as needed for anxiety/panic attack  Continue buspar 5 mg three times daily as needed for anxiety  Follow-up as per necessary  Medication Education:   LEXAPRO (ESCITALOPRAM)  Risks, benefits, alternatives discussed with patient  including GI upset, nausea vomiting diarrhea, theoretical decrease of seizure threshold predisposing the patient to a slightly higher seizure risk, headaches, sexual dysfunction, serotonin syndrome, bleeding risk.  After discussion of these risks and benefits, the patient voiced understanding and agreed to proceed.  VISTARIL (HYDROXYZINE) Risks, benefits, alternatives discussed with patient including sedation, dizziness, fall risk, GI upset, and risk of increased CNS depression and elevated heart rate if taken with other antihistamines.  After discussion of these risks and benefits, the patient voiced understanding and agreed to proceed.  BUSPAR (BUSPIRONE) Risks, benefits, alternatives discussed with patient including nausea, GI upset, mild sedation, falls risk.  After discussion of these risks and benefits, the patient voiced understanding and agreed to proceed.    Medications: No orders of the defined types were placed in this encounter.     SHAMIR reviewed.   Discussed medication options and treatment plan of prescribed medication as well as the risks, benefits, and side effects.  Patient is agreeable to call the office with any worsening of symptoms or onset of side effects.   Patient is agreeable to call 911 or go to the nearest ER should he/she begin having SI/HI.   Patient acknowledged, is agreeable to continue with current treatment plan, and was educated on the importance of compliance with treatment and follow-up appointments.  Addressed all questions and concerns.    Psychotherapy:    Will continue therapy at future encounters.   Functional status: Moderate symptoms OR moderate difficulty in social occupational or social functioning (51-60)  Prognosis: Good chance of responding well to treatment   Progress: Will address progress at follow-up visits.    Follow-up: Return if symptoms worsen or fail to improve.       This document has been electronically signed by LORENZO Padgett  December 16, 2024  12:45 EST    Please note that portions of this note were completed with a voice recognition program.  Copied text in this note has been reviewed and is accurate as of 12/16/24

## 2024-12-17 ENCOUNTER — HOSPITAL ENCOUNTER (OUTPATIENT)
Dept: ULTRASOUND IMAGING | Facility: HOSPITAL | Age: 74
Discharge: HOME | End: 2024-12-17
Admitting: UROLOGY
Payer: MEDICARE

## 2024-12-17 DIAGNOSIS — C64.1 RENAL CELL ADENOCARCINOMA, RIGHT: ICD-10-CM

## 2024-12-17 DIAGNOSIS — N13.39 OTHER HYDRONEPHROSIS: ICD-10-CM

## 2024-12-17 PROCEDURE — 76775 US EXAM ABDO BACK WALL LIM: CPT

## 2024-12-19 ENCOUNTER — TELEPHONE (OUTPATIENT)
Dept: UROLOGY | Age: 74
End: 2024-12-19
Payer: MEDICARE

## 2024-12-19 DIAGNOSIS — N13.39 OTHER HYDRONEPHROSIS: ICD-10-CM

## 2024-12-19 DIAGNOSIS — C64.1 RENAL CELL ADENOCARCINOMA, RIGHT: Primary | ICD-10-CM

## 2024-12-19 NOTE — TELEPHONE ENCOUNTER
Left message informing patient that Dr. Butts said that renal ultrasound is normal.  and that Rehan Vargas wants to order a CT scan for a year form now with a follow up appointment. I informed patient that I would put in a recall that will notify patient next October/November to call for an appointment but advised that patient call now and schedule as by the time the recall is received we will likely be scheduling in to February/March of the following year.

## 2025-01-02 ENCOUNTER — OFFICE VISIT (OUTPATIENT)
Dept: INTERNAL MEDICINE | Facility: CLINIC | Age: 75
End: 2025-01-02
Payer: MEDICARE

## 2025-01-02 VITALS
TEMPERATURE: 97.1 F | HEIGHT: 67 IN | SYSTOLIC BLOOD PRESSURE: 122 MMHG | WEIGHT: 244 LBS | DIASTOLIC BLOOD PRESSURE: 60 MMHG | RESPIRATION RATE: 16 BRPM | BODY MASS INDEX: 38.3 KG/M2 | HEART RATE: 71 BPM | OXYGEN SATURATION: 96 %

## 2025-01-02 DIAGNOSIS — F41.1 GENERALIZED ANXIETY DISORDER: ICD-10-CM

## 2025-01-02 DIAGNOSIS — I10 PRIMARY HYPERTENSION: ICD-10-CM

## 2025-01-02 DIAGNOSIS — E11.65 TYPE 2 DIABETES MELLITUS WITH HYPERGLYCEMIA, WITHOUT LONG-TERM CURRENT USE OF INSULIN: Primary | ICD-10-CM

## 2025-01-02 DIAGNOSIS — E78.5 HYPERLIPIDEMIA, UNSPECIFIED HYPERLIPIDEMIA TYPE: ICD-10-CM

## 2025-01-02 DIAGNOSIS — F32.A DEPRESSION, UNSPECIFIED DEPRESSION TYPE: ICD-10-CM

## 2025-01-02 DIAGNOSIS — G40.909 NONINTRACTABLE EPILEPSY WITHOUT STATUS EPILEPTICUS, UNSPECIFIED EPILEPSY TYPE: ICD-10-CM

## 2025-01-02 LAB
ALBUMIN SERPL-MCNC: 4.1 G/DL (ref 3.5–5.2)
ALBUMIN UR-MCNC: <1.2 MG/DL
ALBUMIN/GLOB SERPL: 1.4 G/DL
ALP SERPL-CCNC: 96 U/L (ref 39–117)
ALT SERPL W P-5'-P-CCNC: 22 U/L (ref 1–33)
ANION GAP SERPL CALCULATED.3IONS-SCNC: 8.2 MMOL/L (ref 5–15)
AST SERPL-CCNC: 22 U/L (ref 1–32)
BASOPHILS # BLD AUTO: 0.03 10*3/MM3 (ref 0–0.2)
BASOPHILS NFR BLD AUTO: 0.7 % (ref 0–1.5)
BILIRUB SERPL-MCNC: 0.4 MG/DL (ref 0–1.2)
BUN SERPL-MCNC: 22 MG/DL (ref 8–23)
BUN/CREAT SERPL: 24.2 (ref 7–25)
CALCIUM SPEC-SCNC: 9.7 MG/DL (ref 8.6–10.5)
CHLORIDE SERPL-SCNC: 105 MMOL/L (ref 98–107)
CHOLEST SERPL-MCNC: 166 MG/DL (ref 0–200)
CO2 SERPL-SCNC: 26.8 MMOL/L (ref 22–29)
CREAT SERPL-MCNC: 0.91 MG/DL (ref 0.57–1)
DEPRECATED RDW RBC AUTO: 43.7 FL (ref 37–54)
EGFRCR SERPLBLD CKD-EPI 2021: 67.2 ML/MIN/1.73
EOSINOPHIL # BLD AUTO: 0.08 10*3/MM3 (ref 0–0.4)
EOSINOPHIL NFR BLD AUTO: 1.9 % (ref 0.3–6.2)
ERYTHROCYTE [DISTWIDTH] IN BLOOD BY AUTOMATED COUNT: 12.8 % (ref 12.3–15.4)
GLOBULIN UR ELPH-MCNC: 3 GM/DL
GLUCOSE SERPL-MCNC: 147 MG/DL (ref 65–99)
HBA1C MFR BLD: 6.2 % (ref 4.8–5.6)
HCT VFR BLD AUTO: 38.4 % (ref 34–46.6)
HDLC SERPL-MCNC: 57 MG/DL (ref 40–60)
HGB BLD-MCNC: 12.5 G/DL (ref 12–15.9)
IMM GRANULOCYTES # BLD AUTO: 0.02 10*3/MM3 (ref 0–0.05)
IMM GRANULOCYTES NFR BLD AUTO: 0.5 % (ref 0–0.5)
LDLC SERPL CALC-MCNC: 86 MG/DL (ref 0–100)
LDLC/HDLC SERPL: 1.44 {RATIO}
LYMPHOCYTES # BLD AUTO: 1.41 10*3/MM3 (ref 0.7–3.1)
LYMPHOCYTES NFR BLD AUTO: 33.2 % (ref 19.6–45.3)
MCH RBC QN AUTO: 30.1 PG (ref 26.6–33)
MCHC RBC AUTO-ENTMCNC: 32.6 G/DL (ref 31.5–35.7)
MCV RBC AUTO: 92.5 FL (ref 79–97)
MONOCYTES # BLD AUTO: 0.32 10*3/MM3 (ref 0.1–0.9)
MONOCYTES NFR BLD AUTO: 7.5 % (ref 5–12)
NEUTROPHILS NFR BLD AUTO: 2.39 10*3/MM3 (ref 1.7–7)
NEUTROPHILS NFR BLD AUTO: 56.2 % (ref 42.7–76)
NRBC BLD AUTO-RTO: 0 /100 WBC (ref 0–0.2)
PLATELET # BLD AUTO: 242 10*3/MM3 (ref 140–450)
PMV BLD AUTO: 9.4 FL (ref 6–12)
POTASSIUM SERPL-SCNC: 4.2 MMOL/L (ref 3.5–5.2)
PROT SERPL-MCNC: 7.1 G/DL (ref 6–8.5)
RBC # BLD AUTO: 4.15 10*6/MM3 (ref 3.77–5.28)
SODIUM SERPL-SCNC: 140 MMOL/L (ref 136–145)
TRIGL SERPL-MCNC: 134 MG/DL (ref 0–150)
TSH SERPL DL<=0.05 MIU/L-ACNC: 2.42 UIU/ML (ref 0.27–4.2)
VLDLC SERPL-MCNC: 23 MG/DL (ref 5–40)
WBC NRBC COR # BLD AUTO: 4.25 10*3/MM3 (ref 3.4–10.8)

## 2025-01-02 PROCEDURE — 99214 OFFICE O/P EST MOD 30 MIN: CPT | Performed by: PHYSICIAN ASSISTANT

## 2025-01-02 PROCEDURE — 83036 HEMOGLOBIN GLYCOSYLATED A1C: CPT | Performed by: PHYSICIAN ASSISTANT

## 2025-01-02 PROCEDURE — 36415 COLL VENOUS BLD VENIPUNCTURE: CPT | Performed by: PHYSICIAN ASSISTANT

## 2025-01-02 PROCEDURE — 84443 ASSAY THYROID STIM HORMONE: CPT | Performed by: PHYSICIAN ASSISTANT

## 2025-01-02 PROCEDURE — 85025 COMPLETE CBC W/AUTO DIFF WBC: CPT | Performed by: PHYSICIAN ASSISTANT

## 2025-01-02 PROCEDURE — 1126F AMNT PAIN NOTED NONE PRSNT: CPT | Performed by: PHYSICIAN ASSISTANT

## 2025-01-02 PROCEDURE — 3078F DIAST BP <80 MM HG: CPT | Performed by: PHYSICIAN ASSISTANT

## 2025-01-02 PROCEDURE — 80061 LIPID PANEL: CPT | Performed by: PHYSICIAN ASSISTANT

## 2025-01-02 PROCEDURE — 1160F RVW MEDS BY RX/DR IN RCRD: CPT | Performed by: PHYSICIAN ASSISTANT

## 2025-01-02 PROCEDURE — 80053 COMPREHEN METABOLIC PANEL: CPT | Performed by: PHYSICIAN ASSISTANT

## 2025-01-02 PROCEDURE — 82043 UR ALBUMIN QUANTITATIVE: CPT | Performed by: PHYSICIAN ASSISTANT

## 2025-01-02 PROCEDURE — 1159F MED LIST DOCD IN RCRD: CPT | Performed by: PHYSICIAN ASSISTANT

## 2025-01-02 PROCEDURE — 80177 DRUG SCRN QUAN LEVETIRACETAM: CPT | Performed by: PHYSICIAN ASSISTANT

## 2025-01-02 PROCEDURE — 3074F SYST BP LT 130 MM HG: CPT | Performed by: PHYSICIAN ASSISTANT

## 2025-01-02 RX ORDER — PANTOPRAZOLE SODIUM 40 MG/1
40 TABLET, DELAYED RELEASE ORAL DAILY
Qty: 90 TABLET | Refills: 1 | Status: CANCELLED | OUTPATIENT
Start: 2025-01-02

## 2025-01-02 RX ORDER — LEVOCETIRIZINE DIHYDROCHLORIDE 5 MG/1
5 TABLET, FILM COATED ORAL EVERY EVENING
Qty: 90 TABLET | Refills: 1 | Status: SHIPPED | OUTPATIENT
Start: 2025-01-02

## 2025-01-02 RX ORDER — TRAZODONE HYDROCHLORIDE 50 MG/1
50 TABLET, FILM COATED ORAL
Qty: 90 TABLET | Refills: 1 | Status: CANCELLED | OUTPATIENT
Start: 2025-01-02

## 2025-01-02 RX ORDER — HYDROXYZINE PAMOATE 25 MG/1
25 CAPSULE ORAL 3 TIMES DAILY PRN
Qty: 90 CAPSULE | Refills: 0 | Status: SHIPPED | OUTPATIENT
Start: 2025-01-02

## 2025-01-02 RX ORDER — FLUTICASONE PROPIONATE 50 MCG
2 SPRAY, SUSPENSION (ML) NASAL DAILY
Qty: 11.1 G | Refills: 5 | Status: SHIPPED | OUTPATIENT
Start: 2025-01-02

## 2025-01-02 RX ORDER — LISINOPRIL 2.5 MG/1
2.5 TABLET ORAL DAILY
Qty: 90 TABLET | Refills: 1 | Status: CANCELLED | OUTPATIENT
Start: 2025-01-02

## 2025-01-02 RX ORDER — BUSPIRONE HYDROCHLORIDE 5 MG/1
5 TABLET ORAL 3 TIMES DAILY
Qty: 90 TABLET | Refills: 1 | Status: CANCELLED | OUTPATIENT
Start: 2025-01-02

## 2025-01-02 RX ORDER — ATORVASTATIN CALCIUM 20 MG/1
20 TABLET, FILM COATED ORAL DAILY
Qty: 90 TABLET | Refills: 1 | Status: CANCELLED | OUTPATIENT
Start: 2025-01-02

## 2025-01-02 RX ORDER — DOCUSATE SODIUM 100 MG/1
100 CAPSULE, LIQUID FILLED ORAL 2 TIMES DAILY
Status: CANCELLED | OUTPATIENT
Start: 2025-01-02

## 2025-01-02 RX ORDER — LEVETIRACETAM 750 MG/1
750 TABLET ORAL DAILY
Qty: 90 TABLET | Refills: 1 | Status: SHIPPED | OUTPATIENT
Start: 2025-01-02

## 2025-01-02 RX ORDER — PEN NEEDLE, DIABETIC 30 GX3/16"
1 NEEDLE, DISPOSABLE MISCELLANEOUS WEEKLY
Qty: 90 EACH | Refills: 1 | Status: SHIPPED | OUTPATIENT
Start: 2025-01-02

## 2025-01-02 RX ORDER — LEVOCETIRIZINE DIHYDROCHLORIDE 5 MG/1
5 TABLET, FILM COATED ORAL EVERY EVENING
Qty: 90 TABLET | Refills: 1 | Status: CANCELLED | OUTPATIENT
Start: 2025-01-02

## 2025-01-02 RX ORDER — TENAPANOR HYDROCHLORIDE 53.2 MG/1
50 TABLET ORAL 2 TIMES DAILY
Qty: 60 TABLET | Refills: 3 | Status: CANCELLED | OUTPATIENT
Start: 2025-01-02

## 2025-01-02 RX ORDER — DICYCLOMINE HCL 20 MG
TABLET ORAL
Qty: 90 TABLET | Refills: 0 | Status: CANCELLED | OUTPATIENT
Start: 2025-01-02

## 2025-01-02 RX ORDER — DAPAGLIFLOZIN 10 MG/1
1 TABLET, FILM COATED ORAL DAILY
Qty: 90 TABLET | Refills: 1 | Status: SHIPPED | OUTPATIENT
Start: 2025-01-02

## 2025-01-02 RX ORDER — LEVETIRACETAM 750 MG/1
750 TABLET ORAL 2 TIMES DAILY
Qty: 180 TABLET | Refills: 1 | Status: CANCELLED | OUTPATIENT
Start: 2025-01-02

## 2025-01-02 RX ORDER — FLUTICASONE PROPIONATE 50 MCG
SPRAY, SUSPENSION (ML) NASAL
Status: CANCELLED | OUTPATIENT
Start: 2025-01-02

## 2025-01-02 RX ORDER — ESCITALOPRAM OXALATE 20 MG/1
20 TABLET ORAL DAILY
Qty: 90 TABLET | Refills: 1 | Status: CANCELLED | OUTPATIENT
Start: 2025-01-02

## 2025-01-02 RX ORDER — SEMAGLUTIDE 1.34 MG/ML
INJECTION, SOLUTION SUBCUTANEOUS
Status: CANCELLED | OUTPATIENT
Start: 2025-01-02

## 2025-01-02 RX ORDER — ESCITALOPRAM OXALATE 20 MG/1
20 TABLET ORAL DAILY
Qty: 90 TABLET | Refills: 1 | Status: SHIPPED | OUTPATIENT
Start: 2025-01-02

## 2025-01-02 RX ORDER — LISINOPRIL 2.5 MG/1
2.5 TABLET ORAL DAILY
Qty: 90 TABLET | Refills: 1 | Status: SHIPPED | OUTPATIENT
Start: 2025-01-02

## 2025-01-02 RX ORDER — BUSPIRONE HYDROCHLORIDE 5 MG/1
5 TABLET ORAL 3 TIMES DAILY
Qty: 90 TABLET | Refills: 1 | Status: SHIPPED | OUTPATIENT
Start: 2025-01-02

## 2025-01-02 RX ORDER — PANTOPRAZOLE SODIUM 40 MG/1
40 TABLET, DELAYED RELEASE ORAL DAILY
Qty: 90 TABLET | Refills: 1 | Status: SHIPPED | OUTPATIENT
Start: 2025-01-02

## 2025-01-02 RX ORDER — DAPAGLIFLOZIN 10 MG/1
1 TABLET, FILM COATED ORAL DAILY
Qty: 90 TABLET | Refills: 0 | Status: CANCELLED | OUTPATIENT
Start: 2025-01-02

## 2025-01-02 RX ORDER — SEMAGLUTIDE 0.68 MG/ML
0.25 INJECTION, SOLUTION SUBCUTANEOUS WEEKLY
Qty: 3 ML | Refills: 1 | Status: SHIPPED | OUTPATIENT
Start: 2025-01-02

## 2025-01-02 RX ORDER — ATORVASTATIN CALCIUM 20 MG/1
20 TABLET, FILM COATED ORAL DAILY
Qty: 90 TABLET | Refills: 1 | Status: SHIPPED | OUTPATIENT
Start: 2025-01-02

## 2025-01-02 RX ORDER — TRAZODONE HYDROCHLORIDE 50 MG/1
50 TABLET, FILM COATED ORAL
Qty: 90 TABLET | Refills: 1 | Status: SHIPPED | OUTPATIENT
Start: 2025-01-02

## 2025-01-02 NOTE — PROGRESS NOTES
Chief Complaint  Anxiety, Diabetes, and Anemia    Subjective          Sowmya Hodges presents to [facility]  History of Present Illness  DM: bg running 125  Pt states she is trying to lose weight and skipped meals at times causing low bg   Denies chest pain, palpitations, ha, dizziness  Ran out of Omzepic but would like to restart for her diabetes  Anxiety: has been doing well   Using Lexapro daily  States she has used buspar as needed  Was seen by Renetta Nguyen  Epilepsy: has not had seizure in 16 years  Has not seen neurology lately  Has been taking Keppra BID x yrs      Past Medical History:   Diagnosis Date    Anemia     Anxiety     Arthritis     Bronchitis     Constipation     Depression     Diabetes     Diverticulitis     Dyspnea 04/03/2024    Epilepsy     Fracture of distal fibula 09/26/2017    right    GERD (gastroesophageal reflux disease)     Heart disease     HTN (hypertension)     Hyperlipidemia     Limb swelling     Lower abdominal pain     Nausea and vomiting in adult 06/03/2022    TAYLOR (obstructive sleep apnea)     Pneumonia     Polyp, sinus maxillary     Renal cell adenocarcinoma, right 12/10/2014    Seasonal allergies     Shortness of breath         Past Surgical History:   Procedure Laterality Date    CARDIAC SURGERY      open heart at age 4     CARDIAC VALVE SURGERY      CHOLECYSTECTOMY      COLONOSCOPY  04/12/2017    dr monique    COLONOSCOPY N/A 1/17/2023    Procedure: COLONOSCOPY WITH BIOPSIES, POLYPECTOMY;  Surgeon: Sean Monique MD;  Location: [facility] ENDOSCOPY;  Service: Gastroenterology;  Laterality: N/A;  COLON POLYP    CYSTOSCOPY, URETEROSCOPY, RETROGRADE PYELOGRAM, STENT INSERTION Left 9/19/2024    Procedure: CYSTOSCOPY URETEROSCOPY RETROGRADE PYELOGRAM STENT INSERTION;  Surgeon: Modesta Butts MD;  Location: [facility] MAIN OR;  Service: Urology;  Laterality: Left;    ENDOSCOPY  04/12/2017    dr monique    ENDOSCOPY N/A 6/9/2022     Procedure: ESOPHAGOGASTRODUODENOSCOPY with biopsy;  Surgeon: Sean Oneil MD;  Location: [facility] ENDOSCOPY;  Service: Gastroenterology;  Laterality: N/A;  gastritis    ENDOSCOPY N/A 9/27/2024    Procedure: ESOPHAGOGASTRODUODENOSCOPY with biopsy;  Surgeon: Sean Oneil MD;  Location: [facility] ENDOSCOPY;  Service: Gastroenterology;  Laterality: N/A;  retained food, hiatal hernia, gastric polyp    EYE SURGERY      implant    NEPHRECTOMY PARTIAL Right 12/02/2014    robotic right partial nephrectomy w dr garvey    OTHER SURGICAL HISTORY      joint surgery    SALIVARY GLAND SURGERY      TONSILLECTOMY      TOTAL KNEE ARTHROPLASTY Right     UPPER GASTROINTESTINAL ENDOSCOPY  12/22/2020    Dr. Oneil    WRIST SURGERY Left         Current Outpatient Medications on File Prior to Visit   Medication Sig Dispense Refill    diclofenac (VOLTAREN) 75 MG EC tablet TAKE 1 TABLET BY MOUTH TWICE A DAY 60 tablet 1    dicyclomine (BENTYL) 20 MG tablet Take 1 po before meals and at bedtimes as needed for cramping 90 tablet 0    docusate sodium (COLACE) 100 MG capsule Take 1 capsule by mouth 2 (Two) Times a Day.      lidocaine (LIDODERM) 5 % Place 1 patch on the skin as directed by provider Daily.      linaclotide (Linzess) 145 MCG capsule capsule Take 1 capsule by mouth Daily.      ondansetron ODT (ZOFRAN-ODT) 4 MG disintegrating tablet Take 1 tablet by mouth.      oxyCODONE-acetaminophen (PERCOCET) 7.5-325 MG per tablet Take 1 tablet every 8 hours by oral route as needed for 30 days.      Tenapanor HCl (Ibsrela) 50 MG tablet Take 1 tablet by mouth 2 (Two) Times a Day. 60 tablet 3    vitamin D3 125 MCG (5000 UT) capsule capsule Take 1 capsule by mouth Daily.      [DISCONTINUED] atorvastatin (LIPITOR) 20 MG tablet TAKE 1 TABLET BY MOUTH EVERY DAY 90 tablet 1    [DISCONTINUED] Blood Glucose Monitoring Suppl (Accu-Chek Sabrina Plus) w/Device kit 1 kit Take As Directed. To checke blood sugar up to 3 times a day. DX E11.9 1 kit 0     [DISCONTINUED] busPIRone (BUSPAR) 5 MG tablet Take 1 tablet by mouth 3 (Three) Times a Day. 90 tablet 1    [DISCONTINUED] escitalopram (LEXAPRO) 20 MG tablet Take 1 tablet by mouth Daily. 90 tablet 1    [DISCONTINUED] Farxiga 10 MG tablet TAKE 1 TABLET BY MOUTH EVERY DAY 90 tablet 0    [DISCONTINUED] fluticasone (FLONASE) 50 MCG/ACT nasal spray USE 1-2 SPRAYS IN EACH NOSTRIL ONCE DAILY AS NEEDED 48 mL 1    [DISCONTINUED] hydrOXYzine pamoate (VISTARIL) 25 MG capsule Take 1 capsule by mouth 3 (Three) Times a Day As Needed for Anxiety. 90 capsule 0    [DISCONTINUED] Insulin Pen Needle (Pen Needles) 32G X 4 MM misc 1 each 1 (One) Time Per Week. 90 each 1    [DISCONTINUED] levETIRAcetam (KEPPRA) 750 MG tablet TAKE 1 TABLET BY MOUTH TWICE A  tablet 1    [DISCONTINUED] levocetirizine (XYZAL) 5 MG tablet TAKE 1 TABLET BY MOUTH EVERY DAY IN THE EVENING 90 tablet 1    [DISCONTINUED] lisinopril (PRINIVIL,ZESTRIL) 2.5 MG tablet Take 1 tablet by mouth Daily. 90 tablet 1    [DISCONTINUED] pantoprazole (PROTONIX) 40 MG EC tablet Take 1 tablet by mouth Daily. 90 tablet 1    [DISCONTINUED] SITagliptin (Januvia) 100 MG tablet Take 1 tablet by mouth Daily. 30 tablet 5    [DISCONTINUED] traZODone (DESYREL) 50 MG tablet TAKE 1 TABLET BY MOUTH EVERYDAY AT BEDTIME 90 tablet 1    [DISCONTINUED] baclofen (LIORESAL) 10 MG tablet Take 1 tablet by mouth 2 (Two) Times a Day. (Patient not taking: Reported on 1/2/2025)      [DISCONTINUED] benzonatate (TESSALON) 100 MG capsule Take 1 capsule by mouth. (Patient not taking: Reported on 1/2/2025)      [DISCONTINUED] promethazine-dextromethorphan (PROMETHAZINE-DM) 6.25-15 MG/5ML syrup Take 2.5 mL by mouth.      [DISCONTINUED] Semaglutide, 1 MG/DOSE, (Ozempic, 1 MG/DOSE,) 4 MG/3ML solution pen-injector INJECT 1MG UNDER THE SKIN INTO THE APPROPRIATE AREA AS DIRECTED EVERY 7 DAYS (Patient not taking: Reported on 1/2/2025)       No current facility-administered medications on file prior to visit.  "       Allergies   Allergen Reactions    Codeine Rash              Social History     Tobacco Use   Smoking Status Former    Current packs/day: 0.00    Average packs/day: 1 pack/day for 5.0 years (5.0 ttl pk-yrs)    Types: Cigarettes    Start date:     Quit date:     Years since quittin.0   Smokeless Tobacco Never   Tobacco Comments    Less than 5 years          Objective   Vital Signs:   /60 (BP Location: Left arm, Patient Position: Sitting, Cuff Size: Large Adult)   Pulse 71   Temp 97.1 °F (36.2 °C) (Temporal)   Resp 16   Ht 170.2 cm (67.01\")   Wt 111 kg (244 lb)   SpO2 96%   BMI 38.20 kg/m²     Physical Exam  Vitals reviewed.   Constitutional:       Appearance: Normal appearance.   HENT:      Head: Normocephalic and atraumatic.      Nose: Nose normal.      Mouth/Throat:      Mouth: Mucous membranes are moist.   Eyes:      Extraocular Movements: Extraocular movements intact.      Conjunctiva/sclera: Conjunctivae normal.      Pupils: Pupils are equal, round, and reactive to light.   Cardiovascular:      Rate and Rhythm: Normal rate and regular rhythm.   Pulmonary:      Effort: Pulmonary effort is normal.      Breath sounds: Normal breath sounds.   Abdominal:      General: Abdomen is flat. Bowel sounds are normal.      Palpations: Abdomen is soft.   Musculoskeletal:         General: Normal range of motion.   Neurological:      General: No focal deficit present.      Mental Status: She is alert and oriented to person, place, and time.   Psychiatric:         Mood and Affect: Mood normal.        Result Review :                           Assessment and Plan    Diagnoses and all orders for this visit:    1. Type 2 diabetes mellitus with hyperglycemia, without long-term current use of insulin (Primary)  Assessment & Plan:  Labs today, will adjust medication based on results.      Orders:  -     Hemoglobin A1c    2. Primary hypertension  Assessment & Plan:  Hypertension is stable and " controlled  Continue current treatment regimen.  Blood pressure will be reassessed in 3 months.    Orders:  -     Comprehensive Metabolic Panel  -     CBC & Differential  -     TSH    3. Hyperlipidemia, unspecified hyperlipidemia type  Assessment & Plan:  Labs today, will adjust medication based on results.      Orders:  -     Lipid Panel    4. Generalized anxiety disorder  Comments:  doing well on current medicines, f/u with psych as needed    5. Depression, unspecified depression type    6. Nonintractable epilepsy without status epilepticus, unspecified epilepsy type  Assessment & Plan:  No seizure if >16 yr. Pt interested in decreasing Keppra, will decrease from BID to once daily. Will refer to neuro to discuss d/c. Pt will let us know if seizure activity occurs.     Orders:  -     Ambulatory Referral to Neurology  -     Levetiracetam Level (Keppra)    Other orders  -     Semaglutide,0.25 or 0.5MG/DOS, (Ozempic, 0.25 or 0.5 MG/DOSE,) 2 MG/3ML solution pen-injector; Inject 0.25 mg under the skin into the appropriate area as directed 1 (One) Time Per Week.  Dispense: 3 mL; Refill: 1  -     traZODone (DESYREL) 50 MG tablet; Take 1 tablet by mouth every night at bedtime.  Dispense: 90 tablet; Refill: 1  -     pantoprazole (PROTONIX) 40 MG EC tablet; Take 1 tablet by mouth Daily.  Dispense: 90 tablet; Refill: 1  -     lisinopril (PRINIVIL,ZESTRIL) 2.5 MG tablet; Take 1 tablet by mouth Daily.  Dispense: 90 tablet; Refill: 1  -     dapagliflozin Propanediol (Farxiga) 10 MG tablet; Take 10 mg by mouth Daily.  Dispense: 90 tablet; Refill: 1  -     fluticasone (FLONASE) 50 MCG/ACT nasal spray; Administer 2 sprays into the nostril(s) as directed by provider Daily.  Dispense: 11.1 g; Refill: 5  -     Insulin Pen Needle (Pen Needles) 32G X 4 MM misc; Use 1 each 1 (One) Time Per Week.  Dispense: 90 each; Refill: 1  -     escitalopram (LEXAPRO) 20 MG tablet; Take 1 tablet by mouth Daily.  Dispense: 90 tablet; Refill: 1  -      hydrOXYzine pamoate (VISTARIL) 25 MG capsule; Take 1 capsule by mouth 3 (Three) Times a Day As Needed for Anxiety.  Dispense: 90 capsule; Refill: 0  -     levocetirizine (XYZAL) 5 MG tablet; Take 1 tablet by mouth Every Evening.  Dispense: 90 tablet; Refill: 1  -     atorvastatin (LIPITOR) 20 MG tablet; Take 1 tablet by mouth Daily.  Dispense: 90 tablet; Refill: 1  -     busPIRone (BUSPAR) 5 MG tablet; Take 1 tablet by mouth 3 (Three) Times a Day.  Dispense: 90 tablet; Refill: 1  -     levETIRAcetam (KEPPRA) 750 MG tablet; Take 1 tablet by mouth Daily.  Dispense: 90 tablet; Refill: 1        Follow Up   Return in about 3 months (around 4/2/2025) for Medicare Wellness.  Patient was given instructions and counseling regarding her condition or for health maintenance advice. Please see specific information pulled into the AVS if appropriate.

## 2025-01-02 NOTE — ASSESSMENT & PLAN NOTE
No seizure if >16 yr. Pt interested in decreasing Keppra, will decrease from BID to once daily. Will refer to neuro to discuss d/c. Pt will let us know if seizure activity occurs.

## 2025-01-05 LAB — LEVETIRACETAM SERPL-MCNC: 11.9 UG/ML (ref 10–40)

## 2025-01-14 DIAGNOSIS — M65.332 TRIGGER FINGER, LEFT MIDDLE FINGER: ICD-10-CM

## 2025-01-15 RX ORDER — DICLOFENAC SODIUM 75 MG/1
75 TABLET, DELAYED RELEASE ORAL 2 TIMES DAILY
Qty: 60 TABLET | Refills: 1 | Status: SHIPPED | OUTPATIENT
Start: 2025-01-15

## 2025-01-15 NOTE — TELEPHONE ENCOUNTER
That previous message is supposed to read previously prescribed 5 mg.  Please ask what dose patient is currently taking?

## 2025-01-16 ENCOUNTER — OFFICE VISIT (OUTPATIENT)
Dept: INTERNAL MEDICINE | Facility: CLINIC | Age: 75
End: 2025-01-16
Payer: MEDICARE

## 2025-01-16 ENCOUNTER — TELEPHONE (OUTPATIENT)
Dept: INTERNAL MEDICINE | Facility: CLINIC | Age: 75
End: 2025-01-16

## 2025-01-16 VITALS
SYSTOLIC BLOOD PRESSURE: 128 MMHG | TEMPERATURE: 96.5 F | WEIGHT: 238.25 LBS | BODY MASS INDEX: 37.39 KG/M2 | HEIGHT: 67 IN | HEART RATE: 79 BPM | DIASTOLIC BLOOD PRESSURE: 80 MMHG | OXYGEN SATURATION: 96 %

## 2025-01-16 DIAGNOSIS — R05.1 ACUTE COUGH: Primary | ICD-10-CM

## 2025-01-16 DIAGNOSIS — J06.9 VIRAL URI: ICD-10-CM

## 2025-01-16 DIAGNOSIS — H69.92 DYSFUNCTION OF LEFT EUSTACHIAN TUBE: ICD-10-CM

## 2025-01-16 LAB
EXPIRATION DATE: NORMAL
FLUAV AG UPPER RESP QL IA.RAPID: NOT DETECTED
FLUBV AG UPPER RESP QL IA.RAPID: NOT DETECTED
INTERNAL CONTROL: NORMAL
Lab: NORMAL
SARS-COV-2 AG UPPER RESP QL IA.RAPID: NOT DETECTED

## 2025-01-16 PROCEDURE — 99213 OFFICE O/P EST LOW 20 MIN: CPT | Performed by: PHYSICIAN ASSISTANT

## 2025-01-16 PROCEDURE — 3044F HG A1C LEVEL LT 7.0%: CPT | Performed by: PHYSICIAN ASSISTANT

## 2025-01-16 PROCEDURE — 3074F SYST BP LT 130 MM HG: CPT | Performed by: PHYSICIAN ASSISTANT

## 2025-01-16 PROCEDURE — 1160F RVW MEDS BY RX/DR IN RCRD: CPT | Performed by: PHYSICIAN ASSISTANT

## 2025-01-16 PROCEDURE — 87428 SARSCOV & INF VIR A&B AG IA: CPT | Performed by: PHYSICIAN ASSISTANT

## 2025-01-16 PROCEDURE — 1159F MED LIST DOCD IN RCRD: CPT | Performed by: PHYSICIAN ASSISTANT

## 2025-01-16 PROCEDURE — 1126F AMNT PAIN NOTED NONE PRSNT: CPT | Performed by: PHYSICIAN ASSISTANT

## 2025-01-16 PROCEDURE — 3079F DIAST BP 80-89 MM HG: CPT | Performed by: PHYSICIAN ASSISTANT

## 2025-01-16 RX ORDER — MONTELUKAST SODIUM 10 MG/1
10 TABLET ORAL NIGHTLY
Qty: 30 TABLET | Refills: 0 | Status: SHIPPED | OUTPATIENT
Start: 2025-01-16

## 2025-01-16 RX ORDER — BENZONATATE 100 MG/1
100 CAPSULE ORAL 3 TIMES DAILY PRN
Qty: 30 CAPSULE | Refills: 0 | Status: SHIPPED | OUTPATIENT
Start: 2025-01-16 | End: 2025-01-26

## 2025-01-16 NOTE — PROGRESS NOTES
Chief Complaint  Ear pain, congestion    Subjective          Sowmya Hodges presents to [facility]  History of Present Illness  Patient complaining of pain of left ear, cough x 2 to 3 days.  Pressure under her eyes.  Denies runny nose or congestion  Cough is dry   Denies wheezing, resp distress, sob, chest pain, dizziness  Denies fever  Has body aches in chest and back   Denies pain in chest that moves to jaw/shoulder   Denies associated nausea, vomiting  She tried tylenol arthritis   No sick contacts  Using flonase daily      Past Medical History:   Diagnosis Date    Anemia     Anxiety     Arthritis     Bronchitis     Constipation     Depression     Diabetes     Diverticulitis     Dyspnea 04/03/2024    Epilepsy     Fracture of distal fibula 09/26/2017    right    GERD (gastroesophageal reflux disease)     Heart disease     HTN (hypertension)     Hyperlipidemia     Limb swelling     Lower abdominal pain     Nausea and vomiting in adult 06/03/2022    TAYLOR (obstructive sleep apnea)     Pneumonia     Polyp, sinus maxillary     Renal cell adenocarcinoma, right 12/10/2014    Seasonal allergies     Shortness of breath         Past Surgical History:   Procedure Laterality Date    CARDIAC SURGERY      open heart at age 4     CARDIAC VALVE SURGERY      CHOLECYSTECTOMY      COLONOSCOPY  04/12/2017    dr monique    COLONOSCOPY N/A 1/17/2023    Procedure: COLONOSCOPY WITH BIOPSIES, POLYPECTOMY;  Surgeon: Sean Monique MD;  Location: [facility] ENDOSCOPY;  Service: Gastroenterology;  Laterality: N/A;  COLON POLYP    CYSTOSCOPY, URETEROSCOPY, RETROGRADE PYELOGRAM, STENT INSERTION Left 9/19/2024    Procedure: CYSTOSCOPY URETEROSCOPY RETROGRADE PYELOGRAM STENT INSERTION;  Surgeon: Modesta Butts MD;  Location: [facility] MAIN OR;  Service: Urology;  Laterality: Left;    ENDOSCOPY  04/12/2017    dr monique    ENDOSCOPY N/A 6/9/2022    Procedure: ESOPHAGOGASTRODUODENOSCOPY with biopsy;   Surgeon: Sean Oneil MD;  Location: [facility] ENDOSCOPY;  Service: Gastroenterology;  Laterality: N/A;  gastritis    ENDOSCOPY N/A 9/27/2024    Procedure: ESOPHAGOGASTRODUODENOSCOPY with biopsy;  Surgeon: Sean Oneil MD;  Location: [facility] ENDOSCOPY;  Service: Gastroenterology;  Laterality: N/A;  retained food, hiatal hernia, gastric polyp    EYE SURGERY      implant    NEPHRECTOMY PARTIAL Right 12/02/2014    robotic right partial nephrectomy w dr garvey    OTHER SURGICAL HISTORY      joint surgery    SALIVARY GLAND SURGERY      TONSILLECTOMY      TOTAL KNEE ARTHROPLASTY Right     UPPER GASTROINTESTINAL ENDOSCOPY  12/22/2020    Dr. Oneil    WRIST SURGERY Left         Current Outpatient Medications on File Prior to Visit   Medication Sig Dispense Refill    atorvastatin (LIPITOR) 20 MG tablet Take 1 tablet by mouth Daily. 90 tablet 1    busPIRone (BUSPAR) 5 MG tablet Take 1 tablet by mouth 3 (Three) Times a Day. 90 tablet 1    dapagliflozin Propanediol (Farxiga) 10 MG tablet Take 10 mg by mouth Daily. 90 tablet 1    diclofenac (VOLTAREN) 75 MG EC tablet TAKE 1 TABLET BY MOUTH TWICE A DAY 60 tablet 1    dicyclomine (BENTYL) 20 MG tablet Take 1 po before meals and at bedtimes as needed for cramping 90 tablet 0    docusate sodium (COLACE) 100 MG capsule Take 1 capsule by mouth 2 (Two) Times a Day.      escitalopram (LEXAPRO) 20 MG tablet Take 1 tablet by mouth Daily. 90 tablet 1    fluticasone (FLONASE) 50 MCG/ACT nasal spray Administer 2 sprays into the nostril(s) as directed by provider Daily. 11.1 g 5    hydrOXYzine pamoate (VISTARIL) 25 MG capsule Take 1 capsule by mouth 3 (Three) Times a Day As Needed for Anxiety. 90 capsule 0    levETIRAcetam (KEPPRA) 750 MG tablet Take 1 tablet by mouth Daily. 90 tablet 1    levocetirizine (XYZAL) 5 MG tablet Take 1 tablet by mouth Every Evening. 90 tablet 1    lidocaine (LIDODERM) 5 % Place 1 patch on the skin as directed by provider Daily.      linaclotide  "(Linzess) 145 MCG capsule capsule Take 1 capsule by mouth Daily.      lisinopril (PRINIVIL,ZESTRIL) 2.5 MG tablet Take 1 tablet by mouth Daily. 90 tablet 1    ondansetron ODT (ZOFRAN-ODT) 4 MG disintegrating tablet Take 1 tablet by mouth.      oxyCODONE-acetaminophen (PERCOCET) 7.5-325 MG per tablet Take 1 tablet every 8 hours by oral route as needed for 30 days.      pantoprazole (PROTONIX) 40 MG EC tablet Take 1 tablet by mouth Daily. 90 tablet 1    Semaglutide,0.25 or 0.5MG/DOS, (Ozempic, 0.25 or 0.5 MG/DOSE,) 2 MG/3ML solution pen-injector Inject 0.25 mg under the skin into the appropriate area as directed 1 (One) Time Per Week. 3 mL 1    Tenapanor HCl (Ibsrela) 50 MG tablet Take 1 tablet by mouth 2 (Two) Times a Day. 60 tablet 3    traZODone (DESYREL) 50 MG tablet Take 1 tablet by mouth every night at bedtime. 90 tablet 1    vitamin D3 125 MCG (5000 UT) capsule capsule Take 1 capsule by mouth Daily.      Insulin Pen Needle (Pen Needles) 32G X 4 MM misc Use 1 each 1 (One) Time Per Week. 90 each 1     No current facility-administered medications on file prior to visit.        Allergies   Allergen Reactions    Codeine Rash              Social History     Tobacco Use   Smoking Status Former    Current packs/day: 0.00    Average packs/day: 1 pack/day for 5.0 years (5.0 ttl pk-yrs)    Types: Cigarettes    Start date:     Quit date:     Years since quittin.0   Smokeless Tobacco Never   Tobacco Comments    Less than 5 years          Objective   Vital Signs:   /80 (BP Location: Right arm, Patient Position: Sitting)   Pulse 79   Temp 96.5 °F (35.8 °C) (Temporal)   Ht 170.2 cm (67.01\")   Wt 108 kg (238 lb 4 oz)   SpO2 96%   BMI 37.30 kg/m²     Physical Exam  Vitals reviewed.   Constitutional:       Appearance: Normal appearance.   HENT:      Head: Normocephalic and atraumatic.      Right Ear: A middle ear effusion is present.      Left Ear: A middle ear effusion is present.      Nose: Nose normal. "      Mouth/Throat:      Mouth: Mucous membranes are moist.   Eyes:      Extraocular Movements: Extraocular movements intact.      Conjunctiva/sclera: Conjunctivae normal.      Pupils: Pupils are equal, round, and reactive to light.   Cardiovascular:      Rate and Rhythm: Normal rate and regular rhythm.   Pulmonary:      Effort: Pulmonary effort is normal.      Breath sounds: Normal breath sounds.   Abdominal:      General: Abdomen is flat. Bowel sounds are normal.      Palpations: Abdomen is soft.   Musculoskeletal:         General: Normal range of motion.   Neurological:      General: No focal deficit present.      Mental Status: She is alert and oriented to person, place, and time.   Psychiatric:         Mood and Affect: Mood normal.        Result Review :          Lab Results   Component Value Date    SARSANTIGEN Not Detected 01/16/2025    COVID19 NOT DETECTED 12/17/2020    RAPFLUA Negative 03/29/2024    RAPFLUB Negative 03/29/2024    FLUAAG Not Detected 01/16/2025    FLUBAG Not Detected 01/16/2025    RAPSCRN Negative 11/27/2023    INR 0.96 08/12/2024    HGB 12.5 01/02/2025    BILIRUBINUR Negative 10/25/2024              Assessment and Plan    Diagnoses and all orders for this visit:    1. Acute cough (Primary)  -     XR Chest PA & Lateral (In Office)  -     POCT SARS-CoV-2 + Flu Antigen MAGO    2. Dysfunction of left eustachian tube    3. Viral URI    Other orders  -     montelukast (Singulair) 10 MG tablet; Take 1 tablet by mouth Every Night.  Dispense: 30 tablet; Refill: 0  -     benzonatate (Tessalon Perles) 100 MG capsule; Take 1 capsule by mouth 3 (Three) Times a Day As Needed for Cough for up to 10 days.  Dispense: 30 capsule; Refill: 0    Discussed ddx. No need for abx at this time due to length of symptoms and presentation. Will continue antihistamine and flonase, will add sinulair to help with persistent allergy symptoms. Pt will let us know if sx not resolved in 7-10 days or sooner if sx worsen/change.        Follow Up   Return if symptoms worsen or fail to improve.  Patient was given instructions and counseling regarding her condition or for health maintenance advice. Please see specific information pulled into the AVS if appropriate.

## 2025-01-16 NOTE — TELEPHONE ENCOUNTER
"      Hub staff attempted to follow warm transfer process and was unsuccessful     Caller: Sowmya Hodges \"Alecia\"    Relationship to patient: Self    Best call back number: [phone]     Patient is needing: PATIENT NEEDING A PRESCRIPTION FOR A BATH TUB, PATIENT NEEDING TO SPEAK WITH CLINICAL STAFF.          "

## 2025-01-17 ENCOUNTER — TELEPHONE (OUTPATIENT)
Dept: INTERNAL MEDICINE | Facility: CLINIC | Age: 75
End: 2025-01-17
Payer: MEDICARE

## 2025-01-17 NOTE — TELEPHONE ENCOUNTER
Pt came into office this afternoon wanting to know about the medication she was on and if it would help her ears, I explained to pt per last office visit it appears that the provider thought it was allergy related so put pt on an allergy medication and also gave pt Tessalon perles to help with pt's cough, pt verbalized understanding in office and had no further questions at this time.

## 2025-01-17 NOTE — TELEPHONE ENCOUNTER
"    Caller: Sowmya Hodges \"Alecia\"    Relationship to patient: Self    Best call back number: [phone]    Patient is needing: PATIENT CALLED IN AND IS REQUESTING A CALL BACK WHEN PRESCRIPTION IS SENT TO PHARMACY PLEASE.      "

## 2025-01-17 NOTE — TELEPHONE ENCOUNTER
"  Caller: Sowmya Hodges \"Alecia\"    Relationship: Self    Best call back number: [phone]    What medication are you requesting ANYTHING TO HELP WITH PAIN AND FLUID     What are your current symptoms: RIGHT EAR PAIN, FLUID ON EARS     How long have you been experiencing symptoms: TWO AND THREE DAYS    Have you had these symptoms before:    [x] Yes  [] No    Have you been treated for these symptoms before:   [x] Yes  [] No    If a prescription is needed, what is your preferred pharmacy and phone number: [facility]/PHARMACY #[id number] - [place] - [address] - [phone] [facility] [phone] FX     Additional notes:        "

## 2025-01-20 RX ORDER — BUSPIRONE HYDROCHLORIDE 15 MG/1
15 TABLET ORAL 3 TIMES DAILY PRN
Qty: 270 TABLET | OUTPATIENT
Start: 2025-01-20

## 2025-01-20 RX ORDER — DICYCLOMINE HCL 20 MG
TABLET ORAL
Qty: 360 TABLET | Refills: 1 | Status: SHIPPED | OUTPATIENT
Start: 2025-01-20

## 2025-01-20 RX ORDER — MONTELUKAST SODIUM 10 MG/1
10 TABLET ORAL
Qty: 30 TABLET | Refills: 0 | OUTPATIENT
Start: 2025-01-20

## 2025-01-20 NOTE — TELEPHONE ENCOUNTER
The patient talk to the apartment complex about this?  I am not sure how we are able to assist with this issue.

## 2025-01-20 NOTE — TELEPHONE ENCOUNTER
Spoke with patient rely message, pt verbalized understanding. She stated she will talk with her landlord.

## 2025-01-23 ENCOUNTER — TELEPHONE (OUTPATIENT)
Dept: INTERNAL MEDICINE | Facility: CLINIC | Age: 75
End: 2025-01-23
Payer: MEDICARE

## 2025-01-23 NOTE — TELEPHONE ENCOUNTER
"  Caller: Sowmya Hodges \"Alecia\"    Relationship: Self    Best call back number:     [phone]          What was the call regarding: PATIENT CALLED TO SEE IF ANYTHING HAD BEEN SENT IN.     SHE STATES THAT HER EAR REALLY HURTS.       "

## 2025-01-23 NOTE — TELEPHONE ENCOUNTER
"Caller: Sowmya Hodges \"Alecia\"    Relationship: Self    Best call back number: [phone]     What medication are you requesting: ANTIBIOTIC     What are your current symptoms: FLUID ON EARS, EAR PAIN    How long have you been experiencing symptoms: 3-4 DAYS     Have you had these symptoms before:    [x] Yes  [] No    Have you been treated for these symptoms before:   [x] Yes  [] No    If a prescription is needed, what is your preferred pharmacy and phone number: [facility]/PHARMACY #[id number] - [place], [place] - [address] [facility] [phone] [facility] [phone] FX     Additional notes:CALLER STATED THAT THE MEDICATION PROVIDED FOR THIS AT LAST APPOINTMENT HAS NOT HELPED           "

## 2025-01-23 NOTE — TELEPHONE ENCOUNTER
Called and spoke with pt, explained to pt PCP was not in office to send another medication in, I did offer to send a message to provider and I did offer to schedule pt with a provider for another apt for the ear pain, I did also advise if pt felt the pain was that bad she could go to the urgent care or ER, pt requested to schedule apt with another PCP for Monday.

## 2025-01-27 ENCOUNTER — TELEPHONE (OUTPATIENT)
Dept: INTERNAL MEDICINE | Facility: CLINIC | Age: 75
End: 2025-01-27

## 2025-01-27 ENCOUNTER — OFFICE VISIT (OUTPATIENT)
Dept: INTERNAL MEDICINE | Facility: CLINIC | Age: 75
End: 2025-01-27
Payer: MEDICARE

## 2025-01-27 VITALS
OXYGEN SATURATION: 97 % | TEMPERATURE: 96.5 F | BODY MASS INDEX: 37.67 KG/M2 | DIASTOLIC BLOOD PRESSURE: 70 MMHG | HEART RATE: 70 BPM | WEIGHT: 240 LBS | SYSTOLIC BLOOD PRESSURE: 116 MMHG | HEIGHT: 67 IN

## 2025-01-27 DIAGNOSIS — J01.10 ACUTE NON-RECURRENT FRONTAL SINUSITIS: Primary | ICD-10-CM

## 2025-01-27 PROCEDURE — 99213 OFFICE O/P EST LOW 20 MIN: CPT | Performed by: PHYSICIAN ASSISTANT

## 2025-01-27 PROCEDURE — 3044F HG A1C LEVEL LT 7.0%: CPT | Performed by: PHYSICIAN ASSISTANT

## 2025-01-27 PROCEDURE — 3074F SYST BP LT 130 MM HG: CPT | Performed by: PHYSICIAN ASSISTANT

## 2025-01-27 PROCEDURE — 1126F AMNT PAIN NOTED NONE PRSNT: CPT | Performed by: PHYSICIAN ASSISTANT

## 2025-01-27 PROCEDURE — 3078F DIAST BP <80 MM HG: CPT | Performed by: PHYSICIAN ASSISTANT

## 2025-01-27 NOTE — ASSESSMENT & PLAN NOTE
Right sided, due to duration of symptoms and physical exam findings will treat with Augmentin due to concern of bacterial rhinosinusitis.  Okay to continue conservative treatment as well with Mucinex, Flonase and ibuprofen as needed.  If symptoms persist or worsen patient needs to return.

## 2025-01-27 NOTE — TELEPHONE ENCOUNTER
"  Caller: Sowmya Hodges \"Alecia\"    Relationship to patient: Self    Best call back number: [phone]    Chief complaint: EARACHE     Type of visit: SAME DAY     Requested date: 1.27.25    Additional notes: PATIENT CALLED AT 9.25 WANTING TO RESCHEDULE HER APPT SHE MISSED AT 9.15 THIS MORNING. PATIENT STATES SHE OVER SLEPT AND WOULD LIKE TO COME IN THIS AFTERNOON INSTEAD. HUB UNABLE TO ACCOMMODATE REQUEST.         "

## 2025-01-27 NOTE — PROGRESS NOTES
"Chief Complaint  Earache (Seen for ear pain on 1/16, Singulair to treat it but no help./Pt says her right side of face hurts from ear pain )    Subjective          Sowmya Hodges presents to [facility]    Left ear pain- symptoms present for a couple weeks now.  She has been using singulair, flonase and zyrtec.  The pain is radiating up her scalp. She has noticed some pressure on the right side of her face.  Patient was evaluated in the office about 10 days ago for similar symptoms which have continued.  No fevers.     Objective   Vital Signs:   /70 (BP Location: Right arm, Patient Position: Sitting, Cuff Size: Large Adult)   Pulse 70   Temp 96.5 °F (35.8 °C)   Ht 170.2 cm (67.01\")   Wt 109 kg (240 lb)   SpO2 97%   BMI 37.58 kg/m²     Physical Exam  Vitals reviewed.   Constitutional:       Appearance: Normal appearance. She is well-developed.   HENT:      Head: Normocephalic and atraumatic.      Comments: Midline frontal sinus pressure     Right Ear: Tympanic membrane, ear canal and external ear normal.      Left Ear: Tympanic membrane, ear canal and external ear normal.      Nose: Nose normal. No congestion.      Mouth/Throat:      Mouth: Mucous membranes are moist.      Pharynx: No posterior oropharyngeal erythema.   Eyes:      Conjunctiva/sclera: Conjunctivae normal.      Pupils: Pupils are equal, round, and reactive to light.   Cardiovascular:      Rate and Rhythm: Normal rate and regular rhythm.      Heart sounds: No murmur heard.     No friction rub. No gallop.   Pulmonary:      Effort: Pulmonary effort is normal.      Breath sounds: Normal breath sounds. No wheezing or rhonchi.   Skin:     General: Skin is warm and dry.   Neurological:      Mental Status: She is alert and oriented to person, place, and time.      Cranial Nerves: No cranial nerve deficit.   Psychiatric:         Mood and Affect: Mood and affect normal.         Behavior: Behavior " normal.         Thought Content: Thought content normal.         Judgment: Judgment normal.        Result Review :          Procedures      Assessment and Plan    Diagnoses and all orders for this visit:    1. Acute non-recurrent frontal sinusitis (Primary)  Assessment & Plan:  Right sided, due to duration of symptoms and physical exam findings will treat with Augmentin due to concern of bacterial rhinosinusitis.  Okay to continue conservative treatment as well with Mucinex, Flonase and ibuprofen as needed.  If symptoms persist or worsen patient needs to return.        Other orders  -     amoxicillin-clavulanate (AUGMENTIN) 875-125 MG per tablet; Take 1 tablet by mouth 2 (Two) Times a Day for 7 days.  Dispense: 14 tablet; Refill: 0              Follow Up   Return if symptoms worsen or fail to improve.  Patient was given instructions and counseling regarding her condition or for health maintenance advice. Please see specific information pulled into the AVS if appropriate.

## 2025-01-27 NOTE — PROGRESS NOTES
I have reviewed the notes, assessments, and/or procedures performed by Kristie Padilla PA-C, I concur with her documentation of Sowmya Hodges.

## 2025-01-29 ENCOUNTER — PRIOR AUTHORIZATION (OUTPATIENT)
Dept: INTERNAL MEDICINE | Facility: CLINIC | Age: 75
End: 2025-01-29
Payer: MEDICARE

## 2025-01-29 NOTE — TELEPHONE ENCOUNTER
Ozempic (0.25 or 0.5 MG/DOSE) 2MG/3ML pen-injectors    Status: Approved, Coverage Starts on: 1/1/2025 12:00:00 AM, Coverage Ends on: 12/31/2025

## 2025-02-10 ENCOUNTER — TELEPHONE (OUTPATIENT)
Dept: GASTROENTEROLOGY | Facility: CLINIC | Age: 75
End: 2025-02-10
Payer: MEDICARE

## 2025-02-10 NOTE — TELEPHONE ENCOUNTER
LVM for pt to turn our call so we can help her get rescedule for an braxton. Pt is schedule with Chloe VENTURA  on April 3, 2025. Ms. Euceda LORENZO  in not going to be in the office that day and we need to reschedule her.

## 2025-02-24 RX ORDER — HYDROXYZINE PAMOATE 25 MG/1
25 CAPSULE ORAL 3 TIMES DAILY PRN
Qty: 90 CAPSULE | Refills: 0 | Status: SHIPPED | OUTPATIENT
Start: 2025-02-24

## 2025-02-24 RX ORDER — SEMAGLUTIDE 0.68 MG/ML
0.25 INJECTION, SOLUTION SUBCUTANEOUS WEEKLY
Qty: 3 ML | Refills: 1 | Status: SHIPPED | OUTPATIENT
Start: 2025-02-24

## 2025-03-05 RX ORDER — BUSPIRONE HYDROCHLORIDE 5 MG/1
5 TABLET ORAL 3 TIMES DAILY
Qty: 90 TABLET | Refills: 1 | Status: SHIPPED | OUTPATIENT
Start: 2025-03-05

## 2025-03-19 RX ORDER — LEVETIRACETAM 750 MG/1
750 TABLET ORAL 2 TIMES DAILY
Qty: 180 TABLET | Refills: 1 | Status: SHIPPED | OUTPATIENT
Start: 2025-03-19

## 2025-03-20 ENCOUNTER — PRIOR AUTHORIZATION (OUTPATIENT)
Dept: INTERNAL MEDICINE | Facility: CLINIC | Age: 75
End: 2025-03-20
Payer: MEDICARE

## 2025-03-20 NOTE — TELEPHONE ENCOUNTER
Ozempic (0.25 or 0.5 MG/DOSE) 2MG/3ML pen-injectors    This approval authorizes your coverage from 03/01/2025 - 12/31/2025

## 2025-04-23 RX ORDER — ATORVASTATIN CALCIUM 20 MG/1
20 TABLET, FILM COATED ORAL DAILY
Qty: 90 TABLET | Refills: 1 | Status: SHIPPED | OUTPATIENT
Start: 2025-04-23

## 2025-04-23 RX ORDER — ESCITALOPRAM OXALATE 20 MG/1
20 TABLET ORAL DAILY
Qty: 90 TABLET | Refills: 1 | Status: SHIPPED | OUTPATIENT
Start: 2025-04-23

## 2025-05-08 ENCOUNTER — OFFICE VISIT (OUTPATIENT)
Dept: INTERNAL MEDICINE | Facility: CLINIC | Age: 75
End: 2025-05-08
Payer: MEDICARE

## 2025-05-08 VITALS
TEMPERATURE: 96.3 F | HEIGHT: 67 IN | WEIGHT: 244.8 LBS | OXYGEN SATURATION: 98 % | SYSTOLIC BLOOD PRESSURE: 106 MMHG | RESPIRATION RATE: 16 BRPM | HEART RATE: 74 BPM | DIASTOLIC BLOOD PRESSURE: 56 MMHG | BODY MASS INDEX: 38.42 KG/M2

## 2025-05-08 DIAGNOSIS — R10.9 RIGHT FLANK PAIN: ICD-10-CM

## 2025-05-08 DIAGNOSIS — R31.9 HEMATURIA, UNSPECIFIED TYPE: ICD-10-CM

## 2025-05-08 DIAGNOSIS — N89.8 VAGINAL ITCHING: ICD-10-CM

## 2025-05-08 DIAGNOSIS — M25.571 ACUTE RIGHT ANKLE PAIN: Primary | ICD-10-CM

## 2025-05-08 LAB
BACTERIA UR QL AUTO: ABNORMAL /HPF
BILIRUB UR QL STRIP: NEGATIVE
CANDIDA SPECIES: POSITIVE
CLARITY UR: ABNORMAL
COLOR UR: YELLOW
GARDNERELLA VAGINALIS: NEGATIVE
GLUCOSE UR STRIP-MCNC: ABNORMAL MG/DL
HGB UR QL STRIP.AUTO: ABNORMAL
HYALINE CASTS UR QL AUTO: ABNORMAL /LPF
KETONES UR QL STRIP: NEGATIVE
LEUKOCYTE ESTERASE UR QL STRIP.AUTO: NEGATIVE
NITRITE UR QL STRIP: NEGATIVE
PH UR STRIP.AUTO: 5.5 [PH] (ref 5–8)
PROT UR QL STRIP: NEGATIVE
RBC # UR STRIP: ABNORMAL /HPF
REF LAB TEST METHOD: ABNORMAL
SP GR UR STRIP: 1.01 (ref 1–1.03)
SQUAMOUS #/AREA URNS HPF: ABNORMAL /HPF
T VAGINALIS DNA VAG QL PROBE+SIG AMP: NEGATIVE
UROBILINOGEN UR QL STRIP: ABNORMAL
WBC # UR STRIP: ABNORMAL /HPF

## 2025-05-08 PROCEDURE — 1159F MED LIST DOCD IN RCRD: CPT | Performed by: NURSE PRACTITIONER

## 2025-05-08 PROCEDURE — 87510 GARDNER VAG DNA DIR PROBE: CPT | Performed by: NURSE PRACTITIONER

## 2025-05-08 PROCEDURE — 3078F DIAST BP <80 MM HG: CPT | Performed by: NURSE PRACTITIONER

## 2025-05-08 PROCEDURE — 87086 URINE CULTURE/COLONY COUNT: CPT | Performed by: NURSE PRACTITIONER

## 2025-05-08 PROCEDURE — 99214 OFFICE O/P EST MOD 30 MIN: CPT | Performed by: NURSE PRACTITIONER

## 2025-05-08 PROCEDURE — 81001 URINALYSIS AUTO W/SCOPE: CPT | Performed by: NURSE PRACTITIONER

## 2025-05-08 PROCEDURE — 1126F AMNT PAIN NOTED NONE PRSNT: CPT | Performed by: NURSE PRACTITIONER

## 2025-05-08 PROCEDURE — 3044F HG A1C LEVEL LT 7.0%: CPT | Performed by: NURSE PRACTITIONER

## 2025-05-08 PROCEDURE — 87660 TRICHOMONAS VAGIN DIR PROBE: CPT | Performed by: NURSE PRACTITIONER

## 2025-05-08 PROCEDURE — 87480 CANDIDA DNA DIR PROBE: CPT | Performed by: NURSE PRACTITIONER

## 2025-05-08 PROCEDURE — 1160F RVW MEDS BY RX/DR IN RCRD: CPT | Performed by: NURSE PRACTITIONER

## 2025-05-08 PROCEDURE — 3074F SYST BP LT 130 MM HG: CPT | Performed by: NURSE PRACTITIONER

## 2025-05-08 RX ORDER — NAPROXEN 500 MG/1
500 TABLET ORAL 2 TIMES DAILY WITH MEALS
Qty: 60 TABLET | Refills: 0 | Status: SHIPPED | OUTPATIENT
Start: 2025-05-08

## 2025-05-08 NOTE — PROGRESS NOTES
"Chief Complaint  Ankle Pain (Having trouble with right ankle, about a week ago woke up with it swollen, do not remember injuring it. Having trouble with weight bearing. ) and Back Pain (Lower right side back pain. No urinary frequency, no discoloration of urine. Does itch in vaginal area. )    Subjective      Sowmya Hodges is a 74 y.o. female who presents to [facility]     Patient reports vaginal itching x 3-4 days. Denies dysuria, hematuria, vaginal discharge, change in urine color or odor. Does have right sided low back pain. Has low back pain frequently, but this is different than her normal. Denies nausea, vomiting. Denies history of kidney stones. Patient does have a history of renal cancer, she is scheduled for CT tomorrow through Urology.     Patient states she woke up with right ankle swelling two weeks ago. Swelling persists, ankle will tingle at times. Pain is exacerbated by walking, no pain with sitting. Denies redness. No direct injury or increase in physical activity. Right ankle only. Had an xray that showed soft tissue swelling but otherwise no acute findings. Patient was treated with a steroid by Urgent Care with minimal improvement. Patient has an appointment with podiatry this month. She has been taking Tylenol in addition to her Percocet from Pain Management.     Objective   Vital Signs:   Vitals:    05/08/25 0826   BP: 106/56   BP Location: Left arm   Patient Position: Sitting   Cuff Size: Large Adult   Pulse: 74   Resp: 16   Temp: 96.3 °F (35.7 °C)   TempSrc: Temporal   SpO2: 98%   Weight: 111 kg (244 lb 12.8 oz)   Height: 170.2 cm (67\")     Body mass index is 38.34 kg/m².    Wt Readings from Last 3 Encounters:   05/08/25 111 kg (244 lb 12.8 oz)   01/27/25 109 kg (240 lb)   01/16/25 108 kg (238 lb 4 oz)     BP Readings from Last 3 Encounters:   05/08/25 106/56   04/22/25 137/64   01/27/25 116/70       Health Maintenance   Topic Date Due    " TDAP/TD VACCINES (1 - Tdap) Never done    URINE MICROALBUMIN-CREATININE RATIO (uACR)  10/06/2021    ANNUAL WELLNESS VISIT  03/06/2025    COVID-19 Vaccine (6 - 2024-25 season) 04/02/2025    HEMOGLOBIN A1C  07/02/2025    INFLUENZA VACCINE  07/01/2025    DIABETIC FOOT EXAM  08/08/2025    DIABETIC EYE EXAM  08/19/2025    LIPID PANEL  01/02/2026    DXA SCAN  04/25/2026    MAMMOGRAM  10/18/2026    COLORECTAL CANCER SCREENING  01/17/2028    HEPATITIS C SCREENING  Completed    Hepatitis B  Completed    Pneumococcal Vaccine 50+  Completed    ZOSTER VACCINE  Completed       Physical Exam  Constitutional:       Appearance: Normal appearance.   HENT:      Head: Normocephalic and atraumatic.      Nose: Nose normal.      Mouth/Throat:      Mouth: Mucous membranes are moist.      Pharynx: Oropharynx is clear.   Eyes:      Extraocular Movements: Extraocular movements intact.      Conjunctiva/sclera: Conjunctivae normal.      Pupils: Pupils are equal, round, and reactive to light.   Cardiovascular:      Rate and Rhythm: Normal rate and regular rhythm.      Heart sounds: Normal heart sounds.   Pulmonary:      Effort: Pulmonary effort is normal.      Breath sounds: Normal breath sounds.   Abdominal:      Tenderness: There is no right CVA tenderness or left CVA tenderness.   Musculoskeletal:        Feet:    Skin:     General: Skin is warm and dry.   Neurological:      General: No focal deficit present.      Mental Status: She is alert and oriented to person, place, and time.   Psychiatric:         Mood and Affect: Mood normal.         Behavior: Behavior normal.         Thought Content: Thought content normal.          Result Review :  The following data was reviewed by: LORENZO Escobar on 05/08/2025:         Procedures          Assessment & Plan  Acute right ankle pain  Discussed options for treatment and evaluation including repeat xray, referral to PT and/or MRI. Patient declines. She would like to trial an NSAID and follow up  with podiatry as scheduled to discuss potential for injection. Discussed short course of NSAID, recent renal function within normal range. She will monitor closely and notify clinic with concerns.        Right flank pain  Patient with history of renal cancer, also with trace hematuria on UA today. Will send urine for micro and culture. Discussed with patient potential for nephrolithiasis, discussed option to order STAT CT for today. Patient declines, would prefer to continue with CT as scheduled for urology tomorrow. Encouraged patient to proceed directly to the ER with severe/persistent pain, nausea, vomiting, fever, gross hematuria. Patient voices understanding and is agreeable to plan, she will notify clinic with concerns.  Orders:    Urinalysis With Culture If Indicated - Urine, Clean Catch    Urinalysis, Microscopic Only - Urine, Clean Catch    Urine Culture - Urine, Urine, Clean Catch    Vaginal itching  Vaginal screen today.   Orders:    Gardnerella vaginalis, Trichomonas vaginalis, Candida albicans, DNA - Swab, Vagina; Future    Hematuria, unspecified type  Urine for micro and culture as discussed.            FOLLOW UP  Return if symptoms worsen or fail to improve.  Patient was given instructions and counseling regarding her condition or for health maintenance advice. Please see specific information pulled into the AVS if appropriate.       Katherine French, LORENZO  05/08/25  10:24 EDT    CURRENT & DISCONTINUED MEDICATIONS  Current Outpatient Medications   Medication Instructions    atorvastatin (LIPITOR) 20 mg, Oral, Daily    dapagliflozin Propanediol (FARXIGA) 10 mg, Oral, Daily    dicyclomine (BENTYL) 20 MG tablet TAKE 1 TABLET BY MOUTH BEFORE MEALS AND AT BEDTIMES AS NEEDED FOR CRAMPING    docusate sodium (COLACE) 100 mg, 2 Times Daily    escitalopram (LEXAPRO) 20 mg, Oral, Daily    fluticasone (FLONASE) 50 MCG/ACT nasal spray 2 sprays, Nasal, Daily    hydrOXYzine pamoate (VISTARIL) 25 mg, Oral, 3 Times Daily  PRN    Insulin Pen Needle (Pen Needles) 32G X 4 MM misc 1 each, Not Applicable, Weekly    levETIRAcetam (KEPPRA) 750 mg, Oral, 2 Times Daily    levocetirizine (XYZAL) 5 mg, Oral, Every Evening    lidocaine (LIDODERM) 5 % 1 patch, Daily    lisinopril (PRINIVIL,ZESTRIL) 2.5 mg, Oral, Daily    montelukast (SINGULAIR) 10 mg, Oral, Nightly    naproxen (NAPROSYN) 500 mg, Oral, 2 Times Daily With Meals    ondansetron ODT (ZOFRAN-ODT) 4 mg    oxyCODONE-acetaminophen (PERCOCET) 7.5-325 MG per tablet Take 1 tablet every 8 hours by oral route as needed for 30 days.    Ozempic (0.25 or 0.5 MG/DOSE) 0.25 mg, Subcutaneous, Weekly    pantoprazole (PROTONIX) 40 mg, Oral, Daily    traZODone (DESYREL) 50 mg, Oral, Every Night at Bedtime    triamcinolone (KENALOG) 0.1 % cream APPLY THIN LAYER ON TOP OF AFFECTED AREAS UP TO 4 TIMES PER DAY.    vitamin D3 5,000 Units, Daily       Medications Discontinued During This Encounter   Medication Reason    triamcinolone (KENALOG) 0.1 % paste

## 2025-05-09 LAB — BACTERIA SPEC AEROBE CULT: NORMAL

## 2025-05-21 ENCOUNTER — TELEPHONE (OUTPATIENT)
Dept: GASTROENTEROLOGY | Facility: CLINIC | Age: 75
End: 2025-05-21
Payer: MEDICARE

## 2025-05-21 NOTE — TELEPHONE ENCOUNTER
Attempted to contact/Contacted Sowmya Hodges 1950 regarding the appointment no show with LORENZO Powell on 05.20.25 @ 9:00. Patient is aware that there is a 24-hour cancellation policy and understands that a no-show letter will be mailed to them at the address on file. [facility]

## 2025-06-04 RX ORDER — NAPROXEN 500 MG/1
500 TABLET ORAL 2 TIMES DAILY WITH MEALS
Qty: 60 TABLET | Refills: 0 | Status: SHIPPED | OUTPATIENT
Start: 2025-06-04

## 2025-06-07 ENCOUNTER — HOSPITAL ENCOUNTER (EMERGENCY)
Facility: HOSPITAL | Age: 75
Discharge: HOME | End: 2025-06-07
Attending: EMERGENCY MEDICINE
Payer: MEDICARE

## 2025-06-07 ENCOUNTER — APPOINTMENT (OUTPATIENT)
Dept: GENERAL RADIOLOGY | Facility: HOSPITAL | Age: 75
End: 2025-06-07
Payer: MEDICARE

## 2025-06-07 VITALS
SYSTOLIC BLOOD PRESSURE: 131 MMHG | DIASTOLIC BLOOD PRESSURE: 73 MMHG | WEIGHT: 244.71 LBS | OXYGEN SATURATION: 95 % | TEMPERATURE: 97.8 F | BODY MASS INDEX: 38.41 KG/M2 | RESPIRATION RATE: 20 BRPM | HEART RATE: 77 BPM | HEIGHT: 67 IN

## 2025-06-07 DIAGNOSIS — M25.471 RIGHT ANKLE SWELLING: Primary | ICD-10-CM

## 2025-06-07 PROCEDURE — 99283 EMERGENCY DEPT VISIT LOW MDM: CPT

## 2025-06-07 PROCEDURE — 73610 X-RAY EXAM OF ANKLE: CPT

## 2025-06-07 NOTE — DISCHARGE INSTRUCTIONS
Your x-ray today does show a mild amount of swelling and arthritis changes along with bone spurs but these are not new findings, they are similar to your previous x-ray.  Please try to keep the Ace bandage on to apply compression to the ankle to help with swelling.  Try to keep the ankle elevated is much as possible to help with swelling.  He may apply ice to the area 15 to 20 minutes at a time 4-5 times per day.  Take Tylenol and ibuprofen for pain control.  Continue follow-up with podiatry as you have scheduled on Monday.

## 2025-06-07 NOTE — ED PROVIDER NOTES
Time: 10:22 AM EDT  Date of encounter:  6/7/2025  Independent Historian/Clinical History and Information was obtained by:   Patient    History is limited by: N/A    Chief Complaint: Ankle swelling      History of Present Illness:  Patient is a 74 y.o. year old female who presents to the emergency department for evaluation of ankle swelling. Patient stated that for the past two days she has noticed swelling of her right ankle. She states that she is able to bear weight but it is painful when ambulating. She has full range of motion. Denies recent trauma, history of DVT/PE or HF. She denies radiation of pain.      Patient Care Team  Primary Care Provider: Tamara Singer PA-C    Past Medical History:     Allergies   Allergen Reactions    Codeine Rash            Past Medical History:   Diagnosis Date    Anemia     Anxiety     Arthritis     Bronchitis     Constipation     Depression     Diabetes     Diverticulitis     Dyspnea 04/03/2024    Epilepsy     Fracture of distal fibula 09/26/2017    right    GERD (gastroesophageal reflux disease)     Heart disease     HTN (hypertension)     Hyperlipidemia     Limb swelling     Lower abdominal pain     Nausea and vomiting in adult 06/03/2022    TAYLOR (obstructive sleep apnea)     Pneumonia     Polyp, sinus maxillary     Renal cell adenocarcinoma, right 12/10/2014    Seasonal allergies     Shortness of breath      Past Surgical History:   Procedure Laterality Date    CARDIAC SURGERY      open heart at age 4     CARDIAC VALVE SURGERY      CHOLECYSTECTOMY      COLONOSCOPY  04/12/2017    dr monique    COLONOSCOPY N/A 1/17/2023    Procedure: COLONOSCOPY WITH BIOPSIES, POLYPECTOMY;  Surgeon: Sean Monique MD;  Location: [facility] ENDOSCOPY;  Service: Gastroenterology;  Laterality: N/A;  COLON POLYP    CYSTOSCOPY, URETEROSCOPY, RETROGRADE PYELOGRAM, STENT INSERTION Left 9/19/2024    Procedure: CYSTOSCOPY URETEROSCOPY RETROGRADE PYELOGRAM STENT INSERTION;  Surgeon: Modesta Butts,  MD;  Location: [facility] MAIN OR;  Service: Urology;  Laterality: Left;    ENDOSCOPY  04/12/2017    dr monique    ENDOSCOPY N/A 6/9/2022    Procedure: ESOPHAGOGASTRODUODENOSCOPY with biopsy;  Surgeon: Sean Monique MD;  Location: [facility] ENDOSCOPY;  Service: Gastroenterology;  Laterality: N/A;  gastritis    ENDOSCOPY N/A 9/27/2024    Procedure: ESOPHAGOGASTRODUODENOSCOPY with biopsy;  Surgeon: Sean Monique MD;  Location: [facility] ENDOSCOPY;  Service: Gastroenterology;  Laterality: N/A;  retained food, hiatal hernia, gastric polyp    EYE SURGERY      implant    NEPHRECTOMY PARTIAL Right 12/02/2014    robotic right partial nephrectomy w dr garvey    OTHER SURGICAL HISTORY      joint surgery    SALIVARY GLAND SURGERY      TONSILLECTOMY      TOTAL KNEE ARTHROPLASTY Right     UPPER GASTROINTESTINAL ENDOSCOPY  12/22/2020    Dr. Monique    WRIST SURGERY Left      Family History   Problem Relation Age of Onset    Arthritis Mother     Arthritis Father     Cancer Father     Arthritis Sister     Arthritis Brother     Colon cancer Neg Hx     Malig Hyperthermia Neg Hx        Home Medications:  Prior to Admission medications    Medication Sig Start Date End Date Taking? Authorizing Provider   atorvastatin (LIPITOR) 20 MG tablet TAKE 1 TABLET BY MOUTH EVERY DAY 4/23/25   Tamara Singer PA-C   dapagliflozin Propanediol (Farxiga) 10 MG tablet Take 10 mg by mouth Daily. 1/2/25   Tamara Singer PA-C   dicyclomine (BENTYL) 20 MG tablet TAKE 1 TABLET BY MOUTH BEFORE MEALS AND AT BEDTIMES AS NEEDED FOR CRAMPING 1/20/25   Chloe Euceda APRN   docusate sodium (COLACE) 100 MG capsule Take 1 capsule by mouth 2 (Two) Times a Day.    Provider, MD Adeola   escitalopram (LEXAPRO) 20 MG tablet TAKE 1 TABLET BY MOUTH EVERY DAY 4/23/25   Tamara Singer PA-C   fluticasone (FLONASE) 50 MCG/ACT nasal spray Administer 2 sprays into the nostril(s) as directed by provider Daily. 1/2/25   Tamara Singer PA-C    hydrOXYzine pamoate (VISTARIL) 25 MG capsule TAKE 1 CAPSULE BY MOUTH 3 (THREE) TIMES A DAY AS NEEDED FOR ANXIETY. 2/24/25   Tamara Singer PA-C   Insulin Pen Needle (Pen Needles) 32G X 4 MM misc Use 1 each 1 (One) Time Per Week. 1/2/25   Tamara Singer PA-C   levETIRAcetam (KEPPRA) 750 MG tablet TAKE 1 TABLET BY MOUTH TWICE A DAY 3/19/25   Romi Rubio MD   levocetirizine (XYZAL) 5 MG tablet Take 1 tablet by mouth Every Evening. 1/2/25   Tamara Singer PA-C   lidocaine (LIDODERM) 5 % Place 1 patch on the skin as directed by provider Daily.    Adeola Burch MD   lisinopril (PRINIVIL,ZESTRIL) 2.5 MG tablet Take 1 tablet by mouth Daily. 1/2/25   Tamara Singer PA-C   montelukast (Singulair) 10 MG tablet Take 1 tablet by mouth Every Night. 1/16/25   Tamara Singer PA-C   naproxen (NAPROSYN) 500 MG tablet TAKE 1 TABLET BY MOUTH TWICE A DAY WITH MEALS 6/4/25   Tamara Signer PA-C   ondansetron ODT (ZOFRAN-ODT) 4 MG disintegrating tablet Take 1 tablet by mouth.    Adeola Burch MD   oxyCODONE-acetaminophen (PERCOCET) 7.5-325 MG per tablet Take 1 tablet every 8 hours by oral route as needed for 30 days. 8/24/23   Adeola Burch MD   pantoprazole (PROTONIX) 40 MG EC tablet Take 1 tablet by mouth Daily. 1/2/25   Tamara Singer PA-C   Semaglutide,0.25 or 0.5MG/DOS, (Ozempic, 0.25 or 0.5 MG/DOSE,) 2 MG/3ML solution pen-injector INJECT 0.25 MG UNDER THE SKIN INTO THE APPROPRIATE AREA AS DIRECTED 1 (ONE) TIME PER WEEK. 2/24/25   Tamara Singer PA-C   traZODone (DESYREL) 50 MG tablet Take 1 tablet by mouth every night at bedtime. 1/2/25   Tamara Singer PA-C   triamcinolone (KENALOG) 0.1 % cream APPLY THIN LAYER ON TOP OF AFFECTED AREAS UP TO 4 TIMES PER DAY. 2/10/25   Provider, MD Adeola   vitamin D3 125 MCG (5000 UT) capsule capsule Take 1 capsule by mouth Daily.    Provider, Adeola, MD        Social History:   Social History     Tobacco Use    Smoking  "status: Former     Current packs/day: 0.00     Average packs/day: 1 pack/day for 5.0 years (5.0 ttl pk-yrs)     Types: Cigarettes     Start date:      Quit date:      Years since quittin.4    Smokeless tobacco: Never    Tobacco comments:     Less than 5 years   Vaping Use    Vaping status: Never Used   Substance Use Topics    Alcohol use: Never     Comment: drinks less than 1 drink per day     Drug use: Never         Review of Systems:  Review of Systems   Respiratory:  Negative for shortness of breath.    Cardiovascular:  Negative for chest pain and leg swelling.   Gastrointestinal:  Negative for nausea and vomiting.   Musculoskeletal:  Positive for arthralgias, gait problem, joint swelling and myalgias.   All other systems reviewed and are negative.       Physical Exam:  /73 (BP Location: Right arm, Patient Position: Sitting)   Pulse 77   Temp 97.8 °F (36.6 °C) (Oral)   Resp 20   Ht 170.2 cm (67\")   Wt 111 kg (244 lb 11.4 oz)   SpO2 95%   BMI 38.33 kg/m²     Physical Exam  Vitals and nursing note reviewed.   Constitutional:       General: She is not in acute distress.     Appearance: Normal appearance. She is not ill-appearing, toxic-appearing or diaphoretic.   HENT:      Head: Normocephalic and atraumatic.      Nose: Nose normal.      Mouth/Throat:      Mouth: Mucous membranes are moist.   Eyes:      Extraocular Movements: Extraocular movements intact.      Conjunctiva/sclera: Conjunctivae normal.      Pupils: Pupils are equal, round, and reactive to light.   Cardiovascular:      Rate and Rhythm: Normal rate and regular rhythm.      Pulses: Normal pulses.      Heart sounds: Normal heart sounds. No murmur heard.     No friction rub. No gallop.   Pulmonary:      Effort: Pulmonary effort is normal.      Breath sounds: Normal breath sounds.   Abdominal:      General: Abdomen is flat. There is no distension.   Musculoskeletal:         General: Swelling present. No tenderness, deformity or " signs of injury. Normal range of motion.      Cervical back: Normal range of motion and neck supple.      Right lower leg: Normal. No edema.      Left lower leg: Normal. No edema.      Right ankle: Swelling present. No deformity, ecchymosis or lacerations. No tenderness. Normal range of motion. Anterior drawer test negative. Normal pulse.      Left ankle: Normal.      Comments: Likely palpable cyst along the lower portion of the right lateral malleolus   Skin:     General: Skin is warm and dry.      Capillary Refill: Capillary refill takes less than 2 seconds.   Neurological:      General: No focal deficit present.      Mental Status: She is alert and oriented to person, place, and time.   Psychiatric:         Mood and Affect: Mood normal.         Behavior: Behavior normal.         Thought Content: Thought content normal.         Judgment: Judgment normal.                    Medical Decision Making:    Comorbidities that affect care:    GERD, diabetes mellitus, hyperlipidemia, CAD, renal cell adenocarcinoma    External Notes reviewed:    Previous Clinic Note: Primary care office visit from 5 - 8 - 25 for ankle pain and swelling and Previous Radiological Studies: X-ray completed of right ankle on 4-      The following orders were placed and all results were independently analyzed by me:  Orders Placed This Encounter   Procedures    XR Ankle 3+ View Right    Ace bandage R ankle  Misc Nursing Order (Specify)       Medications Given in the Emergency Department:  Medications - No data to display     ED Course:         Labs:    Lab Results (last 24 hours)       ** No results found for the last 24 hours. **             Imaging:    XR Ankle 3+ View Right  Result Date: 6/7/2025  XR ANKLE 3+ VW RIGHT Date of Exam: 6/7/2025 10:34 AM EDT Indication: R ankle pain pain Comparison: Right ankle x-rays 4/22/2025 Findings: No ankle joint effusion. The ankle mortise joint space appears symmetric and preserved. No acute fracture  or traumatic malalignment. Mild osteoarthritic changes of the ankle. Mild degenerative changes throughout the midfoot. Small posterior and plantar calcaneal enthesophytes are redemonstrated. Questionable soft tissue swelling or soft tissue fullness at the lower lateral ankle. Similar small chronic osseous osseous excrescence at the medial malleolus likely reflecting sequelae of remote injury.     Impression: No acute fracture or malalignment. Mild osteoarthritic changes. Electronically Signed: Pancho Roe MD  6/7/2025 10:41 AM EDT  Workstation ID: [id number]        Differential Diagnosis and Discussion:    Joint Pain: Differential diagnosis includes but is not limited to polyarticular arthritis, gout, tendinitis, hemarthrosis, septic arthritis, rheumatoid arthritis, bursitis, degenerative joint disease, joint effusion, autoimmune disorder, trauma, and occult neoplasm.    PROCEDURES:    X-ray were performed in the emergency department and all X-ray impressions were independently interpreted by me.    No orders to display       Procedures    MDM  Number of Diagnoses or Management Options  Diagnosis management comments: Patient presented emergency department today for evaluation of right ankle swelling for the past 2 days.  She has been seen for similar complaint twice over the last month and has appointment with podiatry 2 days from now on 6 - 9 - 25.  Repeat x-ray was obtained today.  There is chronic changes of arthritis and bone spurs and mild swelling noted.  There is mild swelling noted on physical exam.  There is no overlying erythema or warmth to the leg concerning for any infection causing her symptoms.  Patient was placed in Ace bandage and instructed to take Tylenol and ibuprofen for pain control.  Try to keep the ankle elevated as well to help with swelling.  Patient will follow-up with podiatry on Monday as she already has scheduled.       Amount and/or Complexity of Data Reviewed  Tests in the radiology  section of CPT®: reviewed and ordered    Risk of Complications, Morbidity, and/or Mortality  Presenting problems: moderate  Diagnostic procedures: low  Management options: low    Patient Progress  Patient progress: stable       Patient Care Considerations:    NARCOTICS: I considered prescribing opiate pain medication as an outpatient, however patient is on chronic pain medication outpatient by pain management      Consultants/Shared Management Plan:    None    Social Determinants of Health:    Patient is independent, reliable, and has access to care.       Disposition and Care Coordination:    Discharged: The patient is suitable and stable for discharge with no need for consideration of admission.    I have explained the patient´s condition, diagnoses and treatment plan based on the information available to me at this time. I have answered questions and addressed any concerns. The patient has a good  understanding of the patient´s diagnosis, condition, and treatment plan as can be expected at this point. The vital signs have been stable. The patient´s condition is stable and appropriate for discharge from the emergency department.      The patient will pursue further outpatient evaluation with the primary care physician or other designated or consulting physician as outlined in the discharge instructions. They are agreeable to this plan of care and follow-up instructions have been explained in detail. The patient has received these instructions in written format and has expressed an understanding of the discharge instructions. The patient is aware that any significant change in condition or worsening of symptoms should prompt an immediate return to this or the closest emergency department or call to 911.  I have explained discharge medications and the need for follow up with the patient/caretakers. This was also printed in the discharge instructions. Patient was discharged with the following medications and follow  up:      Medication List      No changes were made to your prescriptions during this visit.      Tamara Singer PA-C  [address] [id number]  [phone]             Final diagnoses:   Right ankle swelling        ED Disposition       ED Disposition   Discharge    Condition   Stable    Comment   --               This medical record created using voice recognition software.             Lamonte Hogan PA-C  06/07/25 1049

## 2025-07-18 RX ORDER — FLUTICASONE PROPIONATE 50 MCG
2 SPRAY, SUSPENSION (ML) NASAL DAILY
Qty: 16 G | Refills: 2 | Status: SHIPPED | OUTPATIENT
Start: 2025-07-18

## 2025-08-18 RX ORDER — TRAZODONE HYDROCHLORIDE 50 MG/1
50 TABLET ORAL
Qty: 90 TABLET | Refills: 1 | Status: SHIPPED | OUTPATIENT
Start: 2025-08-18

## 2025-08-18 RX ORDER — SEMAGLUTIDE 0.68 MG/ML
0.25 INJECTION, SOLUTION SUBCUTANEOUS WEEKLY
Qty: 3 ML | Refills: 0 | Status: SHIPPED | OUTPATIENT
Start: 2025-08-18

## (undated) DEVICE — SOL IRRG H2O PL/BG 1000ML STRL

## (undated) DEVICE — BLCK/BITE BLOX WO/DENTL/RIM W/STRAP 54F

## (undated) DEVICE — LINER SURG CANSTR SXN S/RIGD 1500CC

## (undated) DEVICE — TOWEL,OR,DSP,ST,BLUE,STD,4/PK,20PK/CS: Brand: MEDLINE

## (undated) DEVICE — CONTAINER,SPEC,PNEUM TUBE,4OZ,STRL PATH: Brand: MEDLINE

## (undated) DEVICE — EGD OR ERCP KIT: Brand: MEDLINE INDUSTRIES, INC.

## (undated) DEVICE — GW PTFE/COAT STR/TP STFF/BDY .038 150CM STRL EA/5

## (undated) DEVICE — Device: Brand: DEFENDO AIR/WATER/SUCTION AND BIOPSY VALVE

## (undated) DEVICE — SOL IRRG H2O BG 3000ML STRL

## (undated) DEVICE — CONN JET HYDRA H20 AUXILIARY DISP

## (undated) DEVICE — SOLIDIFIER LIQLOC PLS 1500CC BT

## (undated) DEVICE — THE SINGLE USE ETRAP – POLYP TRAP IS USED FOR SUCTION RETRIEVAL OF ENDOSCOPICALLY REMOVED POLYPS.: Brand: ETRAP

## (undated) DEVICE — GLOVE,SURG,SENSICARE SLT,LF,PF,6.5: Brand: MEDLINE

## (undated) DEVICE — GOWN,REINFORCE,POLY,SIRUS,BREATH SLV,XLG: Brand: MEDLINE

## (undated) DEVICE — FIAPC® PROBE W/ FILTER 2200 A OD 2.3MM/6.9FR; L 2.2M/7.2FT: Brand: ERBE

## (undated) DEVICE — CYSTO PACK: Brand: MEDLINE INDUSTRIES, INC.

## (undated) DEVICE — SINGLE-USE BIOPSY FORCEPS: Brand: RADIAL JAW 4

## (undated) DEVICE — MEDICINE CUP, GRADUATED, STER: Brand: MEDLINE

## (undated) DEVICE — Device

## (undated) DEVICE — CYSTO/BLADDER IRRIGATION SET, REGULATING CLAMP

## (undated) DEVICE — SNAR E/S POLYP SNAREMASTER OVL/10MM 2.8X2300MM YEL

## (undated) DEVICE — GW ZIPWIRE STD/SHFT STR TPR/3CM .038IN 150CM

## (undated) DEVICE — SKIN PREP TRAY W/CHG: Brand: MEDLINE INDUSTRIES, INC.

## (undated) DEVICE — CATH URETRL PA CONE TP 8F